# Patient Record
Sex: MALE | Race: WHITE | NOT HISPANIC OR LATINO | Employment: OTHER | URBAN - METROPOLITAN AREA
[De-identification: names, ages, dates, MRNs, and addresses within clinical notes are randomized per-mention and may not be internally consistent; named-entity substitution may affect disease eponyms.]

---

## 2017-02-20 ENCOUNTER — RECORDS - HEALTHEAST (OUTPATIENT)
Dept: LAB | Facility: CLINIC | Age: 67
End: 2017-02-20

## 2017-02-20 ENCOUNTER — RECORDS - HEALTHEAST (OUTPATIENT)
Dept: ADMINISTRATIVE | Facility: OTHER | Age: 67
End: 2017-02-20

## 2017-02-20 LAB
CHOLEST SERPL-MCNC: 154 MG/DL
FASTING STATUS PATIENT QL REPORTED: ABNORMAL
HDLC SERPL-MCNC: 43 MG/DL
LDLC SERPL CALC-MCNC: 73 MG/DL
TRIGL SERPL-MCNC: 190 MG/DL

## 2017-09-20 ENCOUNTER — SURGERY - HEALTHEAST (OUTPATIENT)
Dept: GASTROENTEROLOGY | Facility: HOSPITAL | Age: 67
End: 2017-09-20

## 2017-12-08 ENCOUNTER — RECORDS - HEALTHEAST (OUTPATIENT)
Dept: ADMINISTRATIVE | Facility: OTHER | Age: 67
End: 2017-12-08

## 2017-12-12 ENCOUNTER — RECORDS - HEALTHEAST (OUTPATIENT)
Dept: ADMINISTRATIVE | Facility: OTHER | Age: 67
End: 2017-12-12

## 2017-12-15 ENCOUNTER — RECORDS - HEALTHEAST (OUTPATIENT)
Dept: ADMINISTRATIVE | Facility: OTHER | Age: 67
End: 2017-12-15

## 2017-12-29 ENCOUNTER — AMBULATORY - HEALTHEAST (OUTPATIENT)
Dept: ENDOCRINOLOGY | Facility: CLINIC | Age: 67
End: 2017-12-29

## 2017-12-29 DIAGNOSIS — E11.9 DIABETES MELLITUS (H): ICD-10-CM

## 2018-01-17 ENCOUNTER — AMBULATORY - HEALTHEAST (OUTPATIENT)
Dept: ENDOCRINOLOGY | Facility: CLINIC | Age: 68
End: 2018-01-17

## 2018-01-17 ENCOUNTER — OFFICE VISIT - HEALTHEAST (OUTPATIENT)
Dept: ENDOCRINOLOGY | Facility: CLINIC | Age: 68
End: 2018-01-17

## 2018-01-17 DIAGNOSIS — E11.9 DIABETES MELLITUS (H): ICD-10-CM

## 2018-01-18 ENCOUNTER — COMMUNICATION - HEALTHEAST (OUTPATIENT)
Dept: ENDOCRINOLOGY | Facility: CLINIC | Age: 68
End: 2018-01-18

## 2018-01-19 ENCOUNTER — COMMUNICATION - HEALTHEAST (OUTPATIENT)
Dept: CARDIOLOGY | Facility: CLINIC | Age: 68
End: 2018-01-19

## 2018-01-19 ENCOUNTER — AMBULATORY - HEALTHEAST (OUTPATIENT)
Dept: EDUCATION SERVICES | Facility: CLINIC | Age: 68
End: 2018-01-19

## 2018-01-26 ENCOUNTER — AMBULATORY - HEALTHEAST (OUTPATIENT)
Dept: EDUCATION SERVICES | Facility: CLINIC | Age: 68
End: 2018-01-26

## 2018-01-29 ENCOUNTER — COMMUNICATION - HEALTHEAST (OUTPATIENT)
Dept: EDUCATION SERVICES | Facility: CLINIC | Age: 68
End: 2018-01-29

## 2018-02-01 ENCOUNTER — RECORDS - HEALTHEAST (OUTPATIENT)
Dept: ADMINISTRATIVE | Facility: OTHER | Age: 68
End: 2018-02-01

## 2018-02-02 ENCOUNTER — AMBULATORY - HEALTHEAST (OUTPATIENT)
Dept: EDUCATION SERVICES | Facility: CLINIC | Age: 68
End: 2018-02-02

## 2018-02-02 ENCOUNTER — COMMUNICATION - HEALTHEAST (OUTPATIENT)
Dept: EDUCATION SERVICES | Facility: CLINIC | Age: 68
End: 2018-02-02

## 2018-02-02 ENCOUNTER — RECORDS - HEALTHEAST (OUTPATIENT)
Dept: ADMINISTRATIVE | Facility: OTHER | Age: 68
End: 2018-02-02

## 2018-02-09 ENCOUNTER — COMMUNICATION - HEALTHEAST (OUTPATIENT)
Dept: EDUCATION SERVICES | Facility: CLINIC | Age: 68
End: 2018-02-09

## 2018-02-12 ENCOUNTER — AMBULATORY - HEALTHEAST (OUTPATIENT)
Dept: EDUCATION SERVICES | Facility: CLINIC | Age: 68
End: 2018-02-12

## 2018-02-19 ENCOUNTER — RECORDS - HEALTHEAST (OUTPATIENT)
Dept: ADMINISTRATIVE | Facility: OTHER | Age: 68
End: 2018-02-19

## 2018-02-26 ENCOUNTER — AMBULATORY - HEALTHEAST (OUTPATIENT)
Dept: EDUCATION SERVICES | Facility: CLINIC | Age: 68
End: 2018-02-26

## 2018-03-12 ENCOUNTER — AMBULATORY - HEALTHEAST (OUTPATIENT)
Dept: EDUCATION SERVICES | Facility: CLINIC | Age: 68
End: 2018-03-12

## 2018-04-02 ENCOUNTER — COMMUNICATION - HEALTHEAST (OUTPATIENT)
Dept: ADMINISTRATIVE | Facility: CLINIC | Age: 68
End: 2018-04-02

## 2018-04-02 DIAGNOSIS — Z79.4 TYPE 2 DIABETES MELLITUS WITHOUT COMPLICATION, WITH LONG-TERM CURRENT USE OF INSULIN (H): ICD-10-CM

## 2018-04-02 DIAGNOSIS — E11.9 TYPE 2 DIABETES MELLITUS WITHOUT COMPLICATION, WITH LONG-TERM CURRENT USE OF INSULIN (H): ICD-10-CM

## 2018-04-03 ENCOUNTER — RECORDS - HEALTHEAST (OUTPATIENT)
Dept: LAB | Facility: CLINIC | Age: 68
End: 2018-04-03

## 2018-04-03 LAB
ALBUMIN SERPL-MCNC: 3.5 G/DL (ref 3.5–5)
ALP SERPL-CCNC: 116 U/L (ref 45–120)
ALT SERPL W P-5'-P-CCNC: 95 U/L (ref 0–45)
ANION GAP SERPL CALCULATED.3IONS-SCNC: 12 MMOL/L (ref 5–18)
AST SERPL W P-5'-P-CCNC: 51 U/L (ref 0–40)
BILIRUB SERPL-MCNC: 1.2 MG/DL (ref 0–1)
BUN SERPL-MCNC: 18 MG/DL (ref 8–22)
CALCIUM SERPL-MCNC: 9.3 MG/DL (ref 8.5–10.5)
CHLORIDE BLD-SCNC: 101 MMOL/L (ref 98–107)
CHOLEST SERPL-MCNC: 149 MG/DL
CO2 SERPL-SCNC: 24 MMOL/L (ref 22–31)
CREAT SERPL-MCNC: 0.93 MG/DL (ref 0.7–1.3)
CREAT UR-MCNC: 89.3 MG/DL
FASTING STATUS PATIENT QL REPORTED: ABNORMAL
GFR SERPL CREATININE-BSD FRML MDRD: >60 ML/MIN/1.73M2
GLUCOSE BLD-MCNC: 214 MG/DL (ref 70–125)
HDLC SERPL-MCNC: 38 MG/DL
LDLC SERPL CALC-MCNC: 63 MG/DL
MICROALBUMIN UR-MCNC: 46.3 MG/DL (ref 0–1.99)
MICROALBUMIN/CREAT UR: 518.5 MG/G
POTASSIUM BLD-SCNC: 4.3 MMOL/L (ref 3.5–5)
PROT SERPL-MCNC: 6.6 G/DL (ref 6–8)
SODIUM SERPL-SCNC: 137 MMOL/L (ref 136–145)
TRIGL SERPL-MCNC: 242 MG/DL

## 2018-04-04 ENCOUNTER — AMBULATORY - HEALTHEAST (OUTPATIENT)
Dept: ENDOCRINOLOGY | Facility: CLINIC | Age: 68
End: 2018-04-04

## 2018-04-04 DIAGNOSIS — E11.9 DIABETES MELLITUS (H): ICD-10-CM

## 2018-05-04 ENCOUNTER — COMMUNICATION - HEALTHEAST (OUTPATIENT)
Dept: EDUCATION SERVICES | Facility: CLINIC | Age: 68
End: 2018-05-04

## 2018-05-16 ENCOUNTER — AMBULATORY - HEALTHEAST (OUTPATIENT)
Dept: LAB | Facility: CLINIC | Age: 68
End: 2018-05-16

## 2018-05-16 DIAGNOSIS — E11.9 DIABETES MELLITUS (H): ICD-10-CM

## 2018-05-16 LAB
CREAT UR-MCNC: 111.1 MG/DL
HBA1C MFR BLD: 7 % (ref 3.5–6)
MICROALBUMIN UR-MCNC: 46.72 MG/DL (ref 0–1.99)
MICROALBUMIN/CREAT UR: 420.5 MG/G

## 2018-05-22 ENCOUNTER — COMMUNICATION - HEALTHEAST (OUTPATIENT)
Dept: ENDOCRINOLOGY | Facility: CLINIC | Age: 68
End: 2018-05-22

## 2018-05-23 ENCOUNTER — AMBULATORY - HEALTHEAST (OUTPATIENT)
Dept: ENDOCRINOLOGY | Facility: CLINIC | Age: 68
End: 2018-05-23

## 2018-05-23 ENCOUNTER — OFFICE VISIT - HEALTHEAST (OUTPATIENT)
Dept: ENDOCRINOLOGY | Facility: CLINIC | Age: 68
End: 2018-05-23

## 2018-05-23 DIAGNOSIS — E11.9 DIABETES MELLITUS (H): ICD-10-CM

## 2018-05-24 ENCOUNTER — RECORDS - HEALTHEAST (OUTPATIENT)
Dept: ADMINISTRATIVE | Facility: OTHER | Age: 68
End: 2018-05-24

## 2018-06-07 ENCOUNTER — AMBULATORY - HEALTHEAST (OUTPATIENT)
Dept: EDUCATION SERVICES | Facility: CLINIC | Age: 68
End: 2018-06-07

## 2018-06-08 ENCOUNTER — AMBULATORY - HEALTHEAST (OUTPATIENT)
Dept: EDUCATION SERVICES | Facility: CLINIC | Age: 68
End: 2018-06-08

## 2018-07-11 ENCOUNTER — COMMUNICATION - HEALTHEAST (OUTPATIENT)
Dept: EDUCATION SERVICES | Facility: CLINIC | Age: 68
End: 2018-07-11

## 2018-09-24 ENCOUNTER — AMBULATORY - HEALTHEAST (OUTPATIENT)
Dept: LAB | Facility: CLINIC | Age: 68
End: 2018-09-24

## 2018-09-24 DIAGNOSIS — E11.9 DIABETES MELLITUS (H): ICD-10-CM

## 2018-09-24 LAB — HBA1C MFR BLD: 7 % (ref 3.5–6)

## 2018-09-27 ENCOUNTER — OFFICE VISIT - HEALTHEAST (OUTPATIENT)
Dept: ENDOCRINOLOGY | Facility: CLINIC | Age: 68
End: 2018-09-27

## 2018-09-27 ENCOUNTER — RECORDS - HEALTHEAST (OUTPATIENT)
Dept: ADMINISTRATIVE | Facility: OTHER | Age: 68
End: 2018-09-27

## 2018-09-27 DIAGNOSIS — E11.9 DIABETES MELLITUS (H): ICD-10-CM

## 2018-09-27 DIAGNOSIS — E66.9 OBESITY: ICD-10-CM

## 2018-09-27 ASSESSMENT — MIFFLIN-ST. JEOR: SCORE: 2306.69

## 2018-10-08 ENCOUNTER — COMMUNICATION - HEALTHEAST (OUTPATIENT)
Dept: EDUCATION SERVICES | Facility: CLINIC | Age: 68
End: 2018-10-08

## 2018-12-01 ENCOUNTER — COMMUNICATION - HEALTHEAST (OUTPATIENT)
Dept: EDUCATION SERVICES | Facility: CLINIC | Age: 68
End: 2018-12-01

## 2018-12-17 ENCOUNTER — COMMUNICATION - HEALTHEAST (OUTPATIENT)
Dept: ENDOCRINOLOGY | Facility: CLINIC | Age: 68
End: 2018-12-17

## 2018-12-17 DIAGNOSIS — E11.9 DIABETES MELLITUS (H): ICD-10-CM

## 2018-12-17 DIAGNOSIS — Z79.4 TYPE 2 DIABETES MELLITUS WITHOUT COMPLICATION, WITH LONG-TERM CURRENT USE OF INSULIN (H): ICD-10-CM

## 2018-12-17 DIAGNOSIS — E11.9 TYPE 2 DIABETES MELLITUS WITHOUT COMPLICATION, WITH LONG-TERM CURRENT USE OF INSULIN (H): ICD-10-CM

## 2018-12-17 RX ORDER — FLASH GLUCOSE SENSOR
1 KIT MISCELLANEOUS
Qty: 2 EACH | Refills: 3 | Status: SHIPPED | OUTPATIENT
Start: 2018-12-17 | End: 2021-09-24

## 2018-12-18 ENCOUNTER — COMMUNICATION - HEALTHEAST (OUTPATIENT)
Dept: ENDOCRINOLOGY | Facility: CLINIC | Age: 68
End: 2018-12-18

## 2018-12-22 ENCOUNTER — COMMUNICATION - HEALTHEAST (OUTPATIENT)
Dept: ENDOCRINOLOGY | Facility: CLINIC | Age: 68
End: 2018-12-22

## 2018-12-22 DIAGNOSIS — E11.9 TYPE 2 DIABETES MELLITUS WITHOUT COMPLICATION, WITH LONG-TERM CURRENT USE OF INSULIN (H): ICD-10-CM

## 2018-12-22 DIAGNOSIS — Z79.4 TYPE 2 DIABETES MELLITUS WITHOUT COMPLICATION, WITH LONG-TERM CURRENT USE OF INSULIN (H): ICD-10-CM

## 2019-01-25 ENCOUNTER — AMBULATORY - HEALTHEAST (OUTPATIENT)
Dept: ENDOCRINOLOGY | Facility: CLINIC | Age: 69
End: 2019-01-25

## 2019-01-25 DIAGNOSIS — E11.9 DIABETES MELLITUS (H): ICD-10-CM

## 2019-02-13 ENCOUNTER — RECORDS - HEALTHEAST (OUTPATIENT)
Dept: LAB | Facility: CLINIC | Age: 69
End: 2019-02-13

## 2019-02-13 LAB
ALBUMIN SERPL-MCNC: 3.6 G/DL (ref 3.5–5)
ALP SERPL-CCNC: 94 U/L (ref 45–120)
ALT SERPL W P-5'-P-CCNC: 82 U/L (ref 0–45)
ANION GAP SERPL CALCULATED.3IONS-SCNC: 7 MMOL/L (ref 5–18)
AST SERPL W P-5'-P-CCNC: 54 U/L (ref 0–40)
BILIRUB SERPL-MCNC: 1.4 MG/DL (ref 0–1)
BUN SERPL-MCNC: 16 MG/DL (ref 8–22)
CALCIUM SERPL-MCNC: 9.1 MG/DL (ref 8.5–10.5)
CHLORIDE BLD-SCNC: 106 MMOL/L (ref 98–107)
CHOLEST SERPL-MCNC: 185 MG/DL
CO2 SERPL-SCNC: 27 MMOL/L (ref 22–31)
CREAT SERPL-MCNC: 0.94 MG/DL (ref 0.7–1.3)
FASTING STATUS PATIENT QL REPORTED: ABNORMAL
GFR SERPL CREATININE-BSD FRML MDRD: >60 ML/MIN/1.73M2
GLUCOSE BLD-MCNC: 205 MG/DL (ref 70–125)
HDLC SERPL-MCNC: 39 MG/DL
LDLC SERPL CALC-MCNC: 72 MG/DL
POTASSIUM BLD-SCNC: 4.5 MMOL/L (ref 3.5–5)
PROT SERPL-MCNC: 6.5 G/DL (ref 6–8)
SODIUM SERPL-SCNC: 140 MMOL/L (ref 136–145)
TRIGL SERPL-MCNC: 370 MG/DL

## 2019-02-22 ENCOUNTER — COMMUNICATION - HEALTHEAST (OUTPATIENT)
Dept: ENDOCRINOLOGY | Facility: CLINIC | Age: 69
End: 2019-02-22

## 2019-03-28 ENCOUNTER — AMBULATORY - HEALTHEAST (OUTPATIENT)
Dept: LAB | Facility: CLINIC | Age: 69
End: 2019-03-28

## 2019-03-28 DIAGNOSIS — E11.9 DIABETES MELLITUS (H): ICD-10-CM

## 2019-03-28 LAB
ALBUMIN SERPL-MCNC: 3.5 G/DL (ref 3.5–5)
ALP SERPL-CCNC: 111 U/L (ref 45–120)
ALT SERPL W P-5'-P-CCNC: 81 U/L (ref 0–45)
ANION GAP SERPL CALCULATED.3IONS-SCNC: 10 MMOL/L (ref 5–18)
AST SERPL W P-5'-P-CCNC: 55 U/L (ref 0–40)
BILIRUB SERPL-MCNC: 0.7 MG/DL (ref 0–1)
BUN SERPL-MCNC: 15 MG/DL (ref 8–22)
CALCIUM SERPL-MCNC: 9.1 MG/DL (ref 8.5–10.5)
CHLORIDE BLD-SCNC: 105 MMOL/L (ref 98–107)
CO2 SERPL-SCNC: 25 MMOL/L (ref 22–31)
CREAT SERPL-MCNC: 1.03 MG/DL (ref 0.7–1.3)
GFR SERPL CREATININE-BSD FRML MDRD: >60 ML/MIN/1.73M2
GLUCOSE BLD-MCNC: 146 MG/DL (ref 70–125)
HBA1C MFR BLD: 6.8 % (ref 3.5–6)
POTASSIUM BLD-SCNC: 4.1 MMOL/L (ref 3.5–5)
PROT SERPL-MCNC: 6.2 G/DL (ref 6–8)
SODIUM SERPL-SCNC: 140 MMOL/L (ref 136–145)

## 2019-04-02 ENCOUNTER — RECORDS - HEALTHEAST (OUTPATIENT)
Dept: ADMINISTRATIVE | Facility: OTHER | Age: 69
End: 2019-04-02

## 2019-04-04 ENCOUNTER — OFFICE VISIT - HEALTHEAST (OUTPATIENT)
Dept: ENDOCRINOLOGY | Facility: CLINIC | Age: 69
End: 2019-04-04

## 2019-04-04 ENCOUNTER — AMBULATORY - HEALTHEAST (OUTPATIENT)
Dept: ENDOCRINOLOGY | Facility: CLINIC | Age: 69
End: 2019-04-04

## 2019-04-04 DIAGNOSIS — Z79.4 TYPE 2 DIABETES MELLITUS WITH OTHER CIRCULATORY COMPLICATION, WITH LONG-TERM CURRENT USE OF INSULIN (H): ICD-10-CM

## 2019-04-04 DIAGNOSIS — E11.59 TYPE 2 DIABETES MELLITUS WITH OTHER CIRCULATORY COMPLICATION, WITH LONG-TERM CURRENT USE OF INSULIN (H): ICD-10-CM

## 2019-04-04 DIAGNOSIS — E11.65 TYPE 2 DIABETES MELLITUS WITH HYPERGLYCEMIA, WITHOUT LONG-TERM CURRENT USE OF INSULIN (H): ICD-10-CM

## 2019-04-04 ASSESSMENT — MIFFLIN-ST. JEOR: SCORE: 2360.66

## 2019-04-15 ENCOUNTER — COMMUNICATION - HEALTHEAST (OUTPATIENT)
Dept: EDUCATION SERVICES | Facility: CLINIC | Age: 69
End: 2019-04-15

## 2019-04-16 ENCOUNTER — COMMUNICATION - HEALTHEAST (OUTPATIENT)
Dept: ENDOCRINOLOGY | Facility: CLINIC | Age: 69
End: 2019-04-16

## 2019-04-16 DIAGNOSIS — E11.9 DIABETES MELLITUS (H): ICD-10-CM

## 2019-07-05 ENCOUNTER — COMMUNICATION - HEALTHEAST (OUTPATIENT)
Dept: ENDOCRINOLOGY | Facility: CLINIC | Age: 69
End: 2019-07-05

## 2019-07-05 DIAGNOSIS — E11.9 DIABETES MELLITUS (H): ICD-10-CM

## 2019-08-12 ENCOUNTER — COMMUNICATION - HEALTHEAST (OUTPATIENT)
Dept: ENDOCRINOLOGY | Facility: CLINIC | Age: 69
End: 2019-08-12

## 2019-08-12 DIAGNOSIS — E11.9 TYPE 2 DIABETES MELLITUS WITHOUT COMPLICATION, WITH LONG-TERM CURRENT USE OF INSULIN (H): ICD-10-CM

## 2019-08-12 DIAGNOSIS — Z79.4 TYPE 2 DIABETES MELLITUS WITHOUT COMPLICATION, WITH LONG-TERM CURRENT USE OF INSULIN (H): ICD-10-CM

## 2019-08-12 RX ORDER — IPRATROPIUM BROMIDE AND ALBUTEROL 20; 100 UG/1; UG/1
SPRAY, METERED RESPIRATORY (INHALATION)
Refills: 3 | Status: SHIPPED | COMMUNITY
Start: 2019-04-17 | End: 2021-12-08

## 2019-08-23 ENCOUNTER — COMMUNICATION - HEALTHEAST (OUTPATIENT)
Dept: ENDOCRINOLOGY | Facility: CLINIC | Age: 69
End: 2019-08-23

## 2019-08-23 DIAGNOSIS — E11.9 DIABETES MELLITUS (H): ICD-10-CM

## 2019-09-24 ENCOUNTER — AMBULATORY - HEALTHEAST (OUTPATIENT)
Dept: LAB | Facility: CLINIC | Age: 69
End: 2019-09-24

## 2019-09-24 ENCOUNTER — COMMUNICATION - HEALTHEAST (OUTPATIENT)
Dept: ADMINISTRATIVE | Facility: CLINIC | Age: 69
End: 2019-09-24

## 2019-09-24 DIAGNOSIS — E11.65 TYPE 2 DIABETES MELLITUS WITH HYPERGLYCEMIA, WITHOUT LONG-TERM CURRENT USE OF INSULIN (H): ICD-10-CM

## 2019-09-24 LAB
CREAT UR-MCNC: 150.2 MG/DL
MICROALBUMIN UR-MCNC: 70.19 MG/DL (ref 0–1.99)
MICROALBUMIN/CREAT UR: 467.3 MG/G

## 2019-09-26 ENCOUNTER — AMBULATORY - HEALTHEAST (OUTPATIENT)
Dept: ENDOCRINOLOGY | Facility: CLINIC | Age: 69
End: 2019-09-26

## 2019-09-26 ENCOUNTER — OFFICE VISIT - HEALTHEAST (OUTPATIENT)
Dept: ENDOCRINOLOGY | Facility: CLINIC | Age: 69
End: 2019-09-26

## 2019-09-26 DIAGNOSIS — E11.59 TYPE 2 DIABETES MELLITUS WITH OTHER CIRCULATORY COMPLICATION, WITH LONG-TERM CURRENT USE OF INSULIN (H): ICD-10-CM

## 2019-09-26 DIAGNOSIS — Z96.41 INSULIN PUMP STATUS: ICD-10-CM

## 2019-09-26 DIAGNOSIS — Z79.4 TYPE 2 DIABETES MELLITUS WITH OTHER CIRCULATORY COMPLICATION, WITH LONG-TERM CURRENT USE OF INSULIN (H): ICD-10-CM

## 2019-09-26 ASSESSMENT — MIFFLIN-ST. JEOR: SCORE: 2364.29

## 2019-10-30 ENCOUNTER — COMMUNICATION - HEALTHEAST (OUTPATIENT)
Dept: ENDOCRINOLOGY | Facility: CLINIC | Age: 69
End: 2019-10-30

## 2019-10-30 DIAGNOSIS — Z79.4 TYPE 2 DIABETES MELLITUS WITHOUT COMPLICATION, WITH LONG-TERM CURRENT USE OF INSULIN (H): ICD-10-CM

## 2019-10-30 DIAGNOSIS — E11.9 TYPE 2 DIABETES MELLITUS WITHOUT COMPLICATION, WITH LONG-TERM CURRENT USE OF INSULIN (H): ICD-10-CM

## 2019-11-13 ENCOUNTER — COMMUNICATION - HEALTHEAST (OUTPATIENT)
Dept: ENDOCRINOLOGY | Facility: CLINIC | Age: 69
End: 2019-11-13

## 2019-11-13 DIAGNOSIS — E11.9 DIABETES MELLITUS (H): ICD-10-CM

## 2019-12-20 ENCOUNTER — RECORDS - HEALTHEAST (OUTPATIENT)
Dept: ADMINISTRATIVE | Facility: OTHER | Age: 69
End: 2019-12-20

## 2019-12-20 ENCOUNTER — RECORDS - HEALTHEAST (OUTPATIENT)
Dept: LAB | Facility: CLINIC | Age: 69
End: 2019-12-20

## 2019-12-20 LAB
ALBUMIN SERPL-MCNC: 3.7 G/DL (ref 3.5–5)
ALP SERPL-CCNC: 118 U/L (ref 45–120)
ALT SERPL W P-5'-P-CCNC: 80 U/L (ref 0–45)
ANION GAP SERPL CALCULATED.3IONS-SCNC: 11 MMOL/L (ref 5–18)
AST SERPL W P-5'-P-CCNC: 51 U/L (ref 0–40)
BILIRUB SERPL-MCNC: 0.9 MG/DL (ref 0–1)
BUN SERPL-MCNC: 17 MG/DL (ref 8–22)
CALCIUM SERPL-MCNC: 9.3 MG/DL (ref 8.5–10.5)
CHLORIDE BLD-SCNC: 103 MMOL/L (ref 98–107)
CHOLEST SERPL-MCNC: 175 MG/DL
CO2 SERPL-SCNC: 25 MMOL/L (ref 22–31)
CREAT SERPL-MCNC: 1.11 MG/DL (ref 0.7–1.3)
FASTING STATUS PATIENT QL REPORTED: ABNORMAL
GFR SERPL CREATININE-BSD FRML MDRD: >60 ML/MIN/1.73M2
GLUCOSE BLD-MCNC: 117 MG/DL (ref 70–125)
HDLC SERPL-MCNC: 40 MG/DL
LDLC SERPL CALC-MCNC: 74 MG/DL
POTASSIUM BLD-SCNC: 4.2 MMOL/L (ref 3.5–5)
PROT SERPL-MCNC: 6.6 G/DL (ref 6–8)
SODIUM SERPL-SCNC: 139 MMOL/L (ref 136–145)
TRIGL SERPL-MCNC: 305 MG/DL

## 2019-12-24 ENCOUNTER — HOSPITAL ENCOUNTER (OUTPATIENT)
Dept: NUCLEAR MEDICINE | Facility: HOSPITAL | Age: 69
Discharge: HOME | End: 2019-12-24
Attending: FAMILY MEDICINE

## 2019-12-24 ENCOUNTER — HOSPITAL ENCOUNTER (OUTPATIENT)
Dept: CARDIOLOGY | Facility: HOSPITAL | Age: 69
Discharge: HOME | End: 2019-12-24
Attending: FAMILY MEDICINE

## 2019-12-24 ENCOUNTER — RECORDS - HEALTHEAST (OUTPATIENT)
Dept: ADMINISTRATIVE | Facility: OTHER | Age: 69
End: 2019-12-24

## 2019-12-24 DIAGNOSIS — R06.09 DOE (DYSPNEA ON EXERTION): ICD-10-CM

## 2019-12-24 DIAGNOSIS — R06.09 OTHER FORMS OF DYSPNEA: ICD-10-CM

## 2019-12-24 LAB
CV STRESS CURRENT BP HE: NORMAL
CV STRESS CURRENT HR HE: 101
CV STRESS CURRENT HR HE: 102
CV STRESS CURRENT HR HE: 104
CV STRESS CURRENT HR HE: 105
CV STRESS CURRENT HR HE: 106
CV STRESS CURRENT HR HE: 106
CV STRESS CURRENT HR HE: 81
CV STRESS CURRENT HR HE: 83
CV STRESS CURRENT HR HE: 85
CV STRESS CURRENT HR HE: 91
CV STRESS CURRENT HR HE: 91
CV STRESS CURRENT HR HE: 92
CV STRESS CURRENT HR HE: 94
CV STRESS CURRENT HR HE: 94
CV STRESS CURRENT HR HE: 96
CV STRESS CURRENT HR HE: 97
CV STRESS CURRENT HR HE: 97
CV STRESS DEVIATION TIME HE: NORMAL
CV STRESS ECHO PERCENT HR HE: NORMAL
CV STRESS EXERCISE STAGE HE: NORMAL
CV STRESS FINAL RESTING BP HE: NORMAL
CV STRESS FINAL RESTING HR HE: 94
CV STRESS MAX HR HE: 108
CV STRESS MAX TREADMILL GRADE HE: 0
CV STRESS MAX TREADMILL SPEED HE: 0
CV STRESS PEAK DIA BP HE: NORMAL
CV STRESS PEAK SYS BP HE: NORMAL
CV STRESS PHASE HE: NORMAL
CV STRESS PROTOCOL HE: NORMAL
CV STRESS RESTING PT POSITION HE: NORMAL
CV STRESS ST DEVIATION AMOUNT HE: NORMAL
CV STRESS ST DEVIATION ELEVATION HE: NORMAL
CV STRESS ST EVELATION AMOUNT HE: NORMAL
CV STRESS TEST TYPE HE: NORMAL
CV STRESS TOTAL STAGE TIME MIN 1 HE: NORMAL
NUC STRESS EJECTION FRACTION: 61 %
RATE PRESSURE PRODUCT: NORMAL
STRESS ECHO BASELINE HR: 80
STRESS ECHO CALCULATED PERCENT HR: 71 %
STRESS ECHO LAST STRESS DIASTOLIC BP: 81
STRESS ECHO LAST STRESS HR: 101
STRESS ECHO LAST STRESS SYSTOLIC BP: 183
STRESS ECHO TARGET HR: 153

## 2020-01-03 ENCOUNTER — RECORDS - HEALTHEAST (OUTPATIENT)
Dept: ADMINISTRATIVE | Facility: OTHER | Age: 70
End: 2020-01-03

## 2020-01-03 ENCOUNTER — AMBULATORY - HEALTHEAST (OUTPATIENT)
Dept: CARDIOLOGY | Facility: CLINIC | Age: 70
End: 2020-01-03

## 2020-01-07 ENCOUNTER — OFFICE VISIT - HEALTHEAST (OUTPATIENT)
Dept: CARDIOLOGY | Facility: CLINIC | Age: 70
End: 2020-01-07

## 2020-01-07 DIAGNOSIS — R94.39 ABNORMAL STRESS TEST: ICD-10-CM

## 2020-01-07 DIAGNOSIS — I42.9 CARDIOMYOPATHY (H): ICD-10-CM

## 2020-01-07 DIAGNOSIS — E66.01 MORBID OBESITY (H): ICD-10-CM

## 2020-01-07 DIAGNOSIS — R06.09 DOE (DYSPNEA ON EXERTION): ICD-10-CM

## 2020-01-07 DIAGNOSIS — I10 ESSENTIAL HYPERTENSION: ICD-10-CM

## 2020-01-07 ASSESSMENT — MIFFLIN-ST. JEOR: SCORE: 2400.13

## 2020-01-08 LAB
ATRIAL RATE - MUSE: 77 BPM
DIASTOLIC BLOOD PRESSURE - MUSE: NORMAL
INTERPRETATION ECG - MUSE: NORMAL
P AXIS - MUSE: 10 DEGREES
PR INTERVAL - MUSE: 156 MS
QRS DURATION - MUSE: 92 MS
QT - MUSE: 396 MS
QTC - MUSE: 448 MS
R AXIS - MUSE: -39 DEGREES
SYSTOLIC BLOOD PRESSURE - MUSE: NORMAL
T AXIS - MUSE: 72 DEGREES
VENTRICULAR RATE- MUSE: 77 BPM

## 2020-01-14 ENCOUNTER — COMMUNICATION - HEALTHEAST (OUTPATIENT)
Dept: ENDOCRINOLOGY | Facility: CLINIC | Age: 70
End: 2020-01-14

## 2020-01-14 DIAGNOSIS — E11.9 DIABETES MELLITUS (H): ICD-10-CM

## 2020-02-06 ENCOUNTER — HOSPITAL ENCOUNTER (OUTPATIENT)
Dept: CT IMAGING | Facility: CLINIC | Age: 70
Discharge: HOME | End: 2020-02-06
Attending: INTERNAL MEDICINE

## 2020-02-06 ENCOUNTER — HOSPITAL ENCOUNTER (OUTPATIENT)
Dept: CARDIOLOGY | Facility: CLINIC | Age: 70
Discharge: HOME | End: 2020-02-06
Attending: INTERNAL MEDICINE

## 2020-02-06 DIAGNOSIS — R06.09 OTHER FORMS OF DYSPNEA: ICD-10-CM

## 2020-02-06 DIAGNOSIS — R06.09 DOE (DYSPNEA ON EXERTION): ICD-10-CM

## 2020-02-06 DIAGNOSIS — R94.39 ABNORMAL STRESS TEST: ICD-10-CM

## 2020-02-06 LAB
AORTIC ROOT: 3.7 CM
BSA FOR ECHO PROCEDURE: 2.83 M2
BSA FOR ECHO PROCEDURE: 2.83 M2
CREAT BLD-MCNC: 1.3 MG/DL (ref 0.7–1.3)
CV BLOOD PRESSURE: ABNORMAL MMHG
CV CALCIUM SCORE AGATSTON LM: 151
CV CALCIUM SCORING AGATSON LAD: 1406
CV CALCIUM SCORING AGATSTON CX: 598
CV CALCIUM SCORING AGATSTON RCA: 1629
CV CALCIUM SCORING AGATSTON TOTAL: 3784
CV ECHO HEIGHT: 70 IN
CV ECHO WEIGHT: 358 LBS
DOP CALC LVOT AREA: 4.15 CM2
DOP CALC LVOT DIAMETER: 2.3 CM
DOP CALC LVOT PEAK VEL: 115 CM/S
DOP CALC LVOT STROKE VOLUME: 95.5 CM3
DOP CALCLVOT PEAK VEL VTI: 23 CM
EJECTION FRACTION: 71 % (ref 55–75)
FRACTIONAL SHORTENING: 42.7 % (ref 28–44)
GFR SERPL CREATININE-BSD FRML MDRD: 55 ML/MIN/1.73M2
INTERVENTRICULAR SEPTUM IN END DIASTOLE: 1.38 CM (ref 0.6–1)
IVS/PW RATIO: 0.8
LA AREA 1: 25.1 CM2
LA AREA 2: 25.7 CM2
LEFT ATRIUM LENGTH: 6.16 CM
LEFT ATRIUM SIZE: 4 CM
LEFT ATRIUM TO AORTIC ROOT RATIO: 0.98 NO UNITS
LEFT ATRIUM VOLUME INDEX: 31.5 ML/M2
LEFT ATRIUM VOLUME: 89 ML
LEFT VENTRICLE CARDIAC INDEX: 2.3 L/MIN/M2
LEFT VENTRICLE CARDIAC OUTPUT: 6.5 L/MIN
LEFT VENTRICLE DIASTOLIC VOLUME INDEX: 50.9 CM3/M2 (ref 34–74)
LEFT VENTRICLE DIASTOLIC VOLUME: 144 CM3 (ref 62–150)
LEFT VENTRICLE HEART RATE: 68 BPM
LEFT VENTRICLE HEART RATE: 68 BPM
LEFT VENTRICLE MASS INDEX: 115.9 G/M2
LEFT VENTRICLE SYSTOLIC VOLUME INDEX: 14.8 CM3/M2 (ref 11–31)
LEFT VENTRICLE SYSTOLIC VOLUME: 42 CM3 (ref 21–61)
LEFT VENTRICULAR INTERNAL DIMENSION IN DIASTOLE: 5.04 CM (ref 4.2–5.8)
LEFT VENTRICULAR INTERNAL DIMENSION IN SYSTOLE: 2.89 CM (ref 2.5–4)
LEFT VENTRICULAR MASS: 328.1 G
LEFT VENTRICULAR OUTFLOW TRACT MEAN GRADIENT: 2 MMHG
LEFT VENTRICULAR OUTFLOW TRACT MEAN VELOCITY: 69.1 CM/S
LEFT VENTRICULAR OUTFLOW TRACT PEAK GRADIENT: 5 MMHG
LEFT VENTRICULAR POSTERIOR WALL IN END DIASTOLE: 1.63 CM (ref 0.6–1)
LV STROKE VOLUME INDEX: 33.7 ML/M2
MITRAL VALVE E/A RATIO: 0.7
MV AVERAGE E/E' RATIO: 9.7 CM/S
MV DECELERATION TIME: 335 MS
MV E'TISSUE VEL-LAT: 8.58 CM/S
MV E'TISSUE VEL-MED: 7.41 CM/S
MV LATERAL E/E' RATIO: 9.1
MV MEDIAL E/E' RATIO: 10.5
MV PEAK A VELOCITY: 107 CM/S
MV PEAK E VELOCITY: 77.7 CM/S
NUC REST DIASTOLIC VOLUME INDEX: 5728 LBS
NUC REST SYSTOLIC VOLUME INDEX: 70 IN
PR MAX PG: 3 MMHG
PR PEAK VELOCITY: 86.9 CM/S
TRICUSPID VALVE ANULAR PLANE SYSTOLIC EXCURSION: 2.2 CM

## 2020-02-06 ASSESSMENT — MIFFLIN-ST. JEOR
SCORE: 2400.13
SCORE: 2400.13

## 2020-02-07 ENCOUNTER — COMMUNICATION - HEALTHEAST (OUTPATIENT)
Dept: CARDIOLOGY | Facility: CLINIC | Age: 70
End: 2020-02-07

## 2020-02-07 DIAGNOSIS — R93.1 ABNORMAL CARDIAC CT ANGIOGRAPHY: ICD-10-CM

## 2020-02-07 DIAGNOSIS — R06.09 DOE (DYSPNEA ON EXERTION): ICD-10-CM

## 2020-02-11 ENCOUNTER — SURGERY - HEALTHEAST (OUTPATIENT)
Dept: CARDIOLOGY | Facility: CLINIC | Age: 70
End: 2020-02-11

## 2020-02-13 ENCOUNTER — COMMUNICATION - HEALTHEAST (OUTPATIENT)
Dept: CARDIOLOGY | Facility: CLINIC | Age: 70
End: 2020-02-13

## 2020-02-19 ENCOUNTER — SURGERY - HEALTHEAST (OUTPATIENT)
Dept: CARDIOLOGY | Facility: CLINIC | Age: 70
End: 2020-02-19

## 2020-02-19 ASSESSMENT — MIFFLIN-ST. JEOR: SCORE: 2378.01

## 2020-02-21 ENCOUNTER — OFFICE VISIT - HEALTHEAST (OUTPATIENT)
Dept: CARDIOLOGY | Facility: CLINIC | Age: 70
End: 2020-02-21

## 2020-02-21 ENCOUNTER — SURGERY - HEALTHEAST (OUTPATIENT)
Dept: CARDIOLOGY | Facility: CLINIC | Age: 70
End: 2020-02-21

## 2020-02-21 DIAGNOSIS — I25.118 CORONARY ARTERY DISEASE OF NATIVE HEART WITH STABLE ANGINA PECTORIS, UNSPECIFIED VESSEL OR LESION TYPE (H): ICD-10-CM

## 2020-02-21 DIAGNOSIS — I25.10 CAD (CORONARY ARTERY DISEASE): ICD-10-CM

## 2020-02-21 ASSESSMENT — MIFFLIN-ST. JEOR: SCORE: 2419.63

## 2020-02-25 ENCOUNTER — ANESTHESIA - HEALTHEAST (OUTPATIENT)
Dept: SURGERY | Facility: CLINIC | Age: 70
End: 2020-02-25

## 2020-02-25 ENCOUNTER — HOSPITAL ENCOUNTER (OUTPATIENT)
Dept: SURGERY | Facility: CLINIC | Age: 70
Discharge: HOME | End: 2020-02-25

## 2020-02-25 DIAGNOSIS — I42.9 CARDIOMYOPATHY, UNSPECIFIED TYPE (H): ICD-10-CM

## 2020-02-25 DIAGNOSIS — I25.10 CAD (CORONARY ARTERY DISEASE): ICD-10-CM

## 2020-02-25 LAB
ABO/RH(D): NORMAL
ALBUMIN UR-MCNC: ABNORMAL MG/DL
ANION GAP SERPL CALCULATED.3IONS-SCNC: 8 MMOL/L (ref 5–18)
ANTIBODY SCREEN: NEGATIVE
APPEARANCE UR: CLEAR
BACTERIA #/AREA URNS HPF: ABNORMAL HPF
BILIRUB UR QL STRIP: NEGATIVE
BUN SERPL-MCNC: 16 MG/DL (ref 8–22)
CALCIUM SERPL-MCNC: 8.7 MG/DL (ref 8.5–10.5)
CHLORIDE BLD-SCNC: 105 MMOL/L (ref 98–107)
CO2 SERPL-SCNC: 27 MMOL/L (ref 22–31)
COLOR UR AUTO: YELLOW
CREAT SERPL-MCNC: 1.05 MG/DL (ref 0.7–1.3)
ERYTHROCYTE [DISTWIDTH] IN BLOOD BY AUTOMATED COUNT: 14.1 % (ref 11–14.5)
GFR SERPL CREATININE-BSD FRML MDRD: >60 ML/MIN/1.73M2
GLUCOSE BLD-MCNC: 154 MG/DL (ref 70–125)
GLUCOSE UR STRIP-MCNC: NEGATIVE MG/DL
HBA1C MFR BLD: 6.7 % (ref 4.2–6.1)
HCT VFR BLD AUTO: 42.3 % (ref 40–54)
HGB BLD-MCNC: 14 G/DL (ref 14–18)
HGB UR QL STRIP: NEGATIVE
INR PPP: 1.13 (ref 0.9–1.1)
KETONES UR STRIP-MCNC: NEGATIVE MG/DL
LEUKOCYTE ESTERASE UR QL STRIP: NEGATIVE
MAGNESIUM SERPL-MCNC: 1.5 MG/DL (ref 1.8–2.6)
MCH RBC QN AUTO: 30.7 PG (ref 27–34)
MCHC RBC AUTO-ENTMCNC: 33.1 G/DL (ref 32–36)
MCV RBC AUTO: 93 FL (ref 80–100)
NITRATE UR QL: NEGATIVE
PH UR STRIP: 6 [PH] (ref 4.5–8)
PLATELET # BLD AUTO: 159 THOU/UL (ref 140–440)
PMV BLD AUTO: 11.3 FL (ref 8.5–12.5)
POTASSIUM BLD-SCNC: 3.9 MMOL/L (ref 3.5–5)
PREALB SERPL-MCNC: 21.4 MG/DL (ref 19–38)
RBC # BLD AUTO: 4.56 MILL/UL (ref 4.4–6.2)
RBC #/AREA URNS AUTO: ABNORMAL HPF
SODIUM SERPL-SCNC: 140 MMOL/L (ref 136–145)
SP GR UR STRIP: 1.02 (ref 1–1.03)
SQUAMOUS #/AREA URNS AUTO: ABNORMAL LPF
UROBILINOGEN UR STRIP-ACNC: ABNORMAL
WBC #/AREA URNS AUTO: ABNORMAL HPF
WBC: 6.7 THOU/UL (ref 4–11)

## 2020-02-25 ASSESSMENT — MIFFLIN-ST. JEOR: SCORE: 2396.04

## 2020-02-26 ENCOUNTER — SURGERY - HEALTHEAST (OUTPATIENT)
Dept: SURGERY | Facility: CLINIC | Age: 70
End: 2020-02-26

## 2020-02-26 LAB — BACTERIA SPEC CULT: NORMAL

## 2020-02-26 ASSESSMENT — MIFFLIN-ST. JEOR
SCORE: 2364.29
SCORE: 2396.25

## 2020-02-27 ASSESSMENT — MIFFLIN-ST. JEOR: SCORE: 2402.4

## 2020-02-28 ASSESSMENT — MIFFLIN-ST. JEOR: SCORE: 2382.89

## 2020-02-29 ASSESSMENT — MIFFLIN-ST. JEOR: SCORE: 2353.41

## 2020-03-01 ASSESSMENT — MIFFLIN-ST. JEOR: SCORE: 2301.7

## 2020-03-02 ASSESSMENT — MIFFLIN-ST. JEOR: SCORE: 2289

## 2020-03-03 ASSESSMENT — MIFFLIN-ST. JEOR: SCORE: 2274.03

## 2020-03-05 ENCOUNTER — COMMUNICATION - HEALTHEAST (OUTPATIENT)
Dept: CARDIOLOGY | Facility: CLINIC | Age: 70
End: 2020-03-05

## 2020-03-06 ENCOUNTER — AMBULATORY - HEALTHEAST (OUTPATIENT)
Dept: CARDIAC REHAB | Facility: CLINIC | Age: 70
End: 2020-03-06

## 2020-03-06 DIAGNOSIS — Z95.1 S/P CABG (CORONARY ARTERY BYPASS GRAFT): ICD-10-CM

## 2020-03-09 ENCOUNTER — COMMUNICATION - HEALTHEAST (OUTPATIENT)
Dept: CARDIOLOGY | Facility: CLINIC | Age: 70
End: 2020-03-09

## 2020-03-09 ENCOUNTER — AMBULATORY - HEALTHEAST (OUTPATIENT)
Dept: CARDIAC REHAB | Facility: CLINIC | Age: 70
End: 2020-03-09

## 2020-03-09 DIAGNOSIS — Z95.1 S/P CABG (CORONARY ARTERY BYPASS GRAFT): ICD-10-CM

## 2020-03-09 LAB — GLUCOSE BLDC GLUCOMTR-MCNC: 183 MG/DL (ref 70–139)

## 2020-03-11 ENCOUNTER — AMBULATORY - HEALTHEAST (OUTPATIENT)
Dept: CARDIAC REHAB | Facility: CLINIC | Age: 70
End: 2020-03-11

## 2020-03-11 DIAGNOSIS — Z95.1 S/P CABG (CORONARY ARTERY BYPASS GRAFT): ICD-10-CM

## 2020-03-13 ENCOUNTER — AMBULATORY - HEALTHEAST (OUTPATIENT)
Dept: CARDIAC REHAB | Facility: CLINIC | Age: 70
End: 2020-03-13

## 2020-03-13 DIAGNOSIS — Z95.1 S/P CABG (CORONARY ARTERY BYPASS GRAFT): ICD-10-CM

## 2020-03-16 ENCOUNTER — AMBULATORY - HEALTHEAST (OUTPATIENT)
Dept: CARDIAC REHAB | Facility: CLINIC | Age: 70
End: 2020-03-16

## 2020-03-16 DIAGNOSIS — Z95.1 S/P CABG (CORONARY ARTERY BYPASS GRAFT): ICD-10-CM

## 2020-03-18 ENCOUNTER — AMBULATORY - HEALTHEAST (OUTPATIENT)
Dept: CARDIAC REHAB | Facility: CLINIC | Age: 70
End: 2020-03-18

## 2020-03-18 DIAGNOSIS — Z95.1 S/P CABG (CORONARY ARTERY BYPASS GRAFT): ICD-10-CM

## 2020-03-20 ENCOUNTER — AMBULATORY - HEALTHEAST (OUTPATIENT)
Dept: CARDIAC REHAB | Facility: CLINIC | Age: 70
End: 2020-03-20

## 2020-03-20 DIAGNOSIS — Z95.1 S/P CABG (CORONARY ARTERY BYPASS GRAFT): ICD-10-CM

## 2020-03-23 ENCOUNTER — AMBULATORY - HEALTHEAST (OUTPATIENT)
Dept: CARDIAC REHAB | Facility: CLINIC | Age: 70
End: 2020-03-23

## 2020-03-23 ENCOUNTER — COMMUNICATION - HEALTHEAST (OUTPATIENT)
Dept: CARDIOLOGY | Facility: CLINIC | Age: 70
End: 2020-03-23

## 2020-03-23 DIAGNOSIS — Z95.1 S/P CABG (CORONARY ARTERY BYPASS GRAFT): ICD-10-CM

## 2020-03-24 ENCOUNTER — OFFICE VISIT - HEALTHEAST (OUTPATIENT)
Dept: CARDIOLOGY | Facility: CLINIC | Age: 70
End: 2020-03-24

## 2020-03-24 ENCOUNTER — AMBULATORY - HEALTHEAST (OUTPATIENT)
Dept: CARDIOLOGY | Facility: CLINIC | Age: 70
End: 2020-03-24

## 2020-03-24 DIAGNOSIS — Z95.1 S/P CABG (CORONARY ARTERY BYPASS GRAFT): ICD-10-CM

## 2020-03-25 ENCOUNTER — HOSPITAL ENCOUNTER (OUTPATIENT)
Dept: RADIOLOGY | Facility: CLINIC | Age: 70
Discharge: HOME | End: 2020-03-25

## 2020-03-25 ENCOUNTER — AMBULATORY - HEALTHEAST (OUTPATIENT)
Dept: CARDIAC REHAB | Facility: CLINIC | Age: 70
End: 2020-03-25

## 2020-03-25 DIAGNOSIS — Z95.1 S/P CABG (CORONARY ARTERY BYPASS GRAFT): ICD-10-CM

## 2020-03-27 ENCOUNTER — AMBULATORY - HEALTHEAST (OUTPATIENT)
Dept: CARDIAC REHAB | Facility: CLINIC | Age: 70
End: 2020-03-27

## 2020-03-27 ENCOUNTER — AMBULATORY - HEALTHEAST (OUTPATIENT)
Dept: LAB | Facility: CLINIC | Age: 70
End: 2020-03-27

## 2020-03-27 DIAGNOSIS — E11.59 TYPE 2 DIABETES MELLITUS WITH OTHER CIRCULATORY COMPLICATION, WITH LONG-TERM CURRENT USE OF INSULIN (H): ICD-10-CM

## 2020-03-27 DIAGNOSIS — Z79.4 TYPE 2 DIABETES MELLITUS WITH OTHER CIRCULATORY COMPLICATION, WITH LONG-TERM CURRENT USE OF INSULIN (H): ICD-10-CM

## 2020-03-27 DIAGNOSIS — Z95.1 S/P CABG (CORONARY ARTERY BYPASS GRAFT): ICD-10-CM

## 2020-03-27 LAB — HBA1C MFR BLD: 6.1 % (ref 3.5–6)

## 2020-04-01 ENCOUNTER — AMBULATORY - HEALTHEAST (OUTPATIENT)
Dept: CARDIAC REHAB | Facility: CLINIC | Age: 70
End: 2020-04-01

## 2020-04-01 DIAGNOSIS — Z95.1 S/P CABG (CORONARY ARTERY BYPASS GRAFT): ICD-10-CM

## 2020-04-03 ENCOUNTER — OFFICE VISIT - HEALTHEAST (OUTPATIENT)
Dept: ENDOCRINOLOGY | Facility: CLINIC | Age: 70
End: 2020-04-03

## 2020-04-03 ENCOUNTER — AMBULATORY - HEALTHEAST (OUTPATIENT)
Dept: CARDIAC REHAB | Facility: CLINIC | Age: 70
End: 2020-04-03

## 2020-04-03 DIAGNOSIS — Z79.4 TYPE 2 DIABETES MELLITUS WITH OTHER CIRCULATORY COMPLICATION, WITH LONG-TERM CURRENT USE OF INSULIN (H): ICD-10-CM

## 2020-04-03 DIAGNOSIS — E11.59 TYPE 2 DIABETES MELLITUS WITH OTHER CIRCULATORY COMPLICATION, WITH LONG-TERM CURRENT USE OF INSULIN (H): ICD-10-CM

## 2020-04-03 DIAGNOSIS — Z95.1 S/P CABG (CORONARY ARTERY BYPASS GRAFT): ICD-10-CM

## 2020-04-08 ENCOUNTER — AMBULATORY - HEALTHEAST (OUTPATIENT)
Dept: CARDIAC REHAB | Facility: CLINIC | Age: 70
End: 2020-04-08

## 2020-04-08 DIAGNOSIS — Z95.1 S/P CABG (CORONARY ARTERY BYPASS GRAFT): ICD-10-CM

## 2020-04-10 ENCOUNTER — AMBULATORY - HEALTHEAST (OUTPATIENT)
Dept: CARDIAC REHAB | Facility: CLINIC | Age: 70
End: 2020-04-10

## 2020-04-10 DIAGNOSIS — Z95.1 S/P CABG (CORONARY ARTERY BYPASS GRAFT): ICD-10-CM

## 2020-04-15 ENCOUNTER — AMBULATORY - HEALTHEAST (OUTPATIENT)
Dept: CARDIAC REHAB | Facility: CLINIC | Age: 70
End: 2020-04-15

## 2020-04-15 DIAGNOSIS — Z95.1 S/P CABG (CORONARY ARTERY BYPASS GRAFT): ICD-10-CM

## 2020-04-17 ENCOUNTER — RECORDS - HEALTHEAST (OUTPATIENT)
Dept: ADMINISTRATIVE | Facility: OTHER | Age: 70
End: 2020-04-17

## 2020-04-17 ENCOUNTER — AMBULATORY - HEALTHEAST (OUTPATIENT)
Dept: CARDIAC REHAB | Facility: CLINIC | Age: 70
End: 2020-04-17

## 2020-04-17 DIAGNOSIS — Z95.1 S/P CABG (CORONARY ARTERY BYPASS GRAFT): ICD-10-CM

## 2020-04-18 ENCOUNTER — COMMUNICATION - HEALTHEAST (OUTPATIENT)
Dept: CARDIOLOGY | Facility: CLINIC | Age: 70
End: 2020-04-18

## 2020-04-18 DIAGNOSIS — Z95.1 S/P CABG (CORONARY ARTERY BYPASS GRAFT): ICD-10-CM

## 2020-04-20 ENCOUNTER — OFFICE VISIT - HEALTHEAST (OUTPATIENT)
Dept: CARDIOLOGY | Facility: CLINIC | Age: 70
End: 2020-04-20

## 2020-04-20 DIAGNOSIS — Z95.1 S/P CABG (CORONARY ARTERY BYPASS GRAFT): ICD-10-CM

## 2020-04-20 DIAGNOSIS — I10 ESSENTIAL HYPERTENSION: ICD-10-CM

## 2020-04-20 DIAGNOSIS — I25.10 CORONARY ARTERY DISEASE INVOLVING NATIVE CORONARY ARTERY OF NATIVE HEART WITHOUT ANGINA PECTORIS: ICD-10-CM

## 2020-04-20 DIAGNOSIS — E66.01 MORBID OBESITY (H): ICD-10-CM

## 2020-04-22 ENCOUNTER — AMBULATORY - HEALTHEAST (OUTPATIENT)
Dept: CARDIAC REHAB | Facility: CLINIC | Age: 70
End: 2020-04-22

## 2020-04-22 DIAGNOSIS — Z95.1 S/P CABG (CORONARY ARTERY BYPASS GRAFT): ICD-10-CM

## 2020-04-24 ENCOUNTER — AMBULATORY - HEALTHEAST (OUTPATIENT)
Dept: CARDIAC REHAB | Facility: CLINIC | Age: 70
End: 2020-04-24

## 2020-04-24 DIAGNOSIS — Z95.1 S/P CABG (CORONARY ARTERY BYPASS GRAFT): ICD-10-CM

## 2020-04-27 ENCOUNTER — COMMUNICATION - HEALTHEAST (OUTPATIENT)
Dept: ENDOCRINOLOGY | Facility: CLINIC | Age: 70
End: 2020-04-27

## 2020-04-27 DIAGNOSIS — E11.59 TYPE 2 DIABETES MELLITUS WITH OTHER CIRCULATORY COMPLICATION, WITH LONG-TERM CURRENT USE OF INSULIN (H): ICD-10-CM

## 2020-04-27 DIAGNOSIS — E11.9 DIABETES MELLITUS (H): ICD-10-CM

## 2020-04-27 DIAGNOSIS — Z79.4 TYPE 2 DIABETES MELLITUS WITH OTHER CIRCULATORY COMPLICATION, WITH LONG-TERM CURRENT USE OF INSULIN (H): ICD-10-CM

## 2020-04-29 ENCOUNTER — AMBULATORY - HEALTHEAST (OUTPATIENT)
Dept: CARDIAC REHAB | Facility: CLINIC | Age: 70
End: 2020-04-29

## 2020-04-29 DIAGNOSIS — Z95.1 S/P CABG (CORONARY ARTERY BYPASS GRAFT): ICD-10-CM

## 2020-05-01 ENCOUNTER — AMBULATORY - HEALTHEAST (OUTPATIENT)
Dept: CARDIAC REHAB | Facility: CLINIC | Age: 70
End: 2020-05-01

## 2020-05-01 DIAGNOSIS — Z95.1 S/P CABG (CORONARY ARTERY BYPASS GRAFT): ICD-10-CM

## 2020-05-04 ENCOUNTER — AMBULATORY - HEALTHEAST (OUTPATIENT)
Dept: CARDIAC REHAB | Facility: CLINIC | Age: 70
End: 2020-05-04

## 2020-05-04 DIAGNOSIS — Z95.1 S/P CABG (CORONARY ARTERY BYPASS GRAFT): ICD-10-CM

## 2020-05-06 ENCOUNTER — AMBULATORY - HEALTHEAST (OUTPATIENT)
Dept: CARDIAC REHAB | Facility: CLINIC | Age: 70
End: 2020-05-06

## 2020-05-06 DIAGNOSIS — Z95.1 S/P CABG (CORONARY ARTERY BYPASS GRAFT): ICD-10-CM

## 2020-05-11 ENCOUNTER — AMBULATORY - HEALTHEAST (OUTPATIENT)
Dept: CARDIAC REHAB | Facility: CLINIC | Age: 70
End: 2020-05-11

## 2020-05-11 DIAGNOSIS — Z95.1 S/P CABG (CORONARY ARTERY BYPASS GRAFT): ICD-10-CM

## 2020-05-13 ENCOUNTER — AMBULATORY - HEALTHEAST (OUTPATIENT)
Dept: CARDIAC REHAB | Facility: CLINIC | Age: 70
End: 2020-05-13

## 2020-05-13 DIAGNOSIS — Z95.1 S/P CABG (CORONARY ARTERY BYPASS GRAFT): ICD-10-CM

## 2020-05-13 LAB — GLUCOSE BLDC GLUCOMTR-MCNC: 119 MG/DL (ref 70–139)

## 2020-05-18 ENCOUNTER — AMBULATORY - HEALTHEAST (OUTPATIENT)
Dept: CARDIAC REHAB | Facility: CLINIC | Age: 70
End: 2020-05-18

## 2020-05-18 DIAGNOSIS — Z95.1 S/P CABG (CORONARY ARTERY BYPASS GRAFT): ICD-10-CM

## 2020-05-20 ENCOUNTER — AMBULATORY - HEALTHEAST (OUTPATIENT)
Dept: CARDIAC REHAB | Facility: CLINIC | Age: 70
End: 2020-05-20

## 2020-05-20 DIAGNOSIS — Z95.1 S/P CABG (CORONARY ARTERY BYPASS GRAFT): ICD-10-CM

## 2020-05-27 ENCOUNTER — AMBULATORY - HEALTHEAST (OUTPATIENT)
Dept: CARDIAC REHAB | Facility: CLINIC | Age: 70
End: 2020-05-27

## 2020-05-27 DIAGNOSIS — Z95.1 S/P CABG (CORONARY ARTERY BYPASS GRAFT): ICD-10-CM

## 2020-06-01 ENCOUNTER — AMBULATORY - HEALTHEAST (OUTPATIENT)
Dept: CARDIAC REHAB | Facility: CLINIC | Age: 70
End: 2020-06-01

## 2020-06-01 DIAGNOSIS — Z95.1 S/P CABG (CORONARY ARTERY BYPASS GRAFT): ICD-10-CM

## 2020-06-03 ENCOUNTER — AMBULATORY - HEALTHEAST (OUTPATIENT)
Dept: CARDIAC REHAB | Facility: CLINIC | Age: 70
End: 2020-06-03

## 2020-06-03 DIAGNOSIS — Z95.1 S/P CABG (CORONARY ARTERY BYPASS GRAFT): ICD-10-CM

## 2020-06-08 ENCOUNTER — AMBULATORY - HEALTHEAST (OUTPATIENT)
Dept: CARDIAC REHAB | Facility: CLINIC | Age: 70
End: 2020-06-08

## 2020-06-08 DIAGNOSIS — Z95.1 S/P CABG (CORONARY ARTERY BYPASS GRAFT): ICD-10-CM

## 2020-06-10 ENCOUNTER — AMBULATORY - HEALTHEAST (OUTPATIENT)
Dept: CARDIAC REHAB | Facility: CLINIC | Age: 70
End: 2020-06-10

## 2020-06-10 DIAGNOSIS — Z95.1 S/P CABG (CORONARY ARTERY BYPASS GRAFT): ICD-10-CM

## 2020-06-15 ENCOUNTER — AMBULATORY - HEALTHEAST (OUTPATIENT)
Dept: CARDIAC REHAB | Facility: CLINIC | Age: 70
End: 2020-06-15

## 2020-06-15 DIAGNOSIS — Z95.1 S/P CABG (CORONARY ARTERY BYPASS GRAFT): ICD-10-CM

## 2020-06-17 ENCOUNTER — AMBULATORY - HEALTHEAST (OUTPATIENT)
Dept: CARDIAC REHAB | Facility: CLINIC | Age: 70
End: 2020-06-17

## 2020-06-17 DIAGNOSIS — Z95.1 S/P CABG (CORONARY ARTERY BYPASS GRAFT): ICD-10-CM

## 2020-06-22 ENCOUNTER — AMBULATORY - HEALTHEAST (OUTPATIENT)
Dept: CARDIAC REHAB | Facility: CLINIC | Age: 70
End: 2020-06-22

## 2020-06-22 DIAGNOSIS — Z95.1 S/P CABG (CORONARY ARTERY BYPASS GRAFT): ICD-10-CM

## 2020-07-06 ENCOUNTER — COMMUNICATION - HEALTHEAST (OUTPATIENT)
Dept: ENDOCRINOLOGY | Facility: CLINIC | Age: 70
End: 2020-07-06

## 2020-07-06 DIAGNOSIS — E11.59 TYPE 2 DIABETES MELLITUS WITH OTHER CIRCULATORY COMPLICATION, WITH LONG-TERM CURRENT USE OF INSULIN (H): ICD-10-CM

## 2020-07-06 DIAGNOSIS — Z79.4 TYPE 2 DIABETES MELLITUS WITH OTHER CIRCULATORY COMPLICATION, WITH LONG-TERM CURRENT USE OF INSULIN (H): ICD-10-CM

## 2020-08-14 ENCOUNTER — AMBULATORY - HEALTHEAST (OUTPATIENT)
Dept: ENDOCRINOLOGY | Facility: CLINIC | Age: 70
End: 2020-08-14

## 2020-08-14 DIAGNOSIS — Z79.4 TYPE 2 DIABETES MELLITUS WITH OTHER CIRCULATORY COMPLICATION, WITH LONG-TERM CURRENT USE OF INSULIN (H): ICD-10-CM

## 2020-08-14 DIAGNOSIS — E11.59 TYPE 2 DIABETES MELLITUS WITH OTHER CIRCULATORY COMPLICATION, WITH LONG-TERM CURRENT USE OF INSULIN (H): ICD-10-CM

## 2020-09-16 ENCOUNTER — COMMUNICATION - HEALTHEAST (OUTPATIENT)
Dept: CARDIOLOGY | Facility: CLINIC | Age: 70
End: 2020-09-16

## 2020-09-17 ENCOUNTER — OFFICE VISIT - HEALTHEAST (OUTPATIENT)
Dept: CARDIOLOGY | Facility: CLINIC | Age: 70
End: 2020-09-17

## 2020-09-17 DIAGNOSIS — E11.59 TYPE 2 DIABETES MELLITUS WITH OTHER CIRCULATORY COMPLICATION, WITH LONG-TERM CURRENT USE OF INSULIN (H): ICD-10-CM

## 2020-09-17 DIAGNOSIS — Z91.89 SEDENTARY LIFESTYLE: ICD-10-CM

## 2020-09-17 DIAGNOSIS — E78.2 MIXED HYPERLIPIDEMIA: ICD-10-CM

## 2020-09-17 DIAGNOSIS — Z95.1 S/P CABG (CORONARY ARTERY BYPASS GRAFT): ICD-10-CM

## 2020-09-17 DIAGNOSIS — I10 ESSENTIAL HYPERTENSION: ICD-10-CM

## 2020-09-17 DIAGNOSIS — E66.01 MORBID OBESITY (H): ICD-10-CM

## 2020-09-17 DIAGNOSIS — I25.10 CORONARY ARTERY DISEASE INVOLVING NATIVE CORONARY ARTERY OF NATIVE HEART WITHOUT ANGINA PECTORIS: ICD-10-CM

## 2020-09-17 DIAGNOSIS — Z79.4 TYPE 2 DIABETES MELLITUS WITH OTHER CIRCULATORY COMPLICATION, WITH LONG-TERM CURRENT USE OF INSULIN (H): ICD-10-CM

## 2020-09-17 RX ORDER — FUROSEMIDE 40 MG
1 TABLET ORAL DAILY
Status: SHIPPED | COMMUNITY
Start: 2020-08-31 | End: 2022-05-31

## 2020-09-17 RX ORDER — LISINOPRIL 20 MG/1
20 TABLET ORAL 2 TIMES DAILY
Qty: 180 TABLET | Refills: 3 | Status: SHIPPED | OUTPATIENT
Start: 2020-09-17 | End: 2021-09-07

## 2020-09-17 ASSESSMENT — MIFFLIN-ST. JEOR: SCORE: 2341.16

## 2020-09-28 ENCOUNTER — COMMUNICATION - HEALTHEAST (OUTPATIENT)
Dept: ENDOCRINOLOGY | Facility: CLINIC | Age: 70
End: 2020-09-28

## 2020-09-28 DIAGNOSIS — Z79.4 TYPE 2 DIABETES MELLITUS WITH OTHER CIRCULATORY COMPLICATION, WITH LONG-TERM CURRENT USE OF INSULIN (H): ICD-10-CM

## 2020-09-28 DIAGNOSIS — E11.59 TYPE 2 DIABETES MELLITUS WITH OTHER CIRCULATORY COMPLICATION, WITH LONG-TERM CURRENT USE OF INSULIN (H): ICD-10-CM

## 2020-10-02 ENCOUNTER — AMBULATORY - HEALTHEAST (OUTPATIENT)
Dept: LAB | Facility: CLINIC | Age: 70
End: 2020-10-02

## 2020-10-02 DIAGNOSIS — Z79.4 TYPE 2 DIABETES MELLITUS WITH OTHER CIRCULATORY COMPLICATION, WITH LONG-TERM CURRENT USE OF INSULIN (H): ICD-10-CM

## 2020-10-02 DIAGNOSIS — E11.59 TYPE 2 DIABETES MELLITUS WITH OTHER CIRCULATORY COMPLICATION, WITH LONG-TERM CURRENT USE OF INSULIN (H): ICD-10-CM

## 2020-10-02 LAB
ANION GAP SERPL CALCULATED.3IONS-SCNC: 12 MMOL/L (ref 5–18)
BUN SERPL-MCNC: 22 MG/DL (ref 8–22)
CALCIUM SERPL-MCNC: 9.4 MG/DL (ref 8.5–10.5)
CHLORIDE BLD-SCNC: 102 MMOL/L (ref 98–107)
CO2 SERPL-SCNC: 29 MMOL/L (ref 22–31)
CREAT SERPL-MCNC: 1.09 MG/DL (ref 0.7–1.3)
CREAT UR-MCNC: 44.5 MG/DL
GFR SERPL CREATININE-BSD FRML MDRD: >60 ML/MIN/1.73M2
GLUCOSE BLD-MCNC: 178 MG/DL (ref 70–125)
HBA1C MFR BLD: 8.9 %
MICROALBUMIN UR-MCNC: 17.87 MG/DL (ref 0–1.99)
MICROALBUMIN/CREAT UR: 401.6 MG/G
POTASSIUM BLD-SCNC: 4.9 MMOL/L (ref 3.5–5)
SODIUM SERPL-SCNC: 143 MMOL/L (ref 136–145)

## 2020-10-09 ENCOUNTER — OFFICE VISIT - HEALTHEAST (OUTPATIENT)
Dept: ENDOCRINOLOGY | Facility: CLINIC | Age: 70
End: 2020-10-09

## 2020-10-09 DIAGNOSIS — Z79.4 TYPE 2 DIABETES MELLITUS WITH OTHER CIRCULATORY COMPLICATION, WITH LONG-TERM CURRENT USE OF INSULIN (H): ICD-10-CM

## 2020-10-09 DIAGNOSIS — E11.59 TYPE 2 DIABETES MELLITUS WITH OTHER CIRCULATORY COMPLICATION, WITH LONG-TERM CURRENT USE OF INSULIN (H): ICD-10-CM

## 2020-10-22 ENCOUNTER — COMMUNICATION - HEALTHEAST (OUTPATIENT)
Dept: ENDOCRINOLOGY | Facility: CLINIC | Age: 70
End: 2020-10-22

## 2020-10-22 DIAGNOSIS — E11.9 DIABETES MELLITUS (H): ICD-10-CM

## 2020-11-12 ENCOUNTER — COMMUNICATION - HEALTHEAST (OUTPATIENT)
Dept: ENDOCRINOLOGY | Facility: CLINIC | Age: 70
End: 2020-11-12

## 2020-11-12 DIAGNOSIS — E11.59 TYPE 2 DIABETES MELLITUS WITH OTHER CIRCULATORY COMPLICATION, WITH LONG-TERM CURRENT USE OF INSULIN (H): ICD-10-CM

## 2020-11-12 DIAGNOSIS — Z79.4 TYPE 2 DIABETES MELLITUS WITH OTHER CIRCULATORY COMPLICATION, WITH LONG-TERM CURRENT USE OF INSULIN (H): ICD-10-CM

## 2020-12-30 ENCOUNTER — COMMUNICATION - HEALTHEAST (OUTPATIENT)
Dept: CARDIOLOGY | Facility: CLINIC | Age: 70
End: 2020-12-30

## 2020-12-30 DIAGNOSIS — Z95.1 S/P CABG (CORONARY ARTERY BYPASS GRAFT): ICD-10-CM

## 2020-12-31 RX ORDER — METOPROLOL TARTRATE 100 MG
TABLET ORAL
Qty: 180 TABLET | Refills: 2 | Status: SHIPPED | OUTPATIENT
Start: 2020-12-31 | End: 2021-09-29

## 2021-01-28 ENCOUNTER — COMMUNICATION - HEALTHEAST (OUTPATIENT)
Dept: CARDIOLOGY | Facility: CLINIC | Age: 71
End: 2021-01-28

## 2021-01-28 DIAGNOSIS — Z95.1 S/P CABG (CORONARY ARTERY BYPASS GRAFT): ICD-10-CM

## 2021-01-28 RX ORDER — ATORVASTATIN CALCIUM 80 MG/1
TABLET, FILM COATED ORAL
Qty: 90 TABLET | Refills: 1 | Status: SHIPPED | OUTPATIENT
Start: 2021-01-28 | End: 2021-09-29

## 2021-02-19 ENCOUNTER — RECORDS - HEALTHEAST (OUTPATIENT)
Dept: LAB | Facility: CLINIC | Age: 71
End: 2021-02-19

## 2021-02-19 LAB
ALBUMIN SERPL-MCNC: 3.6 G/DL (ref 3.5–5)
ALP SERPL-CCNC: 134 U/L (ref 45–120)
ALT SERPL W P-5'-P-CCNC: 68 U/L (ref 0–45)
ANION GAP SERPL CALCULATED.3IONS-SCNC: 10 MMOL/L (ref 5–18)
AST SERPL W P-5'-P-CCNC: 43 U/L (ref 0–40)
BILIRUB SERPL-MCNC: 1.1 MG/DL (ref 0–1)
BUN SERPL-MCNC: 24 MG/DL (ref 8–22)
CALCIUM SERPL-MCNC: 8.5 MG/DL (ref 8.5–10.5)
CHLORIDE BLD-SCNC: 104 MMOL/L (ref 98–107)
CHOLEST SERPL-MCNC: 123 MG/DL
CO2 SERPL-SCNC: 23 MMOL/L (ref 22–31)
CREAT SERPL-MCNC: 1.11 MG/DL (ref 0.7–1.3)
FASTING STATUS PATIENT QL REPORTED: ABNORMAL
GFR SERPL CREATININE-BSD FRML MDRD: >60 ML/MIN/1.73M2
GLUCOSE BLD-MCNC: 330 MG/DL (ref 70–125)
HDLC SERPL-MCNC: 33 MG/DL
LDLC SERPL CALC-MCNC: 23 MG/DL
POTASSIUM BLD-SCNC: 4.1 MMOL/L (ref 3.5–5)
PROT SERPL-MCNC: 6.3 G/DL (ref 6–8)
SODIUM SERPL-SCNC: 137 MMOL/L (ref 136–145)
TRIGL SERPL-MCNC: 334 MG/DL

## 2021-02-23 ENCOUNTER — OFFICE VISIT - HEALTHEAST (OUTPATIENT)
Dept: ENDOCRINOLOGY | Facility: CLINIC | Age: 71
End: 2021-02-23

## 2021-02-23 DIAGNOSIS — E11.59 TYPE 2 DIABETES MELLITUS WITH OTHER CIRCULATORY COMPLICATION, WITH LONG-TERM CURRENT USE OF INSULIN (H): ICD-10-CM

## 2021-02-23 DIAGNOSIS — Z79.4 TYPE 2 DIABETES MELLITUS WITH OTHER CIRCULATORY COMPLICATION, WITH LONG-TERM CURRENT USE OF INSULIN (H): ICD-10-CM

## 2021-02-26 ENCOUNTER — COMMUNICATION - HEALTHEAST (OUTPATIENT)
Dept: ENDOCRINOLOGY | Facility: CLINIC | Age: 71
End: 2021-02-26

## 2021-02-26 DIAGNOSIS — E11.9 DIABETES MELLITUS (H): ICD-10-CM

## 2021-02-26 RX ORDER — FLASH GLUCOSE SENSOR
1 KIT MISCELLANEOUS
Qty: 6 KIT | Refills: 1 | Status: SHIPPED | OUTPATIENT
Start: 2021-02-26 | End: 2021-08-16

## 2021-03-25 ENCOUNTER — COMMUNICATION - HEALTHEAST (OUTPATIENT)
Dept: ADMINISTRATIVE | Facility: CLINIC | Age: 71
End: 2021-03-25

## 2021-03-25 DIAGNOSIS — E11.59 TYPE 2 DIABETES MELLITUS WITH OTHER CIRCULATORY COMPLICATION, WITH LONG-TERM CURRENT USE OF INSULIN (H): ICD-10-CM

## 2021-03-25 DIAGNOSIS — Z79.4 TYPE 2 DIABETES MELLITUS WITH OTHER CIRCULATORY COMPLICATION, WITH LONG-TERM CURRENT USE OF INSULIN (H): ICD-10-CM

## 2021-03-25 RX ORDER — DULAGLUTIDE 1.5 MG/.5ML
1 INJECTION, SOLUTION SUBCUTANEOUS WEEKLY
Qty: 12 SYRINGE | Refills: 0 | Status: SHIPPED | OUTPATIENT
Start: 2021-03-25 | End: 2021-09-27

## 2021-04-02 ENCOUNTER — COMMUNICATION - HEALTHEAST (OUTPATIENT)
Dept: INTERNAL MEDICINE | Facility: CLINIC | Age: 71
End: 2021-04-02

## 2021-04-02 DIAGNOSIS — E11.59 TYPE 2 DIABETES MELLITUS WITH OTHER CIRCULATORY COMPLICATION, WITH LONG-TERM CURRENT USE OF INSULIN (H): ICD-10-CM

## 2021-04-02 DIAGNOSIS — Z79.4 TYPE 2 DIABETES MELLITUS WITH OTHER CIRCULATORY COMPLICATION, WITH LONG-TERM CURRENT USE OF INSULIN (H): ICD-10-CM

## 2021-05-25 ENCOUNTER — RECORDS - HEALTHEAST (OUTPATIENT)
Dept: ADMINISTRATIVE | Facility: CLINIC | Age: 71
End: 2021-05-25

## 2021-05-26 ENCOUNTER — RECORDS - HEALTHEAST (OUTPATIENT)
Dept: ADMINISTRATIVE | Facility: CLINIC | Age: 71
End: 2021-05-26

## 2021-05-27 ENCOUNTER — RECORDS - HEALTHEAST (OUTPATIENT)
Dept: ADMINISTRATIVE | Facility: CLINIC | Age: 71
End: 2021-05-27

## 2021-05-27 ENCOUNTER — COMMUNICATION - HEALTHEAST (OUTPATIENT)
Dept: ENDOCRINOLOGY | Facility: CLINIC | Age: 71
End: 2021-05-27

## 2021-05-27 DIAGNOSIS — Z79.4 TYPE 2 DIABETES MELLITUS WITH OTHER CIRCULATORY COMPLICATION, WITH LONG-TERM CURRENT USE OF INSULIN (H): ICD-10-CM

## 2021-05-27 DIAGNOSIS — E11.59 TYPE 2 DIABETES MELLITUS WITH OTHER CIRCULATORY COMPLICATION, WITH LONG-TERM CURRENT USE OF INSULIN (H): ICD-10-CM

## 2021-05-27 NOTE — PROGRESS NOTES
"[facility]  ENDOCRINOLOGY    Diabetes Note 4/6/2019    Walt Lambert, 1950, [id number]          Reason for visit      1. Type 2 diabetes mellitus with other circulatory complication, with long-term current use of insulin (H)        HPI     Walt Lambert is a very pleasant 68 y.o. old male who presents for follow up.  SUMMARY:  Alvino returns today in f/u for DM 2.  His current A1c is wonderful at 6.8.  He mentions that he had to take a course of Prednisone about a month and a half ago because of an illness that aggravated his asthma and cardiomyopathy.  He is a little surprised that the A1c was that low.  He is headed to [place] soon for another Work related visit.  He continues to use a T-Slim insulin pump for his insulin administration. He has brought some information for me and it appears that he is having elevations early in the morning.  It could possibly be some Magda phenomena.  Over the last month, he was above range about 78% of the time with an ave BG of 184.  He is using a significant amount of insulin daily with 375 units daily.  He had no hypoglycemia.       Blood glucose data:  Ave: 184    Past Medical History     Patient Active Problem List   Diagnosis     Diabetes mellitus (H)     Obesity     Cardiomyopathy (H)        Family History       family history is not on file.    Social History      reports that  has never smoked. he has never used smokeless tobacco. He reports that he drinks alcohol. He reports that he does not use drugs.      Review of Systems     Patient has no polyuria or polydipsia, no chest pain, dyspnea or TIA's, no numbness, tingling or pain in extremities  Remainder negative except as noted in HPI.    Vital Signs     /72 (Patient Site: Right Arm, Patient Position: Sitting, Cuff Size: Adult Large)   Pulse 88   Ht 5' 10\" (1.778 m)   Wt (!) 349 lb 4.8 oz (158.4 kg)   BMI 50.12 kg/m    Wt Readings from Last 3 Encounters:   04/04/19 (!) 349 lb 4.8 oz (158.4 kg)   09/27/18 " (!) 337 lb 6.4 oz (153 kg)   06/08/18 (!) 331 lb (150.1 kg)       Physical Exam     Constitutional:  Well developed, Well nourished  HENT:  Normocephalic,   Neck: Thyroid normal, No lymph nodes, Supple  Eyes:  PERRL, Conjunctiva pink  Respiratory:  Normal breath sounds, No respiratory distress  Cardiovascular:  Normal heart rate, Normal rhythm, No murmurs  GI:  Bowel sounds normal, Soft, No tenderness  Musculoskeletal:  No gross deformity or lesions, normal dorsalis pedis pulses  Skin: No acanthosis nigricans, lipoatrophy or lipodystrophy  Neurologic:  Alert & oriented x 3, nonfocal  Psychiatric:  Affect, Mood, Insight appropriate      Assessment     1. Type 2 diabetes mellitus with other circulatory complication, with long-term current use of insulin (H)        Plan     Alvino's current control has improved, and he will continue with his current settings.  I have advised him to eat a small high protein snack before bed to help with the higher FBS readings.  He will try this.  I will see him back this fall.  Time spent with pt today: 25 min with >50% spent in counseling and coordination of care.        Emily SOLIS Endocrinology  4/6/2019  2:19 PM        Lab Results     Hemoglobin A1c   Date Value Ref Range Status   03/28/2019 6.8 (H) 3.5 - 6.0 % Final   09/24/2018 7.0 (H) 3.5 - 6.0 % Final   05/16/2018 7.0 (H) 3.5 - 6.0 % Final   06/01/2012 7.8 (H) 4.2 - 6.1 % Final     Creatinine   Date Value Ref Range Status   03/28/2019 1.03 0.70 - 1.30 mg/dL Final   02/13/2019 0.94 0.70 - 1.30 mg/dL Final   04/03/2018 0.93 0.70 - 1.30 mg/dL Final     Microalbumin, Random Urine   Date Value Ref Range Status   05/16/2018 46.72 (H) 0.00 - 1.99 mg/dL Final       Cholesterol   Date Value Ref Range Status   02/13/2019 185 <=199 mg/dL Final     HDL Cholesterol   Date Value Ref Range Status   02/13/2019 39 (L) >=40 mg/dL Final     LDL Calculated   Date Value Ref Range Status   02/13/2019 72 <=129 mg/dL Final     Triglycerides    Date Value Ref Range Status   02/13/2019 370 (H) <=149 mg/dL Final       Lab Results   Component Value Date    ALT 81 (H) 03/28/2019    AST 55 (H) 03/28/2019    ALKPHOS 111 03/28/2019    BILITOT 0.7 03/28/2019         Current Medications     Outpatient Medications Prior to Visit   Medication Sig Dispense Refill     aspirin 81 MG EC tablet Take 81 mg by mouth daily.       budesonide-formoterol (SYMBICORT) 160-4.5 mcg/actuation inhaler Inhale 2 puffs 2 (two) times a day.       dulaglutide (TRULICITY) 1.5 mg/0.5 mL PnIj Inject 1.5 mg under the skin once a week. 6 mL 2     flash glucose scanning reader (FREESTYLE ANA M 14 DAY READER) Misc Use 1 each As Directed every 14 (fourteen) days. 2 each 3     flash glucose sensor (FREESTYLE ANA M 14 DAY SENSOR) Kit Use 1 each As Directed every 14 (fourteen) days. 2 kit 3     lisinopril (PRINIVIL,ZESTRIL) 10 MG tablet Take 10 mg by mouth daily.       multivitamin (ONE A DAY) per tablet Take 1 tablet by mouth daily.       NOVOLOG U-100 INSULIN ASPART 100 unit/mL injection INJECT 220-230 UNITS UNDER THE SKIN DAILY. FOR INSULIN PUMP. (Patient taking differently: INJECT UP  UNITS PER DAY IN INSULIN PUMP) 210 mL 1     omeprazole (PRILOSEC) 20 MG capsule Take 20 mg by mouth daily.       simvastatin (ZOCOR) 20 MG tablet Take 20 mg by mouth bedtime.       triamcinolone (KENALOG) 0.025 % cream Apply topically 3 (three) times a day.       COMBIVENT RESPIMAT  mcg/actuation Mist inhaler INHALE 1 PUFF 4 TIMES A DAY AS NEEDED  3     No facility-administered medications prior to visit.

## 2021-05-29 ENCOUNTER — RECORDS - HEALTHEAST (OUTPATIENT)
Dept: ADMINISTRATIVE | Facility: CLINIC | Age: 71
End: 2021-05-29

## 2021-05-30 ENCOUNTER — RECORDS - HEALTHEAST (OUTPATIENT)
Dept: ADMINISTRATIVE | Facility: CLINIC | Age: 71
End: 2021-05-30

## 2021-05-31 VITALS — WEIGHT: 315 LBS

## 2021-06-01 VITALS — WEIGHT: 315 LBS

## 2021-06-01 NOTE — PROGRESS NOTES
"[facility]  ENDOCRINOLOGY    Diabetes Note 9/29/2019    Walt Lambert, 1950, [id number]          Reason for visit      1. Type 2 diabetes mellitus with other circulatory complication, with long-term current use of insulin (H)    2. Insulin pump status        HPI     Walt Lambert is a very pleasant 68 y.o. old male who presents for follow up.  SUMMARY:  Alvino returns today in f/u for DM 2.  His current A1c is 6.6 and improved from his last at 7.  He reports that he recently had some significant pain in his R calf and was worked up for DVT.  This was negative.  He is currently using a Freestyle Ivelisse CGM and reports: 4% of the time over 240, 56% of the time between 141 and 240, 32% of the time between 100 and 140, and 8% of the time between 70 -99.  He had an ave BG of 161 over the last month. He continues to use the T-Slim insulin pump for his insulin management. We do not have software to interrogate this pump. Noted he has had to increase his insulin dosages recently.         Past Medical History     Patient Active Problem List   Diagnosis     Diabetes mellitus (H)     Obesity     Cardiomyopathy (H)     Chronic obstructive pulmonary disease (H)     Hyperlipidemia     Hypertension     Sleep apnea     Insulin pump status        Family History       family history is not on file.    Social History      reports that he has never smoked. He has never used smokeless tobacco. He reports current alcohol use. He reports that he does not use drugs.      Review of Systems     Patient has no polyuria or polydipsia, no chest pain, dyspnea or TIA's, no numbness, tingling or pain in extremities  Remainder negative except as noted in HPI.    Vital Signs     /82 (Patient Site: Right Arm, Patient Position: Sitting, Cuff Size: Adult Large)   Pulse 78   Ht 5' 10\" (1.778 m)   Wt (!) 350 lb 1.6 oz (158.8 kg)   BMI 50.23 kg/m    Wt Readings from Last 3 Encounters:   09/26/19 (!) 350 lb 1.6 oz (158.8 kg)   04/04/19 " (!) 349 lb 4.8 oz (158.4 kg)   09/27/18 (!) 337 lb 6.4 oz (153 kg)       Physical Exam     Constitutional:  Well developed, Well nourished  HENT:  Normocephalic,   Neck: Thyroid normal, No lymph nodes, Supple  Eyes:  PERRL, Conjunctiva pink  Respiratory:  Normal breath sounds, No respiratory distress  Cardiovascular:  Normal heart rate, Normal rhythm, No murmurs  GI:  Bowel sounds normal, Soft, No tenderness  Musculoskeletal:  No gross deformity or lesions, normal dorsalis pedis pulses  Skin: No acanthosis nigricans, lipoatrophy or lipodystrophy  Neurologic:  Alert & oriented x 3, nonfocal  Psychiatric:  Affect, Mood, Insight appropriate  Diabetic foot exam: no ulcers, charcot's or high risk calluses, Normal monofilament exam        Assessment     1. Type 2 diabetes mellitus with other circulatory complication, with long-term current use of insulin (H)    2. Insulin pump status        Plan     Alvino's management has improved. No changes to his settings warranted today. He will continue with his CGM. Discussion determined that he is not drinking much water (mostly coffee). Suggested that he drink two cups of water for every cup of coffee. He will work on this. F/u with me in 6 months. Time spent with pt today: 25 min with >50% spent in counseling and coordination of care.        Emily SOLIS Endocrinology  9/29/2019  8:22 AM        Lab Results     Hemoglobin A1c   Date Value Ref Range Status   03/28/2019 6.8 (H) 3.5 - 6.0 % Final   09/24/2018 7.0 (H) 3.5 - 6.0 % Final   05/16/2018 7.0 (H) 3.5 - 6.0 % Final   06/01/2012 7.8 (H) 4.2 - 6.1 % Final     Creatinine   Date Value Ref Range Status   03/28/2019 1.03 0.70 - 1.30 mg/dL Final   02/13/2019 0.94 0.70 - 1.30 mg/dL Final   04/03/2018 0.93 0.70 - 1.30 mg/dL Final     Microalbumin, Random Urine   Date Value Ref Range Status   09/24/2019 70.19 (H) 0.00 - 1.99 mg/dL Final       Cholesterol   Date Value Ref Range Status   02/13/2019 185 <=199 mg/dL Final     HDL  Cholesterol   Date Value Ref Range Status   02/13/2019 39 (L) >=40 mg/dL Final     LDL Calculated   Date Value Ref Range Status   02/13/2019 72 <=129 mg/dL Final     Triglycerides   Date Value Ref Range Status   02/13/2019 370 (H) <=149 mg/dL Final       Lab Results   Component Value Date    ALT 81 (H) 03/28/2019    AST 55 (H) 03/28/2019    ALKPHOS 111 03/28/2019    BILITOT 0.7 03/28/2019         Current Medications     Outpatient Medications Prior to Visit   Medication Sig Dispense Refill     aspirin 81 MG EC tablet Take 81 mg by mouth daily.       budesonide-formoterol (SYMBICORT) 160-4.5 mcg/actuation inhaler Inhale 2 puffs 2 (two) times a day.       COMBIVENT RESPIMAT  mcg/actuation Mist inhaler INHALE 1 PUFF BY MOUTH 4 TIMES A DAY AS NEEDED  3     flash glucose scanning reader ([employer] TechnologiesSTYLE ANA M 14 DAY READER) Misc Use 1 each As Directed every 14 (fourteen) days. 2 each 3     FREESTYLE ANA M 14 DAY SENSOR Kit USE 1 EVERY 14 DAYS AS DIRECTED 6 kit 0     insulin aspart U-100 (NOVOLOG U-100 INSULIN ASPART) 100 unit/mL injection Inject up to 375 units subcut via the pump daily. (Patient taking differently: Inject up to 300 units subcut via the pump daily.      ) 338 mL PRN     lisinopril (PRINIVIL,ZESTRIL) 10 MG tablet Take 10 mg by mouth daily.       multivitamin (ONE A DAY) per tablet Take 1 tablet by mouth daily.       omeprazole (PRILOSEC) 20 MG capsule Take 20 mg by mouth daily.       simvastatin (ZOCOR) 20 MG tablet Take 20 mg by mouth bedtime.       triamcinolone (KENALOG) 0.025 % cream Apply topically 3 (three) times a day.       TRULICITY 1.5 mg/0.5 mL PnIj INJECT 1.5 MG UNDER THE SKIN ONCE A WEEK 6 Syringe 0     No facility-administered medications prior to visit.

## 2021-06-01 NOTE — TELEPHONE ENCOUNTER
fyi- pending appointment with Christina scheduled for 09.26.2019. Patient just had A1C labs done with PCP within past 90 days. He will call to have results fax.   He is going to stop in for urine labs today.

## 2021-06-02 VITALS — BODY MASS INDEX: 45.1 KG/M2 | HEIGHT: 70 IN | WEIGHT: 315 LBS

## 2021-06-02 VITALS — WEIGHT: 315 LBS | BODY MASS INDEX: 45.1 KG/M2 | HEIGHT: 70 IN

## 2021-06-03 VITALS
SYSTOLIC BLOOD PRESSURE: 160 MMHG | BODY MASS INDEX: 45.1 KG/M2 | WEIGHT: 315 LBS | HEIGHT: 70 IN | HEART RATE: 78 BPM | DIASTOLIC BLOOD PRESSURE: 82 MMHG

## 2021-06-03 NOTE — TELEPHONE ENCOUNTER
Britton dennison/ [facility]    Calling to check on the status.     Please call Britton @ [phone]

## 2021-06-04 VITALS — BODY MASS INDEX: 47.55 KG/M2 | WEIGHT: 315 LBS

## 2021-06-04 VITALS
WEIGHT: 315 LBS | DIASTOLIC BLOOD PRESSURE: 79 MMHG | BODY MASS INDEX: 46.4 KG/M2 | SYSTOLIC BLOOD PRESSURE: 129 MMHG | HEART RATE: 89 BPM

## 2021-06-04 VITALS
DIASTOLIC BLOOD PRESSURE: 70 MMHG | WEIGHT: 315 LBS | HEIGHT: 70 IN | RESPIRATION RATE: 16 BRPM | SYSTOLIC BLOOD PRESSURE: 120 MMHG | HEART RATE: 60 BPM | BODY MASS INDEX: 45.1 KG/M2

## 2021-06-04 VITALS — WEIGHT: 315 LBS | BODY MASS INDEX: 48.6 KG/M2

## 2021-06-04 VITALS — BODY MASS INDEX: 48.37 KG/M2 | WEIGHT: 315 LBS

## 2021-06-04 VITALS — WEIGHT: 315 LBS | HEIGHT: 70 IN | BODY MASS INDEX: 45.1 KG/M2

## 2021-06-04 VITALS — HEIGHT: 70 IN | WEIGHT: 315 LBS | BODY MASS INDEX: 45.1 KG/M2

## 2021-06-04 VITALS — BODY MASS INDEX: 47.28 KG/M2 | WEIGHT: 315 LBS

## 2021-06-04 VITALS — WEIGHT: 315 LBS | BODY MASS INDEX: 47.45 KG/M2

## 2021-06-04 VITALS — WEIGHT: 315 LBS | BODY MASS INDEX: 49.04 KG/M2

## 2021-06-04 VITALS — BODY MASS INDEX: 45.1 KG/M2 | WEIGHT: 315 LBS | HEIGHT: 70 IN

## 2021-06-04 VITALS — WEIGHT: 315 LBS | BODY MASS INDEX: 47.26 KG/M2

## 2021-06-04 VITALS — WEIGHT: 315 LBS | BODY MASS INDEX: 46.75 KG/M2

## 2021-06-04 VITALS — WEIGHT: 315 LBS | BODY MASS INDEX: 48.74 KG/M2

## 2021-06-04 VITALS
HEART RATE: 81 BPM | WEIGHT: 315 LBS | BODY MASS INDEX: 47.38 KG/M2 | DIASTOLIC BLOOD PRESSURE: 90 MMHG | OXYGEN SATURATION: 97 % | SYSTOLIC BLOOD PRESSURE: 149 MMHG

## 2021-06-04 VITALS — BODY MASS INDEX: 48.57 KG/M2 | WEIGHT: 315 LBS

## 2021-06-04 VITALS — WEIGHT: 315 LBS | BODY MASS INDEX: 45.1 KG/M2 | HEIGHT: 70 IN

## 2021-06-04 VITALS — WEIGHT: 315 LBS | BODY MASS INDEX: 47.74 KG/M2

## 2021-06-04 VITALS — BODY MASS INDEX: 49.01 KG/M2 | WEIGHT: 315 LBS

## 2021-06-04 VITALS — BODY MASS INDEX: 47.12 KG/M2 | WEIGHT: 315 LBS

## 2021-06-04 VITALS — BODY MASS INDEX: 48.2 KG/M2 | WEIGHT: 315 LBS

## 2021-06-04 VITALS — WEIGHT: 315 LBS | BODY MASS INDEX: 47.58 KG/M2

## 2021-06-04 VITALS — WEIGHT: 315 LBS | BODY MASS INDEX: 48.12 KG/M2

## 2021-06-04 VITALS — WEIGHT: 315 LBS | BODY MASS INDEX: 46.92 KG/M2

## 2021-06-04 VITALS — BODY MASS INDEX: 48.76 KG/M2 | WEIGHT: 315 LBS

## 2021-06-04 VITALS — WEIGHT: 315 LBS | BODY MASS INDEX: 49.1 KG/M2

## 2021-06-04 VITALS — BODY MASS INDEX: 48.53 KG/M2 | WEIGHT: 315 LBS

## 2021-06-04 VITALS — WEIGHT: 315 LBS | BODY MASS INDEX: 48.47 KG/M2

## 2021-06-04 VITALS — WEIGHT: 315 LBS | BODY MASS INDEX: 47.54 KG/M2

## 2021-06-04 VITALS — BODY MASS INDEX: 46.65 KG/M2 | WEIGHT: 315 LBS

## 2021-06-04 VITALS — WEIGHT: 315 LBS | BODY MASS INDEX: 48.76 KG/M2

## 2021-06-04 VITALS — BODY MASS INDEX: 47.21 KG/M2 | WEIGHT: 315 LBS

## 2021-06-04 VITALS — WEIGHT: 315 LBS | BODY MASS INDEX: 47.95 KG/M2

## 2021-06-04 VITALS — WEIGHT: 315 LBS | BODY MASS INDEX: 48.78 KG/M2

## 2021-06-04 VITALS
SYSTOLIC BLOOD PRESSURE: 158 MMHG | WEIGHT: 315 LBS | HEIGHT: 70 IN | DIASTOLIC BLOOD PRESSURE: 90 MMHG | BODY MASS INDEX: 45.1 KG/M2 | RESPIRATION RATE: 16 BRPM | HEART RATE: 84 BPM

## 2021-06-04 VITALS — BODY MASS INDEX: 48.77 KG/M2 | WEIGHT: 315 LBS

## 2021-06-04 VITALS — BODY MASS INDEX: 47.64 KG/M2 | WEIGHT: 315 LBS

## 2021-06-04 VITALS — BODY MASS INDEX: 47.95 KG/M2 | WEIGHT: 315 LBS

## 2021-06-04 VITALS — WEIGHT: 315 LBS | BODY MASS INDEX: 46.93 KG/M2

## 2021-06-04 VITALS — BODY MASS INDEX: 46.69 KG/M2 | WEIGHT: 315 LBS

## 2021-06-05 VITALS
SYSTOLIC BLOOD PRESSURE: 136 MMHG | HEIGHT: 70 IN | BODY MASS INDEX: 45.1 KG/M2 | WEIGHT: 315 LBS | HEART RATE: 68 BPM | RESPIRATION RATE: 20 BRPM | DIASTOLIC BLOOD PRESSURE: 80 MMHG

## 2021-06-05 NOTE — TELEPHONE ENCOUNTER
Results and recommendations relayed to pt.  Pt verbalized understanding of the discussion and agrees to proceed with coronary angiogram with possible PCI.     Case requested.  Pt will see PCP for H&P.  -rah

## 2021-06-05 NOTE — PROGRESS NOTES
Coronary CTA with Calcium score:  Pt's initial IV in left AC infiltrated following SL NTG @ 10:47 am.  New IV inserted right AC.  11:14 HR:  65  BP  137/73.  Dr. Del Valle called and consulted re: administration of 2nd SL NTG; order obtained.  Pt dex rest of test well.  VSS:  HR:  68  BP:  151/79 at test completion.

## 2021-06-05 NOTE — PATIENT INSTRUCTIONS - HE
Below is a list of instructions we discussed today in clinic:   1. Schedule a CT coronary angiogram to look for coronary artery disease given your stress test results  2. Schedule an echocardiogram to look at structure and function of your heart.  3. Work on weight loss. This is achieved through diet and exercise. Have a goal of losing 5-6 lbs per month. By the end of a year you will feel significantly better.  4. Follow up with me in about 3 months or sooner based on test results.    Exercise:  Recommendation: Regularly exercise  -Goal for 30 to 60 minutes of moderate-intensity aerobic activity, 7 days per week (minimum 5 days per week)  -Start at 5 minutes per day  -Increase by 5 minutes every other week until goal is reached  -Intensity of exercise should be at a rate at which you cannot carry on a normal conversation    Dietary Recommendations: The Mediterranean Diet  For a heart-healthy diet, consider following a Mediterranean Diet. Information about this diet can be found at the websites below. Also pay close attention to portion sizes, as decreasing how much you eat at each meal and avoiding snacking between meals is one important way to start to lose weight.    https://www.[employer].org/healthy-lifestyle/nutrition-and-healthy-eating/in-depth/mediterranean-diet/art-30651621    You can type in the entire web address listed above or search for Mediterranean Diet at www.[employer].org    Also look here at [email or web address]  [email or web address]-diet-[email or web address]    You should receive a phone call from this office informing you of test or procedure results within 3 business days of the test being performed.  If you do not hear from our office with the test results within 1 week please do not hesitate to call asking for these results.     It was a pleasure to meet with you today in clinic.  Please do not hesitate to call the [facility] clinic with any questions or concerns at  [phone].    Sincerely,     Mitch Duncan MD  Non-invasive Cardiology  [facility]

## 2021-06-05 NOTE — TELEPHONE ENCOUNTER
----- Message from Mitch Duncan MD sent at 2/6/2020  4:23 PM CST -----  Severely elevated coronary calcium score and angiogram assessment is suggestive of significant multivessel coronary artery disease. This is more than was expected based on the stress test and his symptoms. He needs to be scheduled for a coronary angiogram with possible PCI, though I anticipate he will have surgical disease.   I attempted to call him but reached his voicemail. Did not leave a message.  Echo looks good with normal LV function and no valve disease.    JKH    Details     Reading Physician Reading Date Result Priority   Jennifer Del Valle MD 2/6/2020 Routine      Result Text     The patient's image quality is poor due to morbid obesity.  The contrast did not fill coronary artery well.     1.  The total Agatston calcium score is 3784. A calcium score in this range places the individual in the 100th percentile when compared to an age and gender matched control group and implies a very high risk of cardiac events in the next ten years.     2.  Mild disease in left main.     3.  Diffuse calcified three coronary arteries.  Due to poor image quality, cannot exclude severe stenosis in mid LAD, proximal LCX and mid RCA.     Recommend invasive coronary angiogram for further cardiac evaluation if clinically indicated.

## 2021-06-06 NOTE — TELEPHONE ENCOUNTER
----- Message -----  From: Elle Toussaint  Sent: 2/13/2020   2:29 PM CST  To: Gurinder Fry RN  Subject: Upcoming Angio Question                          General phone call:    Caller: Alvino  Primary cardiologist: CHANTAL  Detailed reason for call: Patient has angio next week and he would like information about what it will involve    Best phone number: [phone]  Best time to contact: today if possible  Ok to leave a detailed message? yes  Device?no    Additional Info:

## 2021-06-06 NOTE — ANESTHESIA PROCEDURE NOTES
Central line    Start time: 2/26/2020 7:30 AM  End time: 2/26/2020 7:44 AM  Patient location: OR Post-induction  Indications: central pressure monitoring and vascular access  Performing Anesthesiologist: Marni Frank MD  Pre-procedure Checklist  Completed: patient identified, site marked, risks, benefits, and alternatives discussed, timeout performed, consent obtained, hand hygiene performed, all elements of maximal sterile barriers used including cap, mask, gown, sterile gloves, and large sheet and skin prep agent completely dried prior to procedure    Procedure Details:  Preparation: 2% chlorhexidine  Location details: right internal jugular  Catheter type: Introducer with [place]-Mka  Introducer type: MAC  Lumens:double lumenNumber of attempts: 1    Ultrasound evaluation of access site: yes   Sterile gel and probe cover used in ultrasound-guided central venous catheter insertion  Vessel patent by US exam  Concurrent real time visualization of needle entry  Visualized anatomic structures normal  Transduced for venous waveform    Post-procedure:   line sutured and Antimicrobial disks with CHG applied  Assessment: blood return through all ports and free fluid flow  Complications: none

## 2021-06-06 NOTE — TELEPHONE ENCOUNTER
MILES to call [phone] to discuss procedure and instructions, and that info packet was mailed 2/11/20.  maximiliano

## 2021-06-06 NOTE — TELEPHONE ENCOUNTER
ACTIVITY  How is your activity tolerance? Pt is getting up during the day but not for very long periods of time, several times a day. Encouraged pt to increase his activity.    POST OP MONITORING  How is your pain on a 0-10 scale, how are you managing your pain? Pain is being managed well by tylenol and occasional oxycodone.    Are you still doing sternal precautions? Yes.  Do you hear any clicking when you are moving or taking a deep breath? No.    Are you weighing yourself daily? 340 today  - pt has gained 10 lbs since discharge. Pt states he has noted some increased edema in his legs and he has been drinking a lot water during the day due to a soar throat. Spoke to DELPHINE Arthur who instructed pt to follow this medication regimen:    Lasix 40 mg twice a day for 3 days  Potassium chloride 40 meq twice a day for 3 days  FLUID RESTRICTION - 1.5 Liters daily  Pt will call office on Monday to update on his progress.    SIGNS AND SYMPTOMS OF INFECTION  1. INCREASE IN PAIN - No  2. FEVER - No  3. DRAINAGE - No If so, color: NA  4. REDNESS - No  5. SWELLING - No    ASSISTANCE  Do you have someone at home to assist you with your daily activities? Spouse is assisting pt at home.    MEDICATIONS  Is someone helping you to set up your medications? Spouse is managing his medications.  Do you have any questions about your medications? No.    Are you on a blood thinner? No.  Who is managing your INRs? NA    FOLLOW UP  Are you scheduled for cardiac rehab? Yes, on 3/9.    You are scheduled to see our surgery advanced practive provider for post operative follow up on 3/24.  You are scheduled to see your cardiologist on 4/22.  You are scheduled to see your primary care physician on 3/5.    CONTACT INFORMATION  Please feel free to call us with any questions or symptoms that are concerning for you at [phone]. If it is after 4:30 in the afternoon, or a weekend please call [phone] and ask for the on call specialist. We want to  do everything we can to help prevent you needing to return to the ED, so please do not hesitate to call us.    Irma Everett, RNCC  Cardiovascular Surgery

## 2021-06-06 NOTE — PROGRESS NOTES
Spiritual Care Note    Spiritual Assessment:   referred to patient on his heart testing day. Patient's wife is present for support. Patient spoke openly about his heart issue being fairly new for him and that he has been having symptoms for awhile and this started a series of tests. Patient states feeling a little concerned abut the possibility of not making it through but states it isn't preventing him from moving forward with surgery. Wife states feeling worried as well like her  but is hoping everything will go well. Wife states their daughter and one of their son's will be present tomorrow on day of surgery. When asked about spiritual resources for support patient reflected briefly about events at their last [place] and states they have belonged anywhere since. Patient seems ready for surgery and thanked the  for coming and offering support. No concerns noted.     Care Provided: Patient denies spiritual/emotional needs at this time.  offered support, encouragement and information on  availability.     Plan of Care: Spiritual care will continue to follow as part of patient's care team.     DEBORAH Torres, BCC

## 2021-06-06 NOTE — ANESTHESIA PROCEDURE NOTES
Arterial Line  Reason for Procedure: hemodynamic monitoring and multiple ABGs  Patient location during procedure: OR pre-induction  Start time: 2/26/2020 7:10 AM  End time: 2/26/2020 10:17 AM  Staffing:  Performing  Anesthesiologist: Marni Frank MD  Sterile Precautions:  sterile barriers used during insertion: cap, mask, sterile gloves, large sheet, and hand hygiene used.  Arterial Line:   Immediately prior to procedure a time out was called to verify the correct patient, procedure, equipment, support staff and site/side marked as required  Laterality: right  Location: brachial  Prepped with: ChloroPrep    Needle gauge: 20 G  Number of Attempts: 1  Secured with: tape, transparent dressing and pressure dressing  Flushed with: saline  1% lidocaine local anesthesia used for skin prep.   See MAR for additional medications given.  Ultrasound evaluation of access site: yes  Vessel patent by US exam    Concurrent real time visualization of needle entry

## 2021-06-06 NOTE — PROGRESS NOTES
Cardiac Rehab  Phase II Assessment    Assessment Date: 3/6/2020    Diagnosis: CAD   Procedure: CAB x $  Date of Onset: 2/26/2020  ICD/Pacemaker: No Parameters: na  Post-op Complications: none  ECG History: SR, h/o A-fib EF%:65  Past Medical History:   Past Medical History:   Diagnosis Date     Asthma      Chronic obstructive pulmonary disease (H) 9/29/2019     Diabetes mellitus (H)     insulin pump     Diabetic neuropathy (H)      GERD (gastroesophageal reflux disease)      Hyperlipidemia      Hypertension      Morbid obesity (H)      Sleep apnea     uses CPAP     Physical Assessment  Precautions/ Physical Limitations: surgical  Oxygen: No  O2 Sats: 96 Lung Sounds: diminished in lower lobes Edema: +2 Right ankle, +2-3 Left ankle  Incisions: healing slowly  Sleeping Pattern: fair   Appetite: good   Nutrition Risk Screen: No concerns    Pain  Location: chest incision  Characteristics:soreness  Intensity: (0-10 scale) 2  Current Pain Management: Tylenol  Intervention: helps  Response: helps    Psychosocial/ Emotional Health  1. In the past 12 months, have you been in a relationship where you have been abused physically, emotionally, sexually or financially? No  notified: NA  2. Who do you turn to for emotional support?: wife  3. Do you have cultural or spiritual needs? No  4. Have there been any major life changes in the past 12 months? Yes heart surgery, recent move    Referral Information  Primary Physician: Piyush Chung MD  Cardiologist: Mitch Duncan MD  Surgeon: Daisy Russell MD    Home exercise/Equipment: Treadmill    Patient's long-term goal(s): Increase stamina with less SOB    1. Living Accommodations: [facility] Steps: Yes      Support people at home: wife   2. Marital Status:   3. Family is able to assist with cares      Jewish/Community involvement: family  4. Recreation/Hobbies: Spend time with family, going to grandchildren activities, woodworking

## 2021-06-06 NOTE — ANESTHESIA PROCEDURE NOTES
LUIS    Patient location during procedure: OR  Start time: 2/26/2020 8:00 AM  Staffing:  Performing  Anesthesiologist: Marni Frank MD  LUIS:  Type/Reason: Monitoring LUIS  Technique: blind insertion  Difficulty: easy  Anesthesia Monitoring: see additional note

## 2021-06-06 NOTE — PRE-PROCEDURE INSTRUCTIONS
PRE-OP OPEN HEART EDUCATION    DIAGNOSIS: Coronary artery disease, EF 65 (radial and SVG).  PROCEDURE: Coronary artery bypass with Dr. Russell on 2/26 at 730 AM.      Past Medical History:   Diagnosis Date     Asthma      Chronic obstructive pulmonary disease (H) 9/29/2019     Diabetes mellitus (H)     insulin pump     Diabetic neuropathy (H)      GERD (gastroesophageal reflux disease)      Hyperlipidemia      Hypertension      Morbid obesity (H)      Sleep apnea        Lab Results   Component Value Date    WBC 6.7 02/25/2020    HGB 14.0 02/25/2020    HCT 42.3 02/25/2020    MCV 93 02/25/2020     02/25/2020     Lab Results   Component Value Date    CREATININE 1.05 02/25/2020    BUN 16 02/25/2020     02/25/2020    K 3.9 02/25/2020     02/25/2020    CO2 27 02/25/2020     EKG: normal EKG, normal sinus rhythm, unchanged from previous tracings.  There were no vitals filed for this visit.      Saw patient in [facility] pre-op and discussed the following:    Procedure: purpose and course, including anesthesia induction, intubation, invasive lines and use, heart-lung bypass, sternal closing, and chest tubes/pacing wires (use and indication).    ICU course: wake up in ICU, possibly intubated, hopeful extubation within 4 hours post-op, monitoring of vital signs, titration of pressor and insulin drips. Discussed  measurement of I&Os, cid catheter, chest tubes, use of restraints. Discussed vital sign monitoring and that a nurse will be with the patient as a 1:1 immediately post-operatively. The hospital stay, uncomplicated, is an expected 5-6 days.     Pain control: use of narcotics to maintain a tolerable level of pain. Instructed patient on use of pain scale of 0-10 and instructed to not let pain become a 10 and the importance of notifying RN when pain level is increasing. Discussed that there will be an increase in sternal pain with deep breaths and activity.    Pulmonary toilet: Discussed the importance of the  use of IS, deep breathing, and strong coughing for the prevention of pneumonia and to aid in decreasing respiratory complications such as atelectasis.      Activity: dangle at bedside night of surgery, up in chair POD#1 and subsequent days TID for meals. Discussed increasing activity with cardiac rehab.  Discussed purpose of activity including increased lung compliance, decreased post-op complications such as atelectasis, PNA, DVT.    Post-operative home course and instructions including: No lifting >10lb for 6 wks, no driving 3-4wks, no overhead reaching, post-op appointments including follow-up with primary care, surgeon, and cardiologist, and cardiac rehab 2-3 days/week x 4-6 weeks. Questions and concerns answered.       _______  Irma Everett RNCC  Cardiothoracic Surgery  [phone]

## 2021-06-06 NOTE — TELEPHONE ENCOUNTER
327 lbs today after extra lasix dosing and fluid restriction. Pt instructed to return to lasix 20 mg twice a day until it is gone, and then stop.    No need for further fluid restriction. Pt instructed to weigh himself daily for two more weeks and to call if he notices fluid retention or weight gain again.    PCP saw pt post operatively and started him on cephalexin for cellulitis from old IV site. Pt said his arm is improving. His sternal incision and vein site harvest site are healing well, and do not have any complications.    Addressed all questions, and contact info was confirmed.

## 2021-06-06 NOTE — TELEPHONE ENCOUNTER
[facility]  [address] [place] [address]  [phone] (home)     Primary cardiologist:  Dr Mitch Duncan  PCP:  Piyush Chung MD  Procedure:  Coronary angiogram with possible percutaneous coronary intervention  H&P completed by:  Primary care  Case MD:  Dr Swann / Dr Hayden  Admit date and time:  2/19/20 07:30  Case start time:    Ordering MD:  Dr Duncan  Diagnosis:  Dyspnea on exertion, abnormal cardiac CT angiography  Anticoagulation: None  CPAP: Yes  Bypass Grafts: No  Renal Issues: No  Allergies: No Known Allergies  Diabetic?: Yes  Device?: No    Angiogram Teaching    Reason for Visit:  Telephone call to discuss pre-procedure education in preparation for: coronary angiogram with possible percutaneous coronary intervention    Procedure Prep:  Cardiologist note dated: 1/7/20  EKG results obtained, dated: on admission  Pertinent test results obtained - Viewable in Epic, dated: 2/6/20 CCTA & echo, 12/24/19 NM stress test  Hemogram results obtained: on admission  Basic Metabolic Panel results obtained: on admission  Lipid Profile results obtained: on admission    Pre-procedure instructions  Patient instructed to be NPO after midnight.  Patient instructed to arrange for transportation home following procedure.  Patient instructed to have a responsible adult with them for 24 hours post-procedure.  Post-procedure follow up process.  Conscious sedation discussed.  The patient was sent the pre-procedure letter (If requested) on 2/11/20    Pre-procedure medication instructions  Patient instructed on antiplatelet medication.  Continue medications as scheduled, with a small amount of water on the day of the procedure unless indicated.  Patient instructed to take 325 mg of Aspirin am of procedure: Yes  Other medication: instructed to take lisinopril and aspirin a.m. of the procedure.    *PATIENTS RECORDS AVAILABLE IN [employer] UNLESS OTHERWISE INDICATED*    *Order set was entered on this date:  2/11/20      Patient Active Problem List   Diagnosis     Diabetes mellitus (H)     Morbid obesity (H)     Cardiomyopathy (H)     Chronic obstructive pulmonary disease (H)     Hyperlipidemia     Hypertension     Sleep apnea     Insulin pump status     MAHMOOD (dyspnea on exertion)     Abnormal cardiac CT angiography       Current Outpatient Medications   Medication Sig Dispense Refill     aspirin 81 MG EC tablet Take 81 mg by mouth daily.       COMBIVENT RESPIMAT  mcg/actuation Mist inhaler INHALE 1 PUFF BY MOUTH 4 TIMES A DAY AS NEEDED  3     flash glucose scanning reader (RacktivitySTYLE ANA M 14 DAY READER) Misc Use 1 each As Directed every 14 (fourteen) days. 2 each 3     fluconazole (DIFLUCAN) 150 MG tablet Take 150 mg by mouth as needed.       FREESTYLE ANA M 14 DAY SENSOR Kit USE 1 EVERY 14 DAYS AS DIRECTED 6 kit 0     insulin aspart U-100 (NOVOLOG U-100 INSULIN ASPART) 100 unit/mL injection Inject up to 375 units subcut via the pump daily. (Patient taking differently: Inject up to 300 units subcut via the pump daily.      ) 338 mL PRN     lisinopril (PRINIVIL,ZESTRIL) 10 MG tablet Take 20 mg by mouth daily.        multivitamin (ONE A DAY) per tablet Take 1 tablet by mouth daily.       omeprazole (PRILOSEC) 20 MG capsule Take 20 mg by mouth daily.       simvastatin (ZOCOR) 20 MG tablet Take 20 mg by mouth bedtime.       triamcinolone (KENALOG) 0.025 % cream Apply topically 3 (three) times a day.       TRULICITY 1.5 mg/0.5 mL PnIj INJECT 1.5 MG UNDER THE SKIN ONCE A WEEK 6 mL 1     No current facility-administered medications for this visit.        No Known Allergies    Plan  Patient ready for procedure    Angelina Cruz RN

## 2021-06-06 NOTE — ANESTHESIA CARE TRANSFER NOTE
Last vitals:   Vitals:    02/26/20 1315   BP: 104/54   Pulse: 85   Resp: 16   Temp: 36.8  C (98.2  F)   SpO2: 92%     Patient's level of consciousness is unresponsive  Spontaneous respirations: no: ETT  Maintains airway independently: no: ETT  Dentition unchanged: yes  Oropharynx: endotracheal tube in place    QCDR Measures:  ASA# 20 - Surgical Safety Checklist: WHO surgical safety checklist completed prior to induction    PQRS# 430 - Adult PONV Prevention: 4558F-1P - Medical reason for NOT administering combination therapy  ASA# 8 - Peds PONV Prevention: NA - Not pediatric patient, not GA or 2 or more risk factors NOT present  PQRS# 424 - Loren-op Temp Management: 4559F - At least one body temp DOCUMENTED => 35.5C or 95.9F within required timeframe  PQRS# 426 - PACU Transfer Protocol: - Transfer of care checklist used  ASA# 14 - Acute Post-op Pain: ASA14B - Patient did NOT experience pain >= 7 out of 10

## 2021-06-06 NOTE — PROGRESS NOTES
ITP ASSESSMENT   Assessment Day: Initial    Session Number: 1/2  Precautions: surgical, cellulitis on right hand, deconditioned    Diagnosis: CAB    Risk Stratification: High    Referring Provider: Sandhya Snider PA-C   ITP sent to Dr. Kim  EXERCISE  Exercise Assessment: Initial       6 Minute Walk Test   Pre   Pre Exercise HR: 80                    Pre Exercise BP: 114/70      Peak  Peak HR: 91                   Peak BP: 138/60    Peak feet: 450    Peak O2 SAT: 96    Peak RPE: 8    Peak MPH: 0.85      Symptoms:  Peak Symptoms: fatigue, SOB, 2 rest breaks      5 mins. Post  5 Min Post HR: 81    5 Min Post BP: 112/74                           Exercise Plan  Goals Next 30 days  ADL'S: Goal #1: Resume dressing independently such as putting on own socks; resume showering/bathing independently without symptoms, increase to 2.5-3 METs    Leisure: Goal #2: Resume getting in and out of bed independently 3-4 METs.    Work: Goal #3: Resume 1 flight of stairs without SOB, 4 METs.    Education Goals: Patient can state cardiac s/s and appropriate emergency response.;Has system for taking medication.;Medication review    Exercise Prescription  Exercise Mode: Treadmill;Bike;Nustep;Arm Erg.;Stairs;Hallway Walking    Frequency: 3x/week    Duration: 20-30 min.    Intensity / THR: 20-30 beats above resting heart rate    RPE 11-14  Progression / Met level: 2-2.5    Resistive Training?: No      Current Exercise (mins/week): 15      Interventions  Home Exercise:  Mode: walking    Frequency: 5-10 min.    Duration: 1-2x/day      Education Material : Individual education and counseling      Education Completed  Exercise Education Completed: Cardiac Anatomy;Signs and Symptoms;Medication review;RPE;Emergency Plan;Home Exercise;Warm up/cool down;FITT Principles;BP/HR Reponse to exercise;Stretching;Strength training;Benefits of Exercise;End point of exercise              Exercise Follow-up/Discharge  Follow up/Discharge: Skilled therapy is  "needed to increase METs to 4-4.5 METs safely.  To educate on safe progression.   NUTRITION  Nutrition Assessment: Initial      Nutrition Risk Factors:  Nutrition Risk Factors: Diabetes;Dyslipidemia;Overweight  HbA1c: 6.7  Monitors blood sugar at home: Yes  Frequency: 8x/day  Cholesterol: 161  LDL: 91  HDL: 36  Triglycerides: 169      Nutrition Plan  Interventions  Diet Consult: Declined    Other Nutrition Intervention: Therapist/Pt Discussion      Education Completed  Nutrition Education Completed: Low Saturated fat diet;Risk factor overview;Low sodium diet      Goals  Nutrition Goals (Next 30 days): Patient will follow a low sodium diet;Patient will follow a low saturated fat diet      Goals Met  Nutrition Goals Met: Provided Rate your Plate Survey;Reviewed Dietitian schedule      Height, Weight, and  BMI  Weight: 339 lb 12.8 oz (154.1 kg)  Height: 5' 10\" (1.778 m)  BMI: 48.76      Nutrition Follow-up  Follow-up/Discharge: Patient declined to meet with dietician.  States that he is trying to follow a diabetic diet.         Other Risk Factors  Other Risk Factor Assessment: Initial      HTN Risk Factor: Hypertension      Pre Exercise BP: 114/70  Post Exercise BP: 112/74      Hypertension Plan  Goals  HTN Goals: Follow low sodium diet;Exercises regularly      Goals Met  HTN Goals Met: Take medication as prescribed      HTN Interventions  HTN Interventions: Therapist/patient discussion      HTN Education Completed  HTN Education Completed: Low sodium diet;Medication review;Risk factor overview      Tobacco Risk Factor: NA       PSYCHOSOCIAL  Psychosocial Assessment: Initial       [facility] Q of L Summary Score: 29      PHQ-9 Total Score: 13      Psychosocial Risk Factor: NA      Psychosocial Plan  Interventions  Patient score is  > then 9, patient states that it is mostly from lack of getting good sleep, will continue to offer emotional support to patient  Interventions: Individual education and counseling     "   Education Completed  Education Completed: Relaxation/Coping Techniques    Goals Met  Goals Met: Identified Support system      Psychosocial Follow-up  Follow-up/Discharge: Patient denies overall stress, currently does state that their is a lot going on with his surgery and they recently moved.       Patient involved in Goal setting?: Yes      Signature: _____________________________________________________________    Date: __________________    Time: __________________

## 2021-06-06 NOTE — ANESTHESIA POSTPROCEDURE EVALUATION
Patient: Walt Lambert  Procedure(s):  CORONARY ARTERY BYPASS GRAFT X 4 WITH LEFT INTERNAL MAMMARY ARTERY, WITH LEFT LEG ENDOSCOPIC VESSEL PROCUREMENT, LEFFT RADIAL ARTERY PROCUREMENT,ANESTHESIA TRANSESOPHAGEAL ECHOCARDIOGRAM,EPIAORTIC ULTRASOUND  Anesthesia type: general    Patient location: ICU  Last vitals:   Vitals Value Taken Time   /57 2/27/2020  4:35 AM   Temp 38.2  C (100.8  F) 2/27/2020  4:45 PM   Pulse 100 2/27/2020  5:58 PM   Resp 20 2/27/2020  7:00 AM   SpO2 94 % 2/27/2020  5:58 PM   Vitals shown include unvalidated device data.  Post vital signs: stable  Level of consciousness: sedated  Post-anesthesia pain: pain controlled  Post-anesthesia nausea and vomiting: no  Pulmonary: ETT, ventilator  Cardiovascular: stable, blood pressure at baseline and with pressors and inotropes  Hydration: adequate  Anesthetic events: no    QCDR Measures:  ASA# 11 - Loren-op Cardiac Arrest: ASA11B - Patient did NOT experience unanticipated cardiac arrest  ASA# 12 - Loren-op Mortality Rate: ASA12B - Patient did NOT die  ASA# 13 - PACU Re-Intubation Rate: ASA13B - Patient did NOT require a new airway mgmt  ASA# 10 - Composite Anes Safety: ASA10A - No serious adverse event    Additional Notes:

## 2021-06-06 NOTE — CONSULTS
DATE OF SERVICE: 02/21/2020    I was asked to evaluate this patient for coronary artery bypass grafting by Dr. Mitch Duncan.    HISTORY OF PRESENT ILLNESS:  Mr. Lambert is a 69-year-old gentleman who had an  abnormal stress test.  He has a 27-year history of diabetes and has a sedentary  lifestyle.  Additionally, he is morbidly obese.  He also has hypertension and  hyperlipidemia.  He underwent coronary angiography earlier this week and that  revealed severe triple vessel coronary artery disease that is quite diffuse.  His  overall left ventricular function appears to be preserved.  He does report some mild  substernal chest pain and shortness of breath as his main symptoms.  All of his  symptoms are relieved with rest.    PAST SURGICAL AND MEDICAL HISTORY:  SURGICAL PROCEDURES:  Status post gallbladder surgery.    MEDICAL PROBLEMS:  1.  Morbid obesity.  2.  Asthma.  3.  Chronic obstructive pulmonary disease.  4.  Diabetes mellitus, Insulin-dependent.  5.  Sleep apnea.  6.  Coronary artery disease.    ALLERGIES:  None known.    CURRENT MEDICATIONS:  See chart.    FAMILY HISTORY:  Positive for leukemia and heart failure in his mother and leukemia  in his father.  He has a sister with colon cancer.    SOCIAL HISTORY:  He has a job as an .  He sits at a desk all day.  He is  .  He does not smoke or ingest alcohol.    REVIEW OF SYSTEMS:  A 12-organ system review was done and is negative except for the  above.    PHYSICAL EXAMINATION:  APPEARANCE:  Obese elderly gentleman in no acute distress.  Height is 5 feet 10  inches, weight is 164.3 kilograms  VITAL SIGNS:  Today, afebrile, respiratory rate 16, heart rate 60, blood pressure  120/70.  SKIN:  No malignant appearing lesions.  He does have venous stasis ulcers involving  both legs.  LYMPH NODES:  No palpable lymphadenopathy.  HEENT:  Normocephalic.  PERRL.  EOMs intact.  ENT unremarkable.  NECK:  Supple, without carotid bruits.  CHEST:  Without  deformity.  LUNGS:  Clear to auscultation.  HEART:  Regular rate and rhythm without murmur, gallops or rubs.  Pulses 2+ and  symmetrical.  ABDOMEN:  Obese, nontender, without palpable organomegaly, normoactive bowel sounds.  GENITOURINARY AND RECTAL:  Deferred.  NEUROLOGIC:  Grossly intact.  BACK:  Without deformity.  EXTREMITIES:  Full range of motion without clubbing, cyanosis, edema.    ASSESSMENT:  This gentleman has severe diffuse triple vessel coronary artery  disease.  He does have mild to moderate left ventricular hypertrophy.  His overall  left ventricular ejection fraction is 65%.  I believe he would benefit from  multivessel coronary artery bypass grafting.  For conduit, we will use the left  internal mammary artery and reverse saphenous vein graft.  He and his wife  understand that the risks for this procedure include bleeding, infection, stroke,  sternal dehiscence, myocardial infarction, arrhythmias, the need for a pacemaker,  prolonged ventilation, pneumonia, liver/renal failure, aortic dissection and an  operative mortality of 4% to 8%.  Additionally, because of his size, we will use  sternal plates.  After we put in the sternal wires to hopefully prevent sternal  dehiscence.      Thank you very much for this referral.      FERNANDA ENNIS MD  pn  D 02/21/2020 10:37:18  T 02/21/2020 11:02:39  R 02/21/2020 11:02:39  [id number]        cc:QUINCY ENNIS MD

## 2021-06-07 NOTE — PROGRESS NOTES
Review of Systems - History obtained from the patient  General ROS: positive for  - night sweats  Psychological ROS: negative  Ophthalmic ROS: negative  ENT ROS: negative  Allergy and Immunology ROS: negative  Hematological and Lymphatic ROS: negative  Endocrine ROS: negative  Respiratory ROS: positive for - shortness of breath  Cardiovascular ROS: positive for - chest pain and shortness of breath  Gastrointestinal ROS: negative  Genito-Urinary ROS: positive for - nocturia  Musculoskeletal ROS: positive for - muscle pain and muscular weakness  Neurological ROS: positive for - daytime sleepiness  Dermatological ROS: negative  Patient is concerned about the shortness of breath please advise

## 2021-06-07 NOTE — TELEPHONE ENCOUNTER
Pt states his weight is down to 325 currently. Has finished taking his lasix. Confirmed with patient he does not need to do anything differently.

## 2021-06-07 NOTE — PROGRESS NOTES
ITP ASSESSMENT   Assessment Day: 60 Day    Session Number: 21  Precautions: surgical, cellulitis on right hand, deconditioned     Diagnosis: CAB    Risk Stratification: High    Referring Provider: Piyush Chung MD   ITP:Dr. Kim  EXERCISE  Exercise Assessment: Reassessment                             Exercise Plan  Goals Next 30 days  ADL'S: Goal #1: Increase stamina by increasing duration on TM to 20-25 mins. and home walking duration to 20 min    Leisure: Goal #2: Increase U/E strength by doing 1-2 sets of 2 lbs wts.    Work: Goal #3: Increase MET level to 4 to support goal of climbing stairs without sx.      Education Goals: All goals in this section met    Education Goals Met: Has system for taking medication.;Medication review.;Patient can state cardiac s/s and appropriate emergency response.                          Goals Met  30 day ADL'S goals met: Goal met, pt is tolerating 15 min on treadmill    30 day Leisure goals met: Goal partially met, pt has been doing arm erg 1x/week, has not started wts yet    30 day Work goals met: LTG of 4 METs not met    30 Day Progression: Pt is gradually increasing duration and intensity and feels he is tolerating home activities better.      Initial ADL's goals met: Goal met- pt has resumed putting on socks and dressing independently.    Initial Leisure goals met: Goal met- pt has resumed getting in and out of bed independently.    Intial Work goals met: Goal not met- pt is using stairs, but c/o SOB, fatigue when using them.    Initial Progression: Pt has reached a peak of 2.4 METs on Nustep so far in rehab.  He is starting to walk more regularly at home on his TM.  Pt has not had any limitations.      Exercise Prescription  Exercise Mode: Treadmill;Bike;Nustep;Arm Erg.;Stairs;Hallway Walking    Frequency: 1-2x/week    Duration: 40-45 min    Intensity / THR: 20-30 beats above resting heart rate    RPE 11-14  Progression / Met level: 2.8-3.5    Resistive Training?:  "Yes      Current Exercise (mins/week): 115      Interventions  Home Exercise:  Mode: walking, treadmill    Frequency: 15-20 min    Duration: 1-2x/day, 3-4x/week      Education Material : Individual education and counseling      Education Completed  Exercise Education Completed: Cardiac Anatomy;Signs and Symptoms;Medication review;RPE;Emergency Plan;Home Exercise;Warm up/cool down;FITT Principles;BP/HR Reponse to exercise;Stretching;Strength training;Benefits of Exercise;End point of exercise              Exercise Follow-up/Discharge  Follow up/Discharge: Skilled therapy is needed to safely increase METs to 4-4.5 METs safely.  To educate on safe progression.    NUTRITION  Nutrition Assessment: Reassessment      Nutrition Risk Factors:  Nutrition Risk Factors: Diabetes;Dyslipidemia;Overweight  Monitors blood sugar at home: Yes  Frequency: 5-6x/day      Nutrition Plan  Interventions  Diet Consult: Declined    Other Nutrition Intervention: Therapist/Pt Discussion    Initial Rate Your Plate Score: 46    Follow-Up Rate Your Plate Score: 60      Education Completed  Nutrition Education Completed: Low Saturated fat diet;Risk factor overview;Low sodium diet      Goals  Nutrition Goals (Next 30 days): Patient will follow a low saturated fat diet      Goals Met  Nutrition Goals Met: Rate Your Plate Survey Score Improved;Patient follows a low sodium diet      Height, Weight, and  BMI  Weight: 335 lb 6.4 oz (152.1 kg)  Height: 5' 10\" (1.778 m)  BMI: 48.12      Nutrition Follow-up  Follow-up/Discharge: Pt reports he follows low fat diet about 50% of the time.         Other Risk Factors  Other Risk Factor Assessment: Reassessment      HTN Risk Factor: Hypertension      Pre Exercise BP: (!) 156/94 (146/88 on re-check)  Post Exercise BP: 130/80      Hypertension Plan  Goals  HTN Goals: Patient demonstrates understanding of HTN, no goals identified for the next 30 days      Goals Met  HTN Goals Met: Follow low sodium diet;Take " medication as prescribed;Exercises regularly      HTN Interventions  HTN Interventions: Therapist/patient discussion      HTN Education Completed  HTN Education Completed: Low sodium diet;Medication review;Risk factor overview      Tobacco Risk Factor: NA        Risk Factor Follow-up   Follow-up/Discharge: Pt's BP readings have been WNLs.  He has been compliant with his meds, including a number of HTN meds.  Pt has been d/c from furosemide by his MD.     PSYCHOSOCIAL  Psychosocial Assessment: Reassessment       [facility]: 29      PHQ-9 Total Score: 13      Psychosocial Risk Factor: NA      Psychosocial Plan  Interventions  Interventions: Individual education and counseling      Education Completed  Education Completed: Relaxation/Coping Techniques      Goals  Goals (Next 30 days): Improvement in [employer] COOP score;Practicing stress management skills      Goals Met  Goals Met: Identified Support system;Oriented to stress management classes;Identify stressors      Psychosocial Follow-up  Follow-up/Discharge: Pt denies regular stress.  Pt glad he can continue working from home during coronavirus.  He does state he is a little worried about his finances during virus outbreak.  Pt reports he watches tv.             Patient involved in Goal setting?: Yes      Signature: _____________________________________________________________    Date: __________________    Time: __________________

## 2021-06-07 NOTE — PROGRESS NOTES
Review of systems are positive for dizziness, shortness of breath. All other review of systems are within normal limits.

## 2021-06-07 NOTE — PROGRESS NOTES
Walt Lambert  1950  [id number]    Reason for visit: Walt Lambert is 69 y.o. year old male  telephone routine follow-up after cardiac surgery. Hospital course was on path but notable at discharge for the following:  Of note, he had one episode of a-fib RVR responsive to amiodarone. He converted to NSR and has been in this rhythm since. He will be discharged with one month of PO amiodarone. He is not anticoagulated.    HTN was difficult to manage despite twice daily increases of his anti-HTN meds and close follow-up was advised at time discharge. He is maintained on amlodipine for radial artery graft protection for one full month.   Patient has mild bilateral lower extremity edema at discharge although he was below his admit weight (unsure of accuracy);he was sent home with 14 days of Lasix therapy with potassium supplementation. He has since had his lasix increased for a few days with a good response decreasing both weight and swelling has completed his 2 weeks of therapy.      PTA Fluconazole was discontinued at discharge due to concerns with interactions with amiodarone and atorvastatin; this can likely be resumed after 30-day course of amiodarone finished.     Insulin pump rate and restart of Trulicity per  Endocrine Dr. Alfred follow up is scheduled.      2/26/2020 with Dr. Russell  1.  Procurement of the left radial artery.  2.  Endoscopic vein procurement of the left lower extremity.  3.  Epiaortic ultrasound of the ascending aorta.  4.  Quadruple vessel coronary artery bypass grafting procedure; left internal  mammary artery to the left anterior descending coronary artery, left radial artery  to the right posterior descending coronary artery and separate reversed saphenous  vein grafts to the obtuse marginal and left anterior descending diagonal branch.    Day of discharge: 3/3/20  Discharge to home  Past Medical History:   Diagnosis Date     Asthma      Chronic obstructive pulmonary disease (H)  9/29/2019     Diabetes mellitus (H)     insulin pump     Diabetic neuropathy (H)      GERD (gastroesophageal reflux disease)      Hyperlipidemia      Hypertension      Morbid obesity (H)      Sleep apnea     uses CPAP     Past Surgical History:   Procedure Laterality Date     COLONOSCOPY N/A 9/20/2017    Procedure: COLONOSCOPY;  Surgeon: Michael Fagan MD;  Location: [facility];  Service:      CV CORONARY ANGIOGRAM N/A 2/19/2020    Procedure: Coronary Angiogram;  Surgeon: Daniel Hayden MD;  Location: [facility];  Service: Cardiology     CV LEFT HEART CATHETERIZATION WITH LEFT VENTRICULOGRAM N/A 2/19/2020    Procedure: Left Heart Catheterization with Left Ventriculogram;  Surgeon: Daniel Hayden MD;  Location: [facility];  Service: Cardiology     GALLBLADDER SURGERY       Pt is being contacted today via telephone for routine post op follow up.     Pt main concern is he is experiencing shortness of breath he is somewhat vague in his description and timing of his shortness of breath. He feels he just started noticing it and more aware. He is sleeping with his cpap, notices it if he is talking and with activity. He is participating in cardiac rehab and he states it doesn't interfere with his exercise but then states he feels more shortness of breath the more they make him do. His rehab has never been discontinued due to his shortness of breath. He is using an inhaler and that seems to help him. His weight is below baseline, his lower extremity edema is improving. He has been off his lasix for a week. He can't really say whether this shortness of breath has gotten worse since he finished his lasix. He is able to do his IS [id number].    Not sure significance of these symptoms. Will get a CXR to r/o effusion. He is going to cardiac rehab tomorrow and will go to radiology at that time for a CXR.     I will add eRx an additional week of lasix 20 mg two times a day with K+ supplementation.    I have encouraged him to continue with his rehab, and log daily weights.    Overall he feels he is doing well and healing up nicely. He is very aware of Covid19 and recommendations related to this.       Readmissions: none     Follow up with Piyush Chung MD - was seen by associate of Dr. Chung.     Follow up with Cardiology scheduled for 4/20 with Dr. Duncan    Cardiac Rehab start date 3/9/20 and doing well with exception of reported shortness of breath.    Assessment  Exam- not done due to telephone visit    /79 HR 89    Per pt report Incisions: Chest and leg incisions well healing; C/D/I without erythema, purulence, swelling. Denies any sternal movement/click with cough.    Appetite: good  Bowels: regular  Weight: below baseline    Plan    Walt Lambert is a 69 y.o. year old male status post CABG x 4     Medications: Reviewed plan for amiodarone and amlodipine- one month stop.     Surgically doing well. Incisions are healing well per report with no signs of infection. Sternum is stable, no movement noted with cough.   Radial artery graft incision is healing without reported issues.     1. Follow-up with Cardiology and Endocrinology as scheduled.    2.  Continue cardiac rehab until completed.    3. No lifting greater than 10 lbs until 4/26, then increase to 15 lbs.    Continue sternal precautions 5/26/20    4. May start driving on 3/26 if no longer taking narcotic pain medications and you feel you can be a defensive .    5. Continue to practice hand washing, not touching face and mouth,  continue with social distancing.    6. Outpt chest xray tomorrow  3/25- Call radiology to set up timing of chest xray    7. Will eRx additional week of lasix and K+ supplementation.     Return to work : 5/26/20-     No need for further follow-up with CV surgery unless concerns.        Feel free to call our office with questions. [phone]

## 2021-06-07 NOTE — PROGRESS NOTES
Walt PELLETIER Fausto has participated in 17 sessions of Phase II Cardiac Rehab.    Progress Report:   Cardiac Rehab Treatment Progress Report 4/3/2020 4/8/2020 4/10/2020 4/15/2020 4/17/2020   Weight 330 lbs 11 oz 331 lbs 6 oz 331 lbs 10 oz 334 lbs 3 oz 332 lbs   Pre Exercise  HR 78 87 83 86 77   Pre Exercise /64 108/68 112/70 122/74 128/78   Pre Blood Sugar (mg/dl) 128 87 104 131 120   Treadmill Peak HR 96 105 104 110 104   Treadmill Peak Blood Pressure - 130/60 - 146/72 148/70   Nustep Peak Heart Rate 85 90 93 92 91   Nustep Peak Blood Pressure 126/56 - 128/70 136/76 -   Heart Rate 85 93 85 87 85   Post Exercise /70 122/60 112/60 122/74 116/60   Post Blood Sugar (mg/dl) 63, 88 107 104 129 111   ECG SR SR SR SR/ST SR   Total Exercise Minutes 42 30 45 39 40         Current Status:  Therapists Comments: Pt has c/o SOB, fatigue with TM.  Denies pain.  Pt has progressed very well, up to MET level of 3.  Goal is to progress to 4-4.5 MET level.  Pt reports walking at home on his TM for 5 min at a time.    If Physician recommends change in treatment plan, please place orders.        __________________________________________________      _____________  Signature                                                                                                  Date

## 2021-06-07 NOTE — PATIENT INSTRUCTIONS - HE
1. Follow-up with cardiology as scheduled.    2.  Continue cardiac rehab until completed.    3. No lifting greater than 10 lbs until 4/26, then increase to 15 lbs.    Continue sternal precautions 5/26/20    4. May start driving on 3/26 if no longer taking narcotic pain medications and you feel you can be a defensive .    5. Continue to practice hand washing, not touching face and mouth,  continue with social distancing.    6. Out pt CXR tomorrow 3/25- Call radiology to set up timing of CXE    7. Will refill Lasix for additional week.      Return to work : 5/26/20-     No need for further follow-up with CV surgery unless concerns.        Feel free to call our office with questions. [phone]

## 2021-06-07 NOTE — PROGRESS NOTES
"Walt Lambert is a 69 y.o. male who is being evaluated via a billable telephone visit.      The patient has been notified of following:     \"This telephone visit will be conducted via a call between you and your physician/provider. We have found that certain health care needs can be provided without the need for a physical exam.  This service lets us provide the care you need with a short phone conversation.  If a prescription is necessary we can send it directly to your pharmacy.  If lab work is needed we can place an order for that and you can then stop by our lab to have the test done at a later time.    If during the course of the call the physician/provider feels a telephone visit is not appropriate, you will not be charged for this service.\"     Patient has given verbal consent to a Telephone visit? Yes    Walt Lambert complains of    Chief Complaint   Patient presents with     Diabetes Mellitus       I have reviewed and updated the patient's Past Medical History, Social History, Family History and Medication List.    ALLERGIES  Patient has no known allergies.    Additional provider notes:    Alvino is contacted today in f/u for DM 2. He has just had a quadruple bypass. He reports that he is feeling well and recovering. He is doing Cardiac Rehab and is feeling pretty well.  His current A1c is 6.1 and he continues to use the Freestyle Ivelisse to track his BG and uses the T-Slim insulin pump.  He notes that he was taken off of Trulicity while he was in Hospital. He feels that his BG were better regulated while on it. There was perhaps concern regarding his nutrition and healing. He would like to restart it. BG look good of those he has supplied today.       3-27  7:00am 128    3-28      3-29      3-30      3-31      4-1      4-2      4-3        Assessment/Plan    Alvino will continue with his current pump settings. He will add back the Trulicity barring some " complication with his healing. He will f/u with me in 6 months, sooner with problems or concerns.     Total time of call between patient and provider was 20 minutes    Kennedy MATA

## 2021-06-07 NOTE — PROGRESS NOTES
ITP ASSESSMENT   Assessment Day: 30 Day    Session Number: 12  Precautions: surgical, cellulitis on right hand, deconditioned     Diagnosis: CAB    Risk Stratification: High    Referring Provider: Piyush Chung MD  EXERCISE  Exercise Assessment: Reassessment                              Exercise Plan  Goals Next 30 days  ADL'S: Goal #1: Increase stamina by increasing duration on TM to 15 mins.    Leisure: Goal #2: Increase U/E strength by doing arm erg 1x/week with arms and doing 1-2 sets of 2 lbs wts.    Work: Goal #3: Increase MET level to 4 to support goal of climbing stairs without sx.      Education Goals: Patient can state cardiac s/s and appropriate emergency response.    Education Goals Met: Has system for taking medication.;Medication review.                          Goals Met  Initial ADL's goals met: Goal met- pt has resumed putting on socks and dressing independently.    Initial Leisure goals met: Goal met- pt has resumed getting in and out of bed independently.    Intial Work goals met: Goal not met- pt is using stairs, but c/o SOB, fatigue when using them.    Initial Progression: Pt has reached a peak of 2.4 METs on Nustep so far in rehab.  He is starting to walk more regularly at home on his TM.  Pt has not had any limitations.      Exercise Prescription  Exercise Mode: Treadmill;Bike;Nustep;Arm Erg.;Stairs;Hallway Walking    Frequency: 1-2x/week    Duration: 25-40 mins    Intensity / THR: 20-30 beats above resting heart rate    RPE 11-14  Progression / Met level: 2.5-3    Resistive Training?: Yes      Current Exercise (mins/week): 75      Interventions  Home Exercise:  Mode: Walking, TM    Frequency: 5-10 mins    Duration: 1-2x/day      Education Material : Individual education and counseling      Education Completed  Exercise Education Completed: Cardiac Anatomy;Signs and Symptoms;Medication review;RPE;Emergency Plan;Home Exercise;Warm up/cool down;FITT Principles;BP/HR Reponse to  "exercise;Stretching;Strength training;Benefits of Exercise;End point of exercise              Exercise Follow-up/Discharge  Follow up/Discharge: Skilled therapy is needed to increase METs to 4-4.5 METs safely.  To educate on safe progression.            NUTRITION  Nutrition Assessment: Reassessment      Nutrition Risk Factors:  Nutrition Risk Factors: Diabetes;Dyslipidemia;Overweight  HbA1c: 6.7  Monitors blood sugar at home: Yes  Frequency: 5-6x/day  Cholesterol: 161  LDL: 91  HDL: 36  Triglycerides: 169      Nutrition Plan  Interventions  Diet Consult: Declined    Other Nutrition Intervention: Therapist/Pt Discussion    Initial Rate Your Plate Score: 46      Education Completed  Nutrition Education Completed: Low Saturated fat diet;Risk factor overview;Low sodium diet      Goals  Nutrition Goals (Next 30 days): Patient will follow a low sodium diet;Patient will follow a low saturated fat diet      Goals Met  Nutrition Goals Met: Provided Rate your Plate Survey;Reviewed Dietitian schedule      Height, Weight, and  BMI  Weight: 328 lb 6.4 oz (149 kg)  Height: 5' 10\" (1.778 m)  BMI: 47.12      Nutrition Follow-up  Follow-up/Discharge: Pt reports he is following a heart healthy diet by limiting his salt, increasing fruits and vegetables, and decreasing red meat intake.  He has lost about 10 lbs since starting rehab (some likely due to being on diuretic).  Pt and his wife both cook.       Other Risk Factors  Other Risk Factor Assessment: Reassessment      HTN Risk Factor: Hypertension      Pre Exercise BP: 126/62  Post Exercise BP: 110/62      Hypertension Plan  Goals  HTN Goals: Follow low sodium diet;Exercises regularly      Goals Met  HTN Goals Met: Take medication as prescribed      HTN Interventions  HTN Interventions: Therapist/patient discussion      HTN Education Completed  HTN Education Completed: Low sodium diet;Medication review;Risk factor overview      Tobacco Risk Factor: NA        Risk Factor Follow-up   " Follow-up/Discharge: Pt's BP readings have been WNLs.  He has been compliant with his meds, including a number of HTN meds.  Pt has been d/c from furosemide by his MD.         PSYCHOSOCIAL  Psychosocial Assessment: Reassessment         Psychosocial Risk Factor: NA      Psychosocial Plan  Interventions  Interventions: Individual education and counseling      Education Completed  Education Completed: Relaxation/Coping Techniques      Goals  Goals (Next 30 days): Improvement in [employer] COOP score;Practicing stress management skills      Goals Met  Goals Met: Identified Support system;Oriented to stress management classes;Identify stressors      Psychosocial Follow-up  Follow-up/Discharge: Pt denies regular stress.  Pt glad he can continue working from home during coronavirus.  He does state he is a little worried about his finances during virus outbreak.           Patient involved in Goal setting?: Yes      Signature: _____________________________________________________________    Date: __________________    Time: __________________

## 2021-06-08 NOTE — PROGRESS NOTES
ITP ASSESSMENT   Assessment Day: 90 Day    Session Number: 28  Precautions: surgical, cellulitis on right hand, deconditioned     Diagnosis: CAB    Risk Stratification: High    Referring Provider: Piyush Chung MD  EXERCISE  Exercise Assessment: Reassessment                             Exercise Plan  Goals Next 30 days  ADL'S: Goal #1: Increase stamina by increasing duration on TM to 20-25 mins.    Leisure: Goal #2: Increase U/E strength by doing 1-2 sets of 2 lbs wts.     Work: Goal #3: Increase MET level to 4 to support goal of climbing stairs without sx.       Education Goals: All goals in this section met    Education Goals Met: Has system for taking medication.;Medication review.;Patient can state cardiac s/s and appropriate emergency response.                          Goals Met  60 day ADL'S goals met: Goal partially met- pt has walked 20 mins at home, but not on TM in rehab.    60 day Leisure goals met: Goal not met- pt has only reached 1 lb wts, not 2 lbs.    60 day Work goals met: Goal not met- pt has not reached 4 METs.    60 Day Progression: Pt has reached peak of 2.8 METs in rehab.  He reports walking on occ at home, either outside or on his TM.  He is somewhat limited by SOB and deconditioning.      30 day ADL'S goals met: Goal met, pt is tolerating 15 min on treadmill    30 day Leisure goals met: Goal partially met, pt has been doing arm erg 1x/week, has not started wts yet    30 day Work goals met: LTG of 4 METs not met    30 Day Progression: Pt is gradually increasing duration and intensity and feels he is tolerating home activities better.      Initial ADL's goals met: Goal met- pt has resumed putting on socks and dressing independently.    Initial Leisure goals met: Goal met- pt has resumed getting in and out of bed independently.    Intial Work goals met: Goal not met- pt is using stairs, but c/o SOB, fatigue when using them.    Initial Progression: Pt has reached a peak of 2.4 METs on  "Rain so far in rehab.  He is starting to walk more regularly at home on his TM.  Pt has not had any limitations.      Exercise Prescription  Exercise Mode: Treadmill;Bike;Nustep;Arm Erg.;Stairs;Hallway Walking    Frequency: 2x/week    Duration: 30-45 mins    Intensity / THR: 20-30 beats above resting heart rate    RPE 11-14  Progression / Met level: 3-4    Resistive Training?: Yes      Current Exercise (mins/week): 120      Interventions  Home Exercise:  Mode: Walking, TM    Frequency: 2-3x/week    Duration: 15-25 mins      Education Material : Individual education and counseling      Education Completed  Exercise Education Completed: Cardiac Anatomy;Signs and Symptoms;Medication review;RPE;Emergency Plan;Home Exercise;Warm up/cool down;FITT Principles;BP/HR Reponse to exercise;Stretching;Strength training;Benefits of Exercise;End point of exercise              Exercise Follow-up/Discharge  Follow up/Discharge: Skilled therapy is needed to safely increase METs to 4-4.5 METs safely.  He also needs encouragement to do and stay consistent with home ex.   NUTRITION  Nutrition Assessment: Reassessment      Nutrition Risk Factors:  Nutrition Risk Factors: Diabetes;Dyslipidemia;Overweight  Monitors blood sugar at home: Yes  Frequency: 5-6x/day      Nutrition Plan  Interventions  Diet Consult: Declined    Other Nutrition Intervention: Therapist/Pt Discussion    Initial Rate Your Plate Score: 46    Follow-Up Rate Your Plate Score: 60      Education Completed  Nutrition Education Completed: Low Saturated fat diet;Risk factor overview;Low sodium diet      Goals  Nutrition Goals (Next 30 days): Patient will follow a low saturated fat diet      Goals Met  Nutrition Goals Met: Rate Your Plate Survey Score Improved;Patient follows a low sodium diet      Height, Weight, and  BMI  Weight: 339 lb 14.4 oz (154.2 kg)  Height: 5' 10\" (1.778 m)  BMI: 48.77      Nutrition Follow-up  Follow-up/Discharge: Pt reports he reads labels, " especially for sodium and sugar/carbs.  He sees an endocrinologist for his DM treatment.  Pt has recently lost 25 lbs since his heart surgery, and his wt goal is to keep his maintain his current weight at 335-340 lbs.         Other Risk Factors  Other Risk Factor Assessment: Reassessment      HTN Risk Factor: Hypertension      Pre Exercise BP: 138/80  Post Exercise BP: 128/82      Hypertension Plan  Goals  HTN Goals: Patient demonstrates understanding of HTN, no goals identified for the next 30 days      Goals Met  HTN Goals Met: Follow low sodium diet;Take medication as prescribed;Exercises regularly      HTN Interventions  HTN Interventions: Therapist/patient discussion      HTN Education Completed  HTN Education Completed: Low sodium diet;Medication review;Risk factor overview      Tobacco Risk Factor: NA        Risk Factor Follow-up   Follow-up/Discharge: Pt reports being compliant with his meds.  He does use a pillbox to help organize them.  Pt's BP has been borderline high during rehab.     PSYCHOSOCIAL  Psychosocial Assessment: Reassessment         Psychosocial Risk Factor: NA      Psychosocial Plan  Interventions  Interventions: Individual education and counseling      Education Completed  Education Completed: Relaxation/Coping Techniques      Goals  Goals (Next 30 days): Improvement in [employer] COOP score;Practicing stress management skills      Goals Met  Goals Met: Identified Support system;Oriented to stress management classes;Identify stressors      Psychosocial Follow-up  Follow-up/Discharge: Pt denies regular stress.  He does identify work and finances as occ stressors.             Patient involved in Goal setting?: Yes      Signature: _____________________________________________________________    Date: __________________    Time: __________________

## 2021-06-09 NOTE — PROGRESS NOTES
[facility] Home Exercise Program/Discharge Summary  You have reached a 4.4 MET level and have completed 36 sessions of Cardiac Rehab.   Exercise Goals:   4-6x/week for aerobic exercise 30-60 minutes and 2-3x/week for strength training.   Modality Duration Intensity/  Rate of Perceived Exertion  OMNI Scale (1-10)   Warm-up 5 minutes 2-3   Walk 10-15 minutes 4-7   Treadmill 10-15 minutes 4-7   Swimming  10-15 minutes 4-7   Cool Down 5 minutes 4-7   Strength Training *every other day 10-15 minutes 3 sets of 10 reps  Increase weights as tolerated.   Stretching 5 minutes 2-3   Continuous Exercise Heart Rate Guidelines:   Cesario 123-134bpm      Special Recommendations:    Continue to follow low fat, low salt, heart healthy diet.    Continue to follow up with your doctors/providers as recommended (e.g cholesterol).    A well rounded exercise program will included aerobic/cardiovascular exercise (e.g like walking, biking, or swimming ), strength training (e.g. free weights, exercise bands, or weight machines) and stretching program.   Stop Exercise!!! If any of the following occur:    Angina/chest pain    Dizziness    Excessive perspiration/cold sweats    Abnormal shortness of breath    Changes in heart rate (slow, fast, irregular)    Sudden fatigue or numbness    Nausea  Also...    Avoid extreme temperatures - exercise indoors if necessary:   Temp+ Humidity >160, Temp-Wind Chill <20    Wait at least 1 hour after a meal before strenuous activity    Do not exercise if you have a fever or are ill    Wear comfortable, supportive athletic clothing and shoes.  You are now on your way to a heart healthy lifestyle on your own. You can do it!

## 2021-06-09 NOTE — PROGRESS NOTES
ITP ASSESSMENT   Assessment Day: 120 Day (Last Day)    Session Number: 35/36  Precautions: surgical, cellulitis on right hand, deconditioned     Diagnosis: CAB    Risk Stratification: High    Referring Provider: Piyush Chung MD   ITP: Dr. Kim   EXERCISE  Exercise Assessment: Discharge       6 Minute Walk Test   Pre   Pre Exercise HR: 82                    Pre Exercise BP: 132/78      Peak  Peak HR: 115                   Peak BP: 182/82    Peak feet: 1255    Peak O2 SAT: 91    Peak RPE: 7-8    Peak MPH: 2.38      Symptoms:  Peak Symptoms: SOB- moderate, fatigue       5 mins. Post  5 Min Post HR: 90    5 Min Post BP: 142/78                           Exercise Plan  Goals Next 30 days  ADL'S: Goal #1: Increase stamina by increasing duration on TM to 20-25 mins.    Leisure: Goal #2: Increase U/E strength by doing 1-2 sets of 2 lbs wts.     Work: Goal #3: Increase MET level to 4 to support goal of climbing stairs without sx.       Education Goals: All goals in this section met    Education Goals Met: Has system for taking medication.;Medication review.;Patient can state cardiac s/s and appropriate emergency response.                          Goals Met  90 day ADL'S goals met: Goal #1 Not MET: Pt able to tolerate 15 min and does feel that is his max at this time due to fatigue, SOB.    90 day Leisure goals met: Goal #2 MET: Pt has tolerated 2 sets of 10 reps with 2lb weights without any complication.     90 day Work goals met: Goal #3 MET: Pt did tolerate stairs at a 4.4 MET Level for 5 minutes in cardiac rehab.    90 Day Progress: Pt has reached a 4.4 MET on the stairs for 5 minutes. Pt has reached a 2.8 MET on the TM for 10-15 minutea na d a 2.5 MET on the Nustep for 20 minutea and has utilized the arm ergometer for 10 min and has used 2lbs 2 sets x 10 reps. Pt limited by SOB, fatigue, B knee pain. Pt did reach MET Level goal and is motivated to continue to increase endurance and stamina.       60 day ADL'S  goals met: Goal partially met- pt has walked 20 mins at home, but not on TM in rehab.    60 day Leisure goals met: Goal not met- pt has only reached 1 lb wts, not 2 lbs.    60 day Work goals met: Goal not met- pt has not reached 4 METs.    60 Day Progression: Pt has reached peak of 2.8 METs in rehab.  He reports walking on occ at home, either outside or on his TM.  He is somewhat limited by SOB and deconditioning.      30 day ADL'S goals met: Goal met, pt is tolerating 15 min on treadmill    30 day Leisure goals met: Goal partially met, pt has been doing arm erg 1x/week, has not started wts yet    30 day Work goals met: LTG of 4 METs not met    30 Day Progression: Pt is gradually increasing duration and intensity and feels he is tolerating home activities better.      Initial ADL's goals met: Goal met- pt has resumed putting on socks and dressing independently.    Initial Leisure goals met: Goal met- pt has resumed getting in and out of bed independently.    Intial Work goals met: Goal not met- pt is using stairs, but c/o SOB, fatigue when using them.    Initial Progression: Pt has reached a peak of 2.4 METs on Nustep so far in rehab.  He is starting to walk more regularly at home on his TM.  Pt has not had any limitations.      Exercise Prescription  Intensity / THR: 20-30 beats above resting heart rate        Current Exercise (mins/week): 70      Interventions  Home Exercise:  Mode: Walking    Frequency: 4-6x/week    Duration: 15-20 minutes 1-2x/day       Education Material : Educational videos;Provide written material;Individual education and counseling;Offer educational classes      Education Completed  Exercise Education Completed: Cardiac Anatomy;Signs and Symptoms;Medication review;RPE;Emergency Plan;Home Exercise;Warm up/cool down;FITT Principles;BP/HR Reponse to exercise;Stretching;Strength training;Benefits of Exercise;End point of exercise              Exercise Follow-up/Discharge  Follow up/Discharge: Pt  "completed recommened sessions and did reach a 4.4 MET Level. Pt had great improvement in 6'walk and survey scores. Pt is aware to be more diligent with his heart healthy lifestyle regarding his diet and exercise. Pt is very pleased with his progress and has had an increase in his endurance and stamina. PT is back to doing many activities and did reach MET Level goals.    NUTRITION  Nutrition Assessment: Discharge      Nutrition Risk Factors:  Nutrition Risk Factors: Diabetes;Dyslipidemia;Overweight  HbA1c: 6.1 (3/27/2020)  Monitors blood sugar at home: Yes  Frequency: 6-8x/day       Nutrition Plan  Interventions  Diet Consult: Declined    Other Nutrition Intervention: Therapist/Pt Discussion;Provide with Written Material    Initial Rate Your Plate Score: 46    Follow-Up Rate Your Plate Score: 60      Education Completed  Nutrition Education Completed: Low Saturated fat diet;Risk factor overview;Low sodium diet;Weight management      Goals  Nutrition Goals (Next 30 days): Patient will follow a low saturated fat diet      Goals Met  Nutrition Goals Met: Reviewed Dietitian schedule;Provided Rate your Plate Survey;Completed Nutritional Risk Screen;Patient can identify their risk factors for CAD;Rate Your Plate Survey Score Improved;Patient follows a low sodium diet      Height, Weight, and  BMI  Weight: 339 lb 11.2 oz (154.1 kg)  Height: 5' 10\" (1.778 m)  BMI: 48.74      Nutrition Follow-up  Follow-up/Discharge: Pt and wife do the cooking and is cautious of sodium and his carb intake. Pt continues to see endocrinologist for his DM treatment. Pt has maintained his weight over the past 30 days and has been consistent with weighing himself daily. Pt is compliant with statin medication. Pt has been eating out 3-4x/week and we did talk about decreasing that by 1 day. Pt is trying to choose healthier options. Pt does drink water for hydration. Pt does not have any other questions at this time. PT does admit he hasn't been as " compliant with his diet due to having his kitchen remodeled so he is going to be getting back on track with this. He is following a heart healthy about 60% of the time.          Other Risk Factors  Other Risk Factor Assessment: Discharge      HTN Risk Factor: Hypertension      Pre Exercise BP: 132/78  Post Exercise BP: 128/78      Hypertension Plan    Goals Met  HTN Goals Met: Take medication as prescribed (Pt has not been as diligent with exercise recently)      HTN Interventions  HTN Interventions: Therapist/patient discussion;Provide written material;Offer educational classes (Pt has met with dieticians in the past)      HTN Education Completed  HTN Education Completed: Low sodium diet;Medication review;Risk factor overview      Tobacco Risk Factor: NA      Risk Factor Follow-up   Follow-up/Discharge: Pt is compliant with metoprolol-lisinopril was d/c'd and pt is tracking bp at home to record for MD. BP still runs mildly hypertensive. Pt does check his bp eveyr morning and his oxygen saturations.      PSYCHOSOCIAL  Psychosocial Assessment: Discharge       [facility] Q of L Summary Score: 18      PHQ-9 Total Score: 3      Psychosocial Risk Factor: Stress      Psychosocial Plan  Interventions  Interventions: Offer educational videos and classes;Provide written material;Individual education and counseling      Education Completed  Education Completed: Relaxation/Coping Techniques;S/S of depression;Effects of stress on body          Goals Met  Goals Met: Identified Support system;Oriented to stress management classes;Identify stressors;Improvement in [employer] score;Practicing stress management skills (29-18)      Psychosocial Follow-up  Follow-up/Discharge: Pt denies regular stress and is in the process of selling his home with does cause stress at this time. Pt enjoys spending time with his family for relaxation. Pt does enjoy watching TV. Pt has a good support system in his family. Pt is very pleased in  his progress in cardiac rehab and his confidence has improved.              Patient involved in Goal setting?: Yes

## 2021-06-11 NOTE — TELEPHONE ENCOUNTER
Wellness Screening Tool  Symptom Screening:  Do you have one of the following NEW symptoms:    Fever (subjective or >100.0)?  No    A new cough?  No    Shortness of breath?  No     Chills? No     New loss of taste or smell? No     Generalized body aches? No     New persistent headache? No     New sore throat? No     Nausea, vomiting, or diarrhea?  No    Within the past 2 weeks, have you been exposed to someone with a known positive illness below:    COVID-19 (known or suspected)?  No    Chicken pox?  No    Mealses?  No    Pertussis?  No    Patient notified of visitor policy- They may have one person accompany them to their appointment, but they will need to wear a mask and will be screened upon arrival for symptoms: Yes  Pt informed to wear a mask: Yes  Pt notified if they develop any symptoms listed above, prior to their appointment, they are to call the clinic directly at [phone] for further instructions.  Yes  Patient's appointment status: Patient will be seen in clinic as scheduled on 9/17

## 2021-06-12 NOTE — TELEPHONE ENCOUNTER
Last office visit: 10-  Last labs: 10-    Future appointment: 04-09-20201  Future lab appointment: 04-

## 2021-06-12 NOTE — PROGRESS NOTES
"Walt Lambert is a 69 y.o. male who is being evaluated via a billable video visit.      The patient has been notified of following:     \"This video visit will be conducted via a call between you and your physician/provider. We have found that certain health care needs can be provided without the need for an in-person physical exam.  This service lets us provide the care you need with a video conversation.  If a prescription is necessary we can send it directly to your pharmacy.  If lab work is needed we can place an order for that and you can then stop by our lab to have the test done at a later time.    Video visits are billed at different rates depending on your insurance coverage. Please reach out to your insurance provider with any questions.    If during the course of the call the physician/provider feels a video visit is not appropriate, you will not be charged for this service.\"    Patient has given verbal consent to a Video visit? Yes  How would you like to obtain your AVS? AVS Preference: MyChart.  If dropped by the video visit, the video invitation should be sent to: Text to cell phone: [phone]  Will anyone else be joining your video visit? No        Video Start Time:  0750    Additional provider notes:      Reason for visit      1. Type 2 diabetes mellitus with other circulatory complication, with long-term current use of insulin (H)        HPI     Walt Lambert is a very pleasant 69 y.o. old male who presents for follow up.  SUMMARY:    Alvino is contacted today in f/u for DM 2. His current A1c is 8.9 and up significantly from his last at 6.1. He is s/p Quad CABG. He reports that he has been much more sedentary, not only because of the surgery, but also because of COVID restrictions. He states that he has been indulging in dietary indiscretions as well. He has always managed his BG significantly with movement and diet. He states that he has also been having some problems with his TANDEM system " and has been working with them to right the situation. He notes that he skipped two meals yesterday and went for a walk, and that it helped his BG significantly. Alvino is reporting what sounds like some PVD in the graft leg. He states that the leg hurts when he is sitting, but when he goes out walking the pain dissipates. He declined a referral to Vascular today. Alvino was able to get me a pump download today. He had an ave BG of 240 over the last two days. He did not have any hypoglycemia. He was above target 100% of the time, which is not his usual. He is using about 325 units of insulin a day and testing 5 times a day.       Blood glucose data:      Past Medical History     Patient Active Problem List   Diagnosis     Diabetes mellitus (H)     Morbid obesity (H)     Cardiomyopathy (H)     Chronic obstructive pulmonary disease (H)     Hyperlipidemia     Hypertension     Sleep apnea     Insulin pump status     MAHMOOD (dyspnea on exertion)     Abnormal cardiac CT angiography     CAD (coronary artery disease)     S/P CABG (coronary artery bypass graft)     ARF (acute respiratory failure) (H)     Anemia due to blood loss, acute     Postoperative pain     Arteriosclerosis of coronary artery bypass graft     Body mass index (BMI) 50.0-59.9, adult     Pure hypertriglyceridemia     Tobacco user        Family History       family history includes Colon cancer in his sister; Heart failure in his mother; Leukemia in his father and mother.    Social History      reports that he has quit smoking. He has never used smokeless tobacco. He reports current alcohol use. He reports that he does not use drugs.      Review of Systems     Patient has no polyuria or polydipsia, no chest pain, dyspnea or TIA's, no numbness, tingling or pain in extremities  Remainder negative except as noted in HPI.    Vital Signs     There were no vitals taken for this visit.  Wt Readings from Last 3 Encounters:   09/17/20 (!) 345 lb (156.5 kg)   06/22/20 (!)  339 lb 11.2 oz (154.1 kg)   06/17/20 (!) 341 lb 12.8 oz (155 kg)             Assessment     1. Type 2 diabetes mellitus with other circulatory complication, with long-term current use of insulin (H)        Plan     Alvino needs to find some motivation to move and not to eat as he has been doing. Strongly encouraged him to get back at it.  We will f/u together in 6 months.         Lab Results     Hemoglobin A1c   Date Value Ref Range Status   10/02/2020 8.9 (H) <=5.6 % Final     Comment:     Normal <5.7% Prediabete 5.7-6.4% Diabletes 6.5% or higher - adopted from ADA consensus guidelines   03/27/2020 6.1 (H) 3.5 - 6.0 % Final   02/25/2020 6.7 (H) 4.2 - 6.1 % Final   03/28/2019 6.8 (H) 3.5 - 6.0 % Final   09/24/2018 7.0 (H) 3.5 - 6.0 % Final     Creatinine   Date Value Ref Range Status   10/02/2020 1.09 0.70 - 1.30 mg/dL Final   03/03/2020 1.04 0.70 - 1.30 mg/dL Final   03/01/2020 1.11 0.70 - 1.30 mg/dL Final     Microalbumin, Random Urine   Date Value Ref Range Status   10/02/2020 17.87 (H) 0.00 - 1.99 mg/dL Final       Cholesterol   Date Value Ref Range Status   02/19/2020 161 <=199 mg/dL Final     HDL Cholesterol   Date Value Ref Range Status   02/19/2020 36 (L) >=40 mg/dL Final     LDL Calculated   Date Value Ref Range Status   02/19/2020 91 <=129 mg/dL Final     Triglycerides   Date Value Ref Range Status   02/19/2020 169 (H) <=149 mg/dL Final       Lab Results   Component Value Date    ALT 80 (H) 12/20/2019    AST 51 (H) 12/20/2019    ALKPHOS 118 12/20/2019    BILITOT 0.9 12/20/2019         Current Medications     Outpatient Medications Prior to Visit   Medication Sig Dispense Refill     ADVAIR DISKUS 250-50 mcg/dose DISKUS Inhale 1 puff 2 (two) times a day.       aspirin 81 MG EC tablet Take 81 mg by mouth daily.       atorvastatin (LIPITOR) 80 MG tablet Take 1 tablet (80 mg total) by mouth daily. 30 tablet 11     flash glucose scanning reader (IDx ANA M 14 DAY READER) Misc Use 1 each As Directed every 14  (fourteen) days. 2 each 3     flash glucose sensor (FREESTYLE ANA M 14 DAY SENSOR) Kit Use 1 each As Directed every 14 (fourteen) days. 6 kit 1     furosemide (LASIX) 40 MG tablet Take 1 tablet by mouth 2 (two) times a day.        insulin aspart U-100 (NOVOLOG U-100 INSULIN ASPART) 100 unit/mL injection use 375 units of insulin daily via the pump 115 mL 5     insulin pump cartridge Crtg Inject 4 Units/hr under the skin continuous. Bolus average 15-20 units before meals (depends on meal)   1:20 insulin to carb ratio  1 unit insulin to lower glucose by 5mg/dl 1 Cartridge 0     insulin pump cartridge Inject under the skin continuous. Insulin pump discharge instructions from 03/03/20.  This is intended as additional information to the patient's PTA insulin pump order.  Continue with insulin pump at present settings. 1 each 0     lisinopriL (PRINIVIL,ZESTRIL) 20 MG tablet Take 1 tablet (20 mg total) by mouth 2 (two) times a day. 180 tablet 3     metoprolol tartrate (LOPRESSOR) 100 MG tablet Take 1 tablet (100 mg total) by mouth 2 (two) times a day. 180 tablet 2     multivitamin (ONE A DAY) per tablet Take 1 tablet by mouth daily.       omeprazole (PRILOSEC) 20 MG capsule Take 20 mg by mouth daily.       TRULICITY 1.5 mg/0.5 mL PnIj INJECT 0.5 ML UNDER THE SKIN ONCE A WEEK. 2 Syringe 0     acetaminophen (TYLENOL) 325 MG tablet Take 2 tablets (650 mg total) by mouth every 4 (four) hours as needed.  0     COMBIVENT RESPIMAT  mcg/actuation Mist inhaler INHALE 1 PUFF BY MOUTH 4 TIMES A DAY AS NEEDED  3     nitroglycerin (NITROSTAT) 0.4 MG SL tablet Place 1 tablet (0.4 mg total) under the tongue every 5 (five) minutes as needed for chest pain (chest discomfort). Take 1 tablet sublingual for chest symptoms and allow to dissolve under tongue without swallowing.  If symptoms do not resolve in 5 minutes, repeat dose and contact EMS by calling 911.  Continue to repeat dose sublingual every 5 minutes for total 4 doses. 1  Bottle 0     triamcinolone (KENALOG) 0.025 % cream Apply 1 application topically 3 (three) times a day as needed.        oxyCODONE (ROXICODONE) 5 MG immediate release tablet Take 1 tablet (5 mg total) by mouth every 6 (six) hours as needed for pain. 15 tablet 0     No facility-administered medications prior to visit.              Video-Visit Details    Type of service:  Video Visit    Video End Time (time video stopped): 0812  Originating Location (pt. Location): Home    Distant Location (provider location):  [facility]     Platform used for Video Visit: Lindsey MATA

## 2021-06-15 NOTE — PROGRESS NOTES
Assessment & Plan: Walt is here for a sensor study glucose review.  His sensor is fully adhered to his skin, but stopped working after 2 days of glucose data.  His blood sugars show little variation and average 280-320 for the 2 days it obtained data.  Because it did not last 3 days, a new sensor was placed on him today.  We will follow up on his sensor data next Friday.  He did not bring his meter with but states he had one blood sugar <200, but the rest were 200-400.  I discussed titrating Alvino's insulin doses to reach BG goals with Christina Alfred NP.  We will increase his levemir from 70/60 (AM/PM doses) to 75/70.  I encouraged Walt to reduce his sweets and carbs to prevent more weight gain.  We will follow up next Friday to review his blood sugars and titrate his insulin doses if needed.    Time spent with the patient: 30 minutes for diabetes education and counseling.   Previous Education: yes  Visit Type:DSMT      Rodney Osorio  1/26/2018           I agree with the aforementioned diabetes plan.  Emily LARES [facility]  1/27/2018  8:40 AM

## 2021-06-15 NOTE — PROGRESS NOTES
Walt Lambert is a 70 y.o. male who is being evaluated via a billable video visit.      How would you like to obtain your AVS? MyChart.  If dropped from the video visit, the video invitation should be resent by: Text to cell phone: [phone]  Will anyone else be joining your video visit? No      Video Start Time: 1340      Reason for visit      1. Type 2 diabetes mellitus with other circulatory complication, with long-term current use of insulin (H)        HPI     Walt Lambert is a very pleasant 70 y.o. old male who presents for follow up.  SUMMARY:    Alvino is contacted today via Video Visit in f/u for DM 2. His current A1c is 9.5 and continues to be much higher than a year ago at 6.1. He is working from home and getting a lot less activity/movement in than he has in the past. He also reports that the refrigerator is right across from his office at home. He had a CABG a year ago and underwent Cardiac Rehab. He has Lymphedema postoperatively in the graft leg. He uses a T-Slim pump and the Ivelisse system to manage his BG. They have been running higher. He is also using Trulicity 1.5 mg weekly. He has noticed that when he puts his set on the R side of his abdomen, his numbers run higher. He has tried putting it on his leg, but kept accidentally picking it off.        Blood sugar Log: Ivleisse  2/23  244, 174, 120    2/22  171, 206, 327    2/21  221, 238, 296, 306, 285    2/20  354, 287, 179    2/19  354, 259, 315, 313, 250, 311, 315    2/18  207, 338, 352    2/17  157, 339, 323        Past Medical History     Patient Active Problem List   Diagnosis     Diabetes mellitus (H)     Morbid obesity (H)     Cardiomyopathy (H)     Chronic obstructive pulmonary disease (H)     Hyperlipidemia     Hypertension     Sleep apnea     Insulin pump status     MAHMOOD (dyspnea on exertion)     Abnormal cardiac CT angiography     CAD (coronary artery disease)     S/P CABG (coronary artery bypass graft)     ARF (acute respiratory  failure) (H)     Anemia due to blood loss, acute     Postoperative pain     Arteriosclerosis of coronary artery bypass graft     Body mass index (BMI) 50.0-59.9, adult     Pure hypertriglyceridemia     Tobacco user     Peripheral venous insufficiency        Family History       family history includes Colon cancer in his sister; Heart failure in his mother; Leukemia in his father and mother.    Social History      reports that he has quit smoking. He has never used smokeless tobacco. He reports current alcohol use. He reports that he does not use drugs.      Review of Systems     Patient has no polyuria or polydipsia, no chest pain, dyspnea or TIA's, no numbness, tingling or pain in extremities  Remainder negative except as noted in HPI.    Vital Signs     There were no vitals taken for this visit.  Wt Readings from Last 3 Encounters:   09/17/20 (!) 345 lb (156.5 kg)   06/22/20 (!) 339 lb 11.2 oz (154.1 kg)   06/17/20 (!) 341 lb 12.8 oz (155 kg)       Physical Exam     Constitutional:  Well developed, Well nourished  HENT:  Normocephalic,   Neck: Thyroid normal, No lymph nodes, Supple  Eyes:  PERRL, Conjunctiva pink  Respiratory:   No respiratory distress, normal chest wall excursions  Skin: No acanthosis nigricans,  Neurologic:  Alert & oriented x 3, nonfocal  Psychiatric:  Affect, Mood, Insight appropriate      Assessment     1. Type 2 diabetes mellitus with other circulatory complication, with long-term current use of insulin (H)        Plan     Will increase his Trulicity to 3 mg weekly. He is going to work on keep his sets in areas of less use as they are more effective. We will f/u in 6 months. Alvino is going to let me know how the Trulicity is working with the increased dose.       Emily SOLIS Endocrinology  2/25/2021  6:21 AM        Lab Results     Hemoglobin A1c   Date Value Ref Range Status   10/02/2020 8.9 (H) <=5.6 % Final     Comment:     Normal <5.7% Prediabete 5.7-6.4% Diabletes 6.5% or higher  - adopted from ADA consensus guidelines   03/27/2020 6.1 (H) 3.5 - 6.0 % Final   02/25/2020 6.7 (H) 4.2 - 6.1 % Final   03/28/2019 6.8 (H) 3.5 - 6.0 % Final   09/24/2018 7.0 (H) 3.5 - 6.0 % Final     Creatinine   Date Value Ref Range Status   02/19/2021 1.11 0.70 - 1.30 mg/dL Final   10/02/2020 1.09 0.70 - 1.30 mg/dL Final   03/03/2020 1.04 0.70 - 1.30 mg/dL Final     Microalbumin, Random Urine   Date Value Ref Range Status   10/02/2020 17.87 (H) 0.00 - 1.99 mg/dL Final       Cholesterol   Date Value Ref Range Status   02/19/2021 123 <=199 mg/dL Final     HDL Cholesterol   Date Value Ref Range Status   02/19/2021 33 (L) >=40 mg/dL Final     LDL Calculated   Date Value Ref Range Status   02/19/2021 23 <=129 mg/dL Final     Triglycerides   Date Value Ref Range Status   02/19/2021 334 (H) <=149 mg/dL Final       Lab Results   Component Value Date    ALT 68 (H) 02/19/2021    AST 43 (H) 02/19/2021    ALKPHOS 134 (H) 02/19/2021    BILITOT 1.1 (H) 02/19/2021         Current Medications     Outpatient Medications Prior to Visit   Medication Sig Dispense Refill     acetaminophen (TYLENOL) 325 MG tablet Take 2 tablets (650 mg total) by mouth every 4 (four) hours as needed.  0     ADVAIR DISKUS 250-50 mcg/dose DISKUS Inhale 1 puff 2 (two) times a day.       aspirin 81 MG EC tablet Take 81 mg by mouth daily.       atorvastatin (LIPITOR) 80 MG tablet TAKE 1 TABLET BY MOUTH DAILY 90 tablet 1     COMBIVENT RESPIMAT  mcg/actuation Mist inhaler INHALE 1 PUFF BY MOUTH 4 TIMES A DAY AS NEEDED  3     dulaglutide (TRULICITY) 1.5 mg/0.5 mL PnIj Inject 0.5 mL under the skin once a week. 12 Syringe 3     flash glucose scanning reader (PubGame ANA M 14 DAY READER) Misc Use 1 each As Directed every 14 (fourteen) days. 2 each 3     flash glucose sensor (FREESTYLE ANA M 14 DAY SENSOR) Kit Use 1 each As Directed every 14 (fourteen) days. 6 kit 1     furosemide (LASIX) 40 MG tablet Take 1 tablet by mouth 2 (two) times a day.         insulin aspart U-100 (NOVOLOG U-100 INSULIN ASPART) 100 unit/mL injection Inject 375 units of insulin daily via the pump 340 mL 1     insulin pump cartridge Crtg Inject 4 Units/hr under the skin continuous. Bolus average 15-20 units before meals (depends on meal)   1:20 insulin to carb ratio  1 unit insulin to lower glucose by 5mg/dl 1 Cartridge 0     insulin pump cartridge Inject under the skin continuous. Insulin pump discharge instructions from 03/03/20.  This is intended as additional information to the patient's PTA insulin pump order.  Continue with insulin pump at present settings. 1 each 0     lisinopriL (PRINIVIL,ZESTRIL) 20 MG tablet Take 1 tablet (20 mg total) by mouth 2 (two) times a day. 180 tablet 3     metoprolol tartrate (LOPRESSOR) 100 MG tablet TAKE 1 TABLET BY MOUTH TWICE A  tablet 2     multivitamin (ONE A DAY) per tablet Take 1 tablet by mouth daily.       nitroglycerin (NITROSTAT) 0.4 MG SL tablet Place 1 tablet (0.4 mg total) under the tongue every 5 (five) minutes as needed for chest pain (chest discomfort). Take 1 tablet sublingual for chest symptoms and allow to dissolve under tongue without swallowing.  If symptoms do not resolve in 5 minutes, repeat dose and contact EMS by calling 911.  Continue to repeat dose sublingual every 5 minutes for total 4 doses. 1 Bottle 0     omeprazole (PRILOSEC) 20 MG capsule Take 20 mg by mouth daily.       triamcinolone (KENALOG) 0.025 % cream Apply 1 application topically 3 (three) times a day as needed.        No facility-administered medications prior to visit.              Video-Visit Details    Type of service:  Video Visit    Video End Time (time video stopped): 1400  Originating Location (pt. Location): Home    Distant Location (provider location):  [facility]     Platform used for Video Visit: Trellise

## 2021-06-15 NOTE — PROGRESS NOTES
Alvino is in for a 14-day sensor placement.  He will come in for a download in 7 days to review his data.  Next Friday I will review this data with Christina and we will make insulin dose recommendations for the next 7 days of his study.

## 2021-06-15 NOTE — PROGRESS NOTES
Assessment: Alvino is here today for information on obtaining an insulin pump and/or CGM.  He recently saw Christina Alfred NP who increased his levemir from 60/40 to 70/50 (AM/PM) daily.  He reports his blood sugars have improved from the 300-400s since then.  His blood sugars have been 325, 273, 318 @ morning, 238, 243 @ lunchtime, & 345, 176 before dinner.  He is on a standard dose of 15 units of novolog per meal and increases 3 units every 40/mg/dl his blood sugar is >140.  Due to his high intake of insulin, an omnipod may not be a great choice for Alvino because it only holds 200U of insulin.  This would force him to change his infusion site every day.  He had the highest amount of interest in the T:Flex because it can hold up to 480U of insulin.  An application was submitted today and a representative should be reaching out to him within 2 days.  His second choice would be the Medtronic 630G due to holding up to 300U of insulin.  He was interested in CGMs so we briefly discussed his options today.  Depending on which insulin pump he is covered for will ultimately determine his decision on the CGM as well.  For the Tandem T:flex he would be interested in the Dexcom or the Freestyle Ivelisse & for the 630G he would be interested in the Medtronic Sensor.  We discussed doing a Professional Freestyle Ivelisse trial today and he would like to do that.  We ran out of time at our appointment so he scheduled for later today at 3PM to start on a sensor for 14 days.      Plan: A [employer]ulin [employer] representative will reach out to Alvino within 2 days time to obtain information to process the T:flex through his insurance.  Alvino will be in contact with me & Christina if he would like to move forward with the pump &/or a sensor.  He will return later today to start on a sensor study.  I discussed Alvino's blood sugars with Christina and it was decided to increase his levemir to 70 units AM and 60 units PM to help bring down his morning blood sugars.   I will follow up with Alvino in 1 week to download his glucose sensor data.    Subjective and Objective:      Walt Lambert is referred by Christina Alfred for Diabetes Education.     Lab Results   Component Value Date    HGBA1C 7.8 (H) 06/01/2012           Goals       a1c <8.0           Follow up:   Primary care visit  Endocrinology, in 3 months  CDE (certified diabetic educator)      Education:     Monitoring   Meter (per above goals): Assessed, Discussed and Literature provided  Monitoring: Assessed and Discussed  BG goals: Assessed and Discussed    Nutrition Management  Nutrition Management: Not addressed  Weight: Not addressed  Portions/Balance: Not addressed  Carb ID/Count: Not addressed  Label Reading: Not addressed  Heart Healthy Fats: Not addressed  Menu Planning: Not addressed  Dining Out: Not addressed  Physical Activity: Assessed and Discussed  Medications: Assessed and Discussed  Orals: Not addressed  Injected Medications: Assessed and Discussed   Storage/Exp:Assessed and Discussed   Site Rotation: Assessed and Discussed   Sites Assessed: no    Diabetes Disease Process: Assessed and Discussed    Acute Complications: Prevent, Detect, Treat:  Hypoglycemia: Assessed and Discussed  Hyperglycemia: Assessed and Discussed  Sick Days: Not addressed  Driving: Assessed and Discussed    Chronic Complications  Foot Care:Not addressed  Skin Care: Not addressed  Eye: Not addressed  ABC: Not addressed  Teeth:Not addressed  Goal Setting and Problem Solving: Assessed and Discussed  Barriers: Assessed and Discussed  Psychosocial Adjustments: Assessed and Discussed      Time spent with the patient: 60 minutes for diabetes education and counseling.   Previous Education: no  Visit Type:MONIQUE Osorio  1/19/2018           I agree with the aforementioned diabetes plan.  Emily Alfred  [facility] Endocrinology  1/19/2018  3:40 PM

## 2021-06-16 PROBLEM — I87.2 PERIPHERAL VENOUS INSUFFICIENCY: Status: ACTIVE | Noted: 2021-02-25

## 2021-06-16 PROBLEM — Z96.41 INSULIN PUMP STATUS: Status: ACTIVE | Noted: 2019-09-29

## 2021-06-16 PROBLEM — I42.9 CARDIOMYOPATHY (H): Status: ACTIVE | Noted: 2019-03-13

## 2021-06-16 PROBLEM — G47.30 SLEEP APNEA: Status: ACTIVE | Noted: 2019-09-29

## 2021-06-16 PROBLEM — Z72.0 TOBACCO USER: Status: ACTIVE | Noted: 2020-04-06

## 2021-06-16 PROBLEM — D62 ANEMIA DUE TO BLOOD LOSS, ACUTE: Status: ACTIVE | Noted: 2020-02-26

## 2021-06-16 PROBLEM — J96.00 ARF (ACUTE RESPIRATORY FAILURE) (H): Status: ACTIVE | Noted: 2020-02-26

## 2021-06-16 PROBLEM — E11.9 DIABETES MELLITUS (H): Status: ACTIVE | Noted: 2017-12-29

## 2021-06-16 PROBLEM — I10 HYPERTENSION: Status: ACTIVE | Noted: 2019-09-29

## 2021-06-16 PROBLEM — R06.09 DOE (DYSPNEA ON EXERTION): Status: ACTIVE | Noted: 2020-02-10

## 2021-06-16 PROBLEM — Z95.1 S/P CABG (CORONARY ARTERY BYPASS GRAFT): Status: ACTIVE | Noted: 2020-02-26

## 2021-06-16 PROBLEM — R93.1 ABNORMAL CARDIAC CT ANGIOGRAPHY: Status: ACTIVE | Noted: 2020-02-10

## 2021-06-16 PROBLEM — I25.10 CAD (CORONARY ARTERY DISEASE): Status: ACTIVE | Noted: 2020-02-21

## 2021-06-16 PROBLEM — E66.01 MORBID OBESITY (H): Status: ACTIVE | Noted: 2018-09-28

## 2021-06-16 PROBLEM — I25.810 ARTERIOSCLEROSIS OF CORONARY ARTERY BYPASS GRAFT: Status: ACTIVE | Noted: 2020-04-06

## 2021-06-16 PROBLEM — G89.18 POSTOPERATIVE PAIN: Status: ACTIVE | Noted: 2020-02-26

## 2021-06-16 PROBLEM — E78.5 HYPERLIPIDEMIA: Status: ACTIVE | Noted: 2019-09-29

## 2021-06-16 PROBLEM — E78.1 PURE HYPERTRIGLYCERIDEMIA: Status: ACTIVE | Noted: 2020-04-06

## 2021-06-16 PROBLEM — J44.9 CHRONIC OBSTRUCTIVE PULMONARY DISEASE (H): Status: ACTIVE | Noted: 2019-09-29

## 2021-06-16 NOTE — PROGRESS NOTES
Assessment: Alvino is here for an insulin pump start class with [employer] CDE  [facility] Today.  He is getting started on the T:Flex insulin pump which holds 480U of insulin.  His blood sugar this morning was 241.  Start orders were reviewed with Provider Emily Alfred NP and Alvino's insulin pump settings are as follows:  Basal rate - 4.8u/hr  ISF - 1:15mg/dl  ICR (meal size as patient does not count carbs) - Large: 20 units, Medium: 15 units, Small: 10 units, Snack: 5 units.  Target BG - 125  Active insulin time - 4 hours  Auto off - 14 hours  Low insulin alert - 60 units    Alvino was well prepared and read up on the material before our appointment.  He moved quickly and confidently through the insulin cartridge fill & site insertion.  He will change his site every 2-3 days & bolus before every meal.  We discussed choosing a site that does not have scar tissue, is not bruised, or reddened.  His right abdomen has some in flammed tissue so we suggested staying away from that site to allow it to heal.  He is on a temp basal of 50% for the next 10 hours because he took his levemir last night, but not this morning.      Alvino also received his Ivelisse Freestyle CGM and is ready to start on that today.  He demonstrated proper insertion and start of his glucose sensor.  It will warm up for the next 12 hours and then give him a glucose reading anytime he waves the reader over the sensor.  We will use this data to appropriately titrate his insulin pump doses in the future.  As he trusts the accuracy, he will be able to enter this reading into his insulin pump for correction.    Plan: Alvino will continue to check his blood sugars 3-4x/daily to compare the accuracy of his Freestyle Ivelisse sensor.  He will put those blood sugars into his insulin pump so it gives him an accurate amount of insulin to correct highs.  He will enter in his insulin based on his meal size instead of counting carbohydrates.  Alvino will replace his infusion  site every 3 days and his Ivelisse sensor every 10 days.  He will call me with any questions in the meantime.  His follow up with me and [facility] in 2 weeks to get him set up with uploading it to our system automatically.      Subjective and Objective:      Walt Lambert is referred by Emily Alfred for Diabetes Education.     Lab Results   Component Value Date    HGBA1C 7.8 (H) 06/01/2012         Current diabetes medications:  novolog for pump    Goals       a1c <8.0       Nutrition            Reduce carbs & sweets throughout the day to lower blood sugars and need for insulin.            Follow up:   Primary care visit  Endocrinology, in 2 months  CDE (certified diabetic educator)      Education:     Monitoring   Meter (per above goals): Assessed and Discussed  Monitoring: Assessed and Discussed  BG goals: Assessed and Discussed    Nutrition Management  Portions/Balance: Assessed and Discussed  Carb ID/Count: Assessed and Discussed  Injected Medications: Assessed and Discussed   Storage/Exp:Assessed and Discussed   Site Rotation: Assessed and Discussed   Sites Assessed: yes    Diabetes Disease Process: Assessed and Discussed    Acute Complications: Prevent, Detect, Treat:  Hypoglycemia: Assessed and Discussed  Hyperglycemia: Assessed and Discussed    Chronic Complications  Goal Setting and Problem Solving: Assessed and Discussed  Barriers: Assessed and Discussed  Psychosocial Adjustments: Assessed and Discussed      Time spent with the patient: 120 minutes for diabetes education and counseling.   Previous Education: yes  Visit Type:DSMT  Insulin Pump & CGM Training  Hours Remaining:       Rodney Osorio  2/12/2018      I agree with the aforementioned diabetes plan.  Emily Alfred  [facility] Endocrinology  2/12/2018  10:20 AM

## 2021-06-16 NOTE — PROGRESS NOTES
Assessment: Alvino is here for a follow up on diabetes management.  His pump and CGM data was downloaded and reviewed today.  He is averaging 221 units/daily - 123 units are from his basal rate, 74 units are for food, & 24 are for correction.  He is scanning his Ivelisse CGM 10x/day & his average has come down to 188mg/dl for the past 7 days (from 240).  His time in range (from ) has improved from 20% to 45% over the past 2 weeks.  We are aiming for 60%+ at our next visit.  His blood sugars increase slightly from 6:30am-11am and from 5pm-10pm (>200).  We discussed dosing for his meal 30 minutes prior to eliminate these spikes.  He will try to do this.  We went over safe titration of insulin while [place] is [place] if his activity or diet changes.  Today was a great example as he missed eating lunch today and had a blood sugar that was slowly dropping.  He was at 117 so we discussed using the temp basal mode if this were to occur again to prevent a low.  He will reduce his basal to 50% if he is going to skip a meal and monitor his blood sugar often through his Ivelisse sensor.  Alvino has gained 4lbs since his last weigh in and over-eating has been an issue for him.  To help with weight loss and reducing his infusion site change time from every 2 to every third day, we discussed starting on a GLP-1 like Trulicity.  Alvino is interested in starting this to help curb his appetite and reduce his need for insulin.  He has seen how the pump has reduced his need for insulin by 50 units and is hoping Trulicity might be able to do the same.  Patient denies hx of pancreatitis and family hx of medullary thyroid cancer.  This is a contraindication for any GLP-1.  I encouraged him to eat less per meal to reduce side effects of nausea.  I suggested decreasing his basal rate by 0.5u/hr and meals by 5 units once starting on the Trulicity.        Plan: Until Alvino starts on the Trulicity.  I suggest increasing his Basal insulin dose from 5.3 to  5.5u/hr.  Once he starts the Trulicity, I recommend reducing his basal to 5.0u/hr and his meals by 5 units.  He will continue to scan his Ivelisse CGM often to prevent lows and highs.  He will utilize the temp basal mode for when he skips meals or walks more while in [place].  He will follow up with Christina Pendleton I in early May.  Hopefully the Trulicity will be able to tame his appetite and help him shed a few pounds to also reduce his need for insulin.  He is utilizing 220-240u/daily so he needed a new Rx was sent in for 45 insulin pens every 90 days.    Subjective and Objective:      Walt Lambert is referred by Emily Alfred for Diabetes Education.     Lab Results   Component Value Date    HGBA1C 7.8 (H) 06/01/2012         Current diabetes medications:  novolog for insulin pump    Goals       a1c <8.0       Nutrition            Reduce carbs & sweets throughout the day to lower blood sugars and need for insulin.            Follow up:   Endocrinology, in 2 months  CDE (certified diabetic educator)      Education:     Monitoring   Meter (per above goals): Assessed and Discussed  Monitoring: Assessed and Discussed  BG goals: Assessed and Discussed    Nutrition Management  Nutrition Management: Assessed and Discussed  Weight: Assessed and Discussed  Portions/Balance: Assessed and Discussed  Carb ID/Count: Assessed and Discussed  Label Reading: Not addressed  Heart Healthy Fats: Not addressed  Menu Planning: Assessed and Discussed  Dining Out: Not addressed  Physical Activity: Assessed and Discussed  Medications: Assessed and Discussed  Orals: Assessed and Discussed  Injected Medications: Assessed and Discussed   Storage/Exp:Assessed and Discussed   Site Rotation: Assessed and Discussed   Sites Assessed: yes    Diabetes Disease Process: Assessed and Discussed    Acute Complications: Prevent, Detect, Treat:  Hypoglycemia: Assessed and Discussed  Hyperglycemia: Assessed and Discussed  Sick Days: Assessed and  Discussed  Driving: Not addressed    Chronic Complications  Foot Care:Assessed and Discussed  Skin Care: Not addressed  Eye: Assessed and Discussed  ABC: Assessed and Discussed  Teeth:Not addressed  Goal Setting and Problem Solving: Assessed and Discussed  Barriers: Assessed and Discussed  Psychosocial Adjustments: Assessed and Discussed      Time spent with the patient: 90 minutes for diabetes education and counseling.   Previous Education: yes  Visit Type:MONIQUE Osorio  3/12/2018         I agree with the aforementioned diabetes plan.  Emily LARES [facility] Endocrinology  3/13/2018  6:28 AM

## 2021-06-16 NOTE — TELEPHONE ENCOUNTER
Telephone Encounter by Alva Anaya at 2/26/2019  3:57 PM     Author: Alva Anaya Service: -- Author Type: --    Filed: 2/26/2019  3:59 PM Encounter Date: 2/22/2019 Status: Signed    : Alva Anaya APPROVED:    Approval start date:01/27/2019  Approval end date:02/25/2022    Pharmacy has been notified of approval and will contact patient when medication is ready for pickup.

## 2021-06-16 NOTE — PROGRESS NOTES
Alvino has a T:Connect account for his insulin pump.  Username: [email or web address]  Password: [email or web address]  Serial number for his pump is [id number].

## 2021-06-16 NOTE — PROGRESS NOTES
Assessment: Alvino is here for a follow up on his T:Flex insulin pump and Ivelisse Freestyle Personal CGM.  His Ivelisse gave a blood sugar range of 170-320 with an average of 240.  When Alvino was shoveling a couple days last week his blood sugar was in the 100s more often.  He has been doing 13 scans per day to check his blood sugar with the reader.  Last night his blood sugar dropped down into the  range in to this morning.  This was the lowest blood sugar he has had in years.  This was due to taking 25 units for Calvin last night which did not have much carbs in comparison to his normal meal.  Alvino did misunderstand our discussion last appointment on insulin dosages for meals.  Fortunately it was for the better and his blood sugars were still high.  He understood that a large meal was 25 units, medium - 20 units, small - 15 units, & snack - 10 units instead of 5 units less per each.  We will continue that scale since he does not carb count at this time.  He also states he has been adjusting for more insulin when he has high blood sugars instead of letting the pump do that for him.  To help bring his blood sugars down a bit more his basal was adjusted from 4.8 to 5.3u/hr and ISF was changed from 1:15mg/dl to 1:12mg/dl.  I encouraged him to trust the insulin pumps recommendations on insulin dosages and to call if he remains high to adjust the settings further.  This change was discussed with Christina Alfred prior.  Alvino's T:Connect account can be accessed by his e-mail [email or web address] & password [email or web address] .      Plan: Alvino will continue to change his infusion site every 2-3 days and scan his blood sugar often on his Freestyle Ivelisse Sensor.  He will call if his blood sugars remain high for further adjustment.  We will follow up in 2 weeks to review blood sugars and make further adjustments.  Alvino will be heading to [place] in April so we will discuss safe basal titration for exercise, diet, & lifestyle changes.  He will  follow up with Christina Alfred in 2 months.      Subjective and Objective:      Walt Lambert is referred by Emily Alfred for Diabetes Education.     Lab Results   Component Value Date    HGBA1C 7.8 (H) 06/01/2012         Current diabetes medications:  Novolog for insulin pump, metformin 2000mg    Goals       a1c <8.0       Nutrition            Reduce carbs & sweets throughout the day to lower blood sugars and need for insulin.            Follow up:   Primary care visit  CDE (certified diabetic educator)      Education:     Monitoring   Meter (per above goals): Assessed and Discussed  Monitoring: Assessed and Discussed  BG goals: Assessed and Discussed    Nutrition Management  Nutrition Management: Assessed and Discussed  Weight: Assessed and Discussed  Portions/Balance: Assessed and Discussed  Carb ID/Count: Assessed and Discussed   Label Reading: Not addressed  Heart Healthy Fats: Not addressed  Menu Planning: Not addressed  Dining Out: Not addressed  Physical Activity: Assessed and Discussed  Medications: Assessed and Discussed  Orals: Assessed and Discussed  Injected Medications: Assessed and Discussed   Storage/Exp:Assessed and Discussed   Site Rotation: Assessed and Discussed   Sites Assessed: no    Diabetes Disease Process: Assessed and Discussed    Acute Complications: Prevent, Detect, Treat:  Hypoglycemia: Assessed and Discussed  Hyperglycemia: Assessed and Discussed  Sick Days: Not addressed  Driving: Not addressed    Chronic Complications  Goal Setting and Problem Solving: Assessed and Discussed  Barriers: Assessed and Discussed  Psychosocial Adjustments: Assessed and Discussed      Time spent with the patient: 60 minutes for diabetes education and counseling.   Previous Education: yes  Visit Type:MONIQUE Osorio  2/26/2018        I agree with the aforementioned diabetes plan.  Emily Alfred  [facility]  2/27/2018  6:32 AM

## 2021-06-16 NOTE — TELEPHONE ENCOUNTER
Patient called. He will need a new prescription for Trulicity sent to his pharmacy.   Per patient increased the units as directed and used up what he had at home. He is now out of this medication and will need a refill.    [facility] on file    Alvino @ [phone]

## 2021-06-16 NOTE — TELEPHONE ENCOUNTER
Telephone Encounter by Alva Anaya at 2/26/2019  3:14 PM     Author: Alva Anaya Service: -- Author Type: --    Filed: 2/26/2019  3:15 PM Encounter Date: 2/22/2019 Status: Signed    : Alva Anaya PA team  [phone]    PA has been initiated.

## 2021-06-16 NOTE — TELEPHONE ENCOUNTER
Message received from pharmacy regarding Trulicity. Requesting 3 mg/ 0.5 ML pens. Current prescription patient has to inject 2 pens. Order pended.  Marni Maynard CMA ............... 4:19 PM, 04/02/21

## 2021-06-18 ENCOUNTER — AMBULATORY - HEALTHEAST (OUTPATIENT)
Dept: LAB | Facility: CLINIC | Age: 71
End: 2021-06-18

## 2021-06-18 DIAGNOSIS — Z79.4 TYPE 2 DIABETES MELLITUS WITH OTHER CIRCULATORY COMPLICATION, WITH LONG-TERM CURRENT USE OF INSULIN (H): ICD-10-CM

## 2021-06-18 DIAGNOSIS — E11.59 TYPE 2 DIABETES MELLITUS WITH OTHER CIRCULATORY COMPLICATION, WITH LONG-TERM CURRENT USE OF INSULIN (H): ICD-10-CM

## 2021-06-18 LAB — HBA1C MFR BLD: 9 %

## 2021-06-18 NOTE — PROGRESS NOTES
Assessment: Alvino is here for a follow up on diabetes management.  His a1c improved to his best since his diagnosis of type 2 diabetes he reports - 7.0%.  He makes it easy for me today and brings all his insulin pump and Ivelisse CGM data with him.  As he predicted, his blood sugars have increased after returning to the [place].  They average 150-180 on average with a Target in range of 65% from his previous of 95%.  He is taking 210-230 units of insulin daily via his pump and trulicity 1.5mg.  He reports that the Trulicity sometimes acts like novolog and keeps his blood sugars much lower at night.  Alvino can feel high blood sugars because his feet start to hurt.  Although his a1c was good, he comes in today inquiring about his microalbuminuria lab which was high.  We discussed how this means his kidneys are showing some damage by filtering proteins which are normally too big for the kidneys.  His liver enzymes also remain high & show liver dysfunction.  We discussed how his diet needs to change if he wants to keep his feet and organs healthy for many years to come.      He was given two books to read - [place] [employer] [employer] [place] and The Diabetes Code to help his pursuit for health.  He was encouraged to eliminate processed oils such as canola and include more cold pressed oils like extra virgin olive oil.  He was shown how to substitute out pasta, rice, & potatoes for zucchini/edamame noodles and cauliflower rice.  He will stick mostly to the berries when it comes to the fruit and start to eat more organic proteins.  He will choose more one ingredient items and reduce intake of high fructose corn syrup.  He is ready to make this change as he can feel the circulation in his feet getting worse.  Alvino knows how to change his basal doses in case his diet does radically change & demand less insulin.  He will use his CGM often when making these lifestyle changes.    Plan: Alvino will continue on trulicity and switch to vials of  insulin for his Tandem Flex Insulin Pump.  He will read books to give him suggestions on low carb diets and recipes.  He will eat more whole foods and reduce the processed foods.  He is ready to start reducing his average daily insulin dose from 230 units which prevents him from losing weight.  He will follow up with Christina Alfred NP every 3-4 months.      Subjective and Objective:      Walt Lambert is referred by Christina Alfred for Diabetes Education.     Lab Results   Component Value Date    HGBA1C 7.0 (H) 05/16/2018         Current diabetes medications:  Insulin for pump, trulicity 1.5mg    Goals       a1c <8.0       Nutrition            Reduce carbs & sweets throughout the day to lower blood sugars and need for insulin.  6/8/18:  Not met, highly encouraged the reduce of carbohydrates therefore needing less insulin.              Follow up:   Endocrinology, in 3 months      Education:     Monitoring   Meter (per above goals): Assessed and Discussed  Monitoring: Assessed and Discussed  BG goals: Assessed and Discussed    Nutrition Management  Nutrition Management: Assessed and Discussed  Weight: Assessed and Discussed  Portions/Balance: Assessed and Discussed  Carb ID/Count: Assessed and Discussed  Label Reading: Assessed and Discussed  Heart Healthy Fats: Assessed and Discussed  Menu Planning: Assessed and Discussed  Dining Out: Assessed and Discussed  Physical Activity: Assessed and Discussed  Medications: Assessed and Discussed  Orals: Assessed and Discussed  Injected Medications: Assessed and Discussed   Storage/Exp:Assessed and Discussed   Site Rotation: Assessed and Discussed   Sites Assessed: yes    Diabetes Disease Process: Assessed and Discussed    Acute Complications: Prevent, Detect, Treat:  Hypoglycemia: Assessed and Discussed  Hyperglycemia: Assessed and Discussed  Sick Days: Not addressed  Driving: Not addressed    Chronic Complications  Foot Care:Assessed and Discussed  Skin Care: Assessed and  Discussed  Eye: Assessed and Discussed  ABC: Assessed and Discussed  Teeth:Assessed and Discussed  Goal Setting and Problem Solving: Assessed and Discussed  Barriers: Assessed and Discussed  Psychosocial Adjustments: Assessed and Discussed      Time spent with the patient: 60 minutes for diabetes education and counseling.   Previous Education: yes  Visit Type:MONIQUE Osorio  6/8/2018

## 2021-06-18 NOTE — PATIENT INSTRUCTIONS - HE
Patient Instructions by Mitch Duncan MD at 4/20/2020 10:10 AM     Author: Mitch Duncan MD Service: -- Author Type: Physician    Filed: 4/20/2020 10:36 AM Encounter Date: 4/20/2020 Status: Signed    : Mitch Duncan MD (Physician)       It was a pleasure to speak with you on 4/20/2020.    Below is a summary of our conversation:   1. Continue your medications as prescribed  2. Increase your exercise to doing something at least daily. Start with a walk every day. The more physical activity you do, the better you will feel and the healthier you will be. Exercise will also help you recover faster from your surgery. Make sure you adhere to the recommendations of the surgeons, however, as it relates to your sternal precautions.  3. Follow up with me in 6 months or sooner if needed.     Please do not hesitate to call the [facility] clinic with any questions or concerns at [phone].    Sincerely,

## 2021-06-18 NOTE — PATIENT INSTRUCTIONS - HE
Patient Instructions by Mitch Duncan MD at 9/17/2020  3:10 PM     Author: Mitch Duncan MD Service: -- Author Type: Physician    Filed: 9/17/2020  3:35 PM Encounter Date: 9/17/2020 Status: Addendum    : Mitch Duncan MD (Physician)    Related Notes: Original Note by Mitch Duncan MD (Physician) filed at 9/17/2020  3:33 PM       It was a pleasure to meet with you today.      Below is a summary of your visit.   1. Take your atorvastatin in the evening before bedtime.  2. Increase your lisinopril to 20 mg twice daily  3. I advise regular exercise. The more you do, the better.  4. Follow up with me again in March, 2021    Exercise:  Recommendation: Regularly exercise  -Goal for 30 to 60 minutes of moderate-intensity aerobic activity, 7 days per week (minimum 5 days per week)  -Start at a duration and intensity of exercise that is comfortable for you.  -Increase by 5 minutes every other week until goal is reached  -Eventually, the intensity of exercise should be at a level at which it is difficult to carry on a normal conversation      Please do not hesitate to call the [facility] clinic with any questions or concerns at [phone].    Sincerely,

## 2021-06-18 NOTE — PROGRESS NOTES
[facility]  ENDOCRINOLOGY    Diabetes Note 5/26/2018    Walt Lambert, 1950, [id number]          Reason for visit      1. Diabetes mellitus        HPI     Walt Lambert is a very pleasant 67 y.o. old male who presents for follow up.  SUMMARY:  Alvino returns today in f/u for DM 2.  His A1c has dropped to 7 from 7.8.  He states that it is because he was just in [place].  He does a lot of walking while there, as well as eating differently.  He has been using a T-slim pump and a Ivelisse CGM since his last appointment. He states that mostly life is better. We are unable to download his pump for lack of software.  His Ivelisse download shows an ave BG of 127.  92% of the time over the last 2 weeks, he was within range ().  He has not had any hypoglycemia. He states that he has been watching how much insulin he already has on board, and this has been helpful.           Blood glucose data:  Ave: 127, SD: 32.4    Past Medical History     Patient Active Problem List   Diagnosis     Diabetes mellitus        Family History       family history is not on file.    Social History      reports that he has never smoked. He has never used smokeless tobacco. He reports that he drinks alcohol. He reports that he does not use illicit drugs.      Review of Systems     Patient has no polyuria or polydipsia, no chest pain, dyspnea or TIA's, no numbness, tingling or pain in extremities  Remainder negative except as noted in HPI.    Vital Signs     /80 (Patient Site: Right Arm, Patient Position: Sitting, Cuff Size: Adult Large)  Pulse 80  Wt (!) 331 lb 4.8 oz (150.3 kg)  Wt Readings from Last 3 Encounters:   05/23/18 (!) 331 lb 4.8 oz (150.3 kg)   03/12/18 (!) 332 lb (150.6 kg)   01/19/18 (!) 328 lb (148.8 kg)       Physical Exam     Constitutional:  Well developed, Well nourished  HENT:  Normocephalic,   Neck: Thyroid normal, No lymph nodes, Supple  Eyes:  PERRL, Conjunctiva pink  Respiratory:  Normal breath sounds, No  respiratory distress  Cardiovascular:  Normal heart rate, Normal rhythm, No murmurs  GI:  Bowel sounds normal, Soft, No tenderness  Musculoskeletal:  No gross deformity or lesions, normal dorsalis pedis pulses  Skin: No acanthosis nigricans, lipoatrophy or lipodystrophy  Neurologic:  Alert & oriented x 3, nonfocal  Psychiatric:  Affect, Mood, Insight appropriate  Diabetic foot exam: no ulcers, charcot's or high risk calluses, Normal monofilament exam        Assessment     1. Diabetes mellitus        Plan     Alvino states that his A1c will likely go up 2/2 his dietary changes now that [place].  He is doing a great job of it, nonetheless.  No changes necessary to his pump settings today.  I will see him back in 3 months.  Time spent with pt today: 25 min with >50% spent in counseling and coordination of care.        [facility] Endocrin[facility]  5/26/2018  2:02 PM        Lab Results     Hemoglobin A1c   Date Value Ref Range Status   05/16/2018 7.0 (H) 3.5 - 6.0 % Final   06/01/2012 7.8 (H) 4.2 - 6.1 % Final     Creatinine   Date Value Ref Range Status   04/03/2018 0.93 0.70 - 1.30 mg/dL Final   02/20/2017 1.14 0.70 - 1.30 mg/dL Final   12/30/2015 1.03 0.70 - 1.30 mg/dL Final     Microalbumin, Random Urine   Date Value Ref Range Status   05/16/2018 46.72 (H) 0.00 - 1.99 mg/dL Final       Cholesterol   Date Value Ref Range Status   04/03/2018 149 <=199 mg/dL Final     HDL Cholesterol   Date Value Ref Range Status   04/03/2018 38 (L) >=40 mg/dL Final     LDL Calculated   Date Value Ref Range Status   04/03/2018 63 <=129 mg/dL Final     Triglycerides   Date Value Ref Range Status   04/03/2018 242 (H) <=149 mg/dL Final       Lab Results   Component Value Date    ALT 95 (H) 04/03/2018    AST 51 (H) 04/03/2018    ALKPHOS 116 04/03/2018    BILITOT 1.2 (H) 04/03/2018         Current Medications     Outpatient Medications Prior to Visit   Medication Sig Dispense Refill     aspirin 81 MG EC tablet Take 81 mg  by mouth daily.       budesonide-formoterol (SYMBICORT) 160-4.5 mcg/actuation inhaler Inhale 2 puffs 2 (two) times a day.       COMBIVENT RESPIMAT  mcg/actuation Mist inhaler INHALE 1 PUFF 4 TIMES A DAY AS NEEDED  3     dulaglutide (TRULICITY) 1.5 mg/0.5 mL PnIj Inject 1.5 mg under the skin once a week. 6 mL 2     FREESTYLE ANA M READER Misc USE 1 EACH AS DIRECTED ONCE FOR 1 DOSE.  0     FREESTYLE ANA M SENSOR Kit USE 3 EACH AS DIRECTED EVERY 30 (THIRTY) DAYS. 3 kit 3     insulin aspart U-100 (NOVOLOG FLEXPEN U-100 INSULIN) 100 unit/mL injection pen Take 30 units in the morning, 30 units at noon, and 40 units in the evening (Patient taking differently: Use daily in insulin pump; up to 100 units daily) 90 mL 0     lisinopril (PRINIVIL,ZESTRIL) 10 MG tablet Take 10 mg by mouth daily.       multivitamin (ONE A DAY) per tablet Take 1 tablet by mouth daily.       omeprazole (PRILOSEC) 20 MG capsule Take 20 mg by mouth daily.       pioglitazone-metformin (ACTOPLUS MET XR) 15-1,000 mg TM24 Take 1 tablet by mouth 2 (two) times a day.       simvastatin (ZOCOR) 20 MG tablet Take 20 mg by mouth bedtime.       triamcinolone (KENALOG) 0.025 % cream Apply topically 3 (three) times a day.       metFORMIN (GLUCOPHAGE) 1000 MG tablet TAKE 1 TABLET TWICE A DAY WITH FOOD  1     No facility-administered medications prior to visit.

## 2021-06-20 NOTE — PROGRESS NOTES
"[facility]  ENDOCRINOLOGY    Diabetes Note 9/28/2018    Walt Lambert, 1950, [id number]          Reason for visit      1. Diabetes mellitus (H)    2. Obesity        HPI     Walt Lambert is a very pleasant 67 y.o. old male who presents for follow up.  SUMMARY:  Alvino returns today in f/u for DM 2.  His current A1c is 7.0, which is the same that it was the last time it was measured.  He is currently using the Freestyle Ivelisse and a T-Slim Insulin pump. We are unable to download the T-Slim, but the Freestyle download shows an ave BG fo 109 over the last two weeks.  He tells me that he needs to stay in [place], because when there, he eats differently and walks a lot, and it drastically affects his BG.  He has had some hypoglycemia, which he feels had to due to movement and food choices at the time.  His Dexcom download shows an ave BG of 109 over the last two weeks.  He spent 84% of the time in range. Based on the information captured by his Dexcom, it predicted an A1c of 5.4.     He has put on about 6 lbs since his last appointment. He has no interest at present, in seeking assistance with weight loss.    Past Medical History     Patient Active Problem List   Diagnosis     Diabetes mellitus (H)     Obesity        Family History       family history is not on file.    Social History      reports that he has never smoked. He has never used smokeless tobacco. He reports that he drinks alcohol. He reports that he does not use illicit drugs.      Review of Systems     Patient has no polyuria or polydipsia, no chest pain, dyspnea or TIA's, no numbness, tingling or pain in extremities  Remainder negative except as noted in HPI.    Vital Signs     /70  Ht 5' 10\" (1.778 m)  Wt (!) 337 lb 6.4 oz (153 kg)  BMI 48.41 kg/m2  Wt Readings from Last 3 Encounters:   09/27/18 (!) 337 lb 6.4 oz (153 kg)   06/08/18 (!) 331 lb (150.1 kg)   05/23/18 (!) 331 lb 4.8 oz (150.3 kg)       Physical Exam     Constitutional:  Well " developed, Well nourished  HENT:  Normocephalic,   Neck: Thyroid normal, No lymph nodes, Supple  Eyes:  PERRL, Conjunctiva pink  Respiratory:  Normal breath sounds, No respiratory distress  Cardiovascular:  Normal heart rate, Normal rhythm, No murmurs  GI:  Bowel sounds normal, Soft, No tenderness  Musculoskeletal:  No gross deformity or lesions, normal dorsalis pedis pulses  Skin: No acanthosis nigricans, lipoatrophy or lipodystrophy  Neurologic:  Alert & oriented x 3, nonfocal  Psychiatric:  Affect, Mood, Insight appropriate  Diabetic foot exam: no ulcers, charcot's or high risk calluses, Normal monofilament exam        Assessment     1. Diabetes mellitus (H)    2. Obesity        Plan     Alvino is grossly stable at present, and doing a really good job of staying at goal A1c of 7.  No changes to settings warranted.  He will f/u with me in 6 months.  Refills provided today.  Time spent with pt today: 25 min with >50% spent in counseling and coordination of care.        Emily [facility] Endocrinology  9/28/2018  12:09 PM        Lab Results     Hemoglobin A1c   Date Value Ref Range Status   09/24/2018 7.0 (H) 3.5 - 6.0 % Final   05/16/2018 7.0 (H) 3.5 - 6.0 % Final   06/01/2012 7.8 (H) 4.2 - 6.1 % Final     Creatinine   Date Value Ref Range Status   04/03/2018 0.93 0.70 - 1.30 mg/dL Final   02/20/2017 1.14 0.70 - 1.30 mg/dL Final   12/30/2015 1.03 0.70 - 1.30 mg/dL Final     Microalbumin, Random Urine   Date Value Ref Range Status   05/16/2018 46.72 (H) 0.00 - 1.99 mg/dL Final       Cholesterol   Date Value Ref Range Status   04/03/2018 149 <=199 mg/dL Final     HDL Cholesterol   Date Value Ref Range Status   04/03/2018 38 (L) >=40 mg/dL Final     LDL Calculated   Date Value Ref Range Status   04/03/2018 63 <=129 mg/dL Final     Triglycerides   Date Value Ref Range Status   04/03/2018 242 (H) <=149 mg/dL Final       Lab Results   Component Value Date    ALT 95 (H) 04/03/2018    AST 51 (H) 04/03/2018    ALKPHOS 116  04/03/2018    BILITOT 1.2 (H) 04/03/2018         Current Medications     Outpatient Medications Prior to Visit   Medication Sig Dispense Refill     aspirin 81 MG EC tablet Take 81 mg by mouth daily.       COMBIVENT RESPIMAT  mcg/actuation Mist inhaler INHALE 1 PUFF 4 TIMES A DAY AS NEEDED  3     dulaglutide (TRULICITY) 1.5 mg/0.5 mL PnIj Inject 1.5 mg under the skin once a week. 6 mL 2     FREESTYLE ANA M READER Misc USE 1 EACH AS DIRECTED ONCE FOR 1 DOSE.  0     FREESTYLE ANA M SENSOR Kit USE 3 EACH AS DIRECTED EVERY 30 (THIRTY) DAYS. 3 kit 3     insulin aspart U-100 (NOVOLOG U-100 INSULIN ASPART) 100 unit/mL injection Inject 220-230 Units under the skin daily. For insulin pump. 210 mL 1     lisinopril (PRINIVIL,ZESTRIL) 10 MG tablet Take 10 mg by mouth daily.       multivitamin (ONE A DAY) per tablet Take 1 tablet by mouth daily.       omeprazole (PRILOSEC) 20 MG capsule Take 20 mg by mouth daily.       simvastatin (ZOCOR) 20 MG tablet Take 20 mg by mouth bedtime.       triamcinolone (KENALOG) 0.025 % cream Apply topically 3 (three) times a day.       budesonide-formoterol (SYMBICORT) 160-4.5 mcg/actuation inhaler Inhale 2 puffs 2 (two) times a day.       pioglitazone-metformin (ACTOPLUS MET XR) 15-1,000 mg TM24 Take 1 tablet by mouth 2 (two) times a day.       No facility-administered medications prior to visit.

## 2021-06-20 NOTE — LETTER
Letter by Mitch Duncan MD at      Author: Mitch Duncan MD Service: -- Author Type: --    Filed:  Encounter Date: 4/20/2020 Status: (Other)         April 20, 2020     Patient: Walt Lambert   YOB: 1950   Date of Visit: 4/20/2020       To Whom It May Concern:    It is my medical opinion that Walt Lambert may return to work on June 16, 2020.    If you have any questions or concerns, please don't hesitate to call.    Sincerely,        Electronically signed by Mitch Duncan MD

## 2021-06-24 NOTE — TELEPHONE ENCOUNTER
Fax received from pharmacy stating PA needed for Trulicity.  Of course, no alternate meds listed. Would you like a PA or alternate, Christina?

## 2021-06-24 NOTE — TELEPHONE ENCOUNTER
dulaglutide (TRULICITY) 1.5 mg/0.5 mL PnIj 1.5 mg, Subcutaneous, Weekly           Summary: Inject 1.5 mg under the skin once a week., Starting Tue 12/18/2018, Normal   Dose, Frequency: 1.5 mg, Weekly  Start: 12/18/2018  Ord/Sold: 12/18/2018 (O)  Report  Adh:   Taking:   Long-term:   Pharmacy: [facility]/pharmacy #[id number] - [place], [place] - [address]  Med Dose History       Patient Sig: Inject 1.5 mg under the skin once a week.       Ordered on: 12/18/2018       Authorized by: COOPER CHU       Dispense: 6 mL       Refills: 2 ordered       Med Comments: 84 day supply.        Please submit a PA for the above  Medication.

## 2021-06-25 ENCOUNTER — OFFICE VISIT - HEALTHEAST (OUTPATIENT)
Dept: ENDOCRINOLOGY | Facility: CLINIC | Age: 71
End: 2021-06-25

## 2021-06-25 DIAGNOSIS — E11.59 TYPE 2 DIABETES MELLITUS WITH OTHER CIRCULATORY COMPLICATION, WITH LONG-TERM CURRENT USE OF INSULIN (H): ICD-10-CM

## 2021-06-25 DIAGNOSIS — Z79.4 TYPE 2 DIABETES MELLITUS WITH OTHER CIRCULATORY COMPLICATION, WITH LONG-TERM CURRENT USE OF INSULIN (H): ICD-10-CM

## 2021-06-26 ENCOUNTER — HEALTH MAINTENANCE LETTER (OUTPATIENT)
Age: 71
End: 2021-06-26

## 2021-06-27 RX ORDER — AMLODIPINE BESYLATE 10 MG/1
10 TABLET ORAL DAILY
Status: SHIPPED | COMMUNITY
Start: 2020-03-03

## 2021-06-28 NOTE — PROGRESS NOTES
Progress Notes by Mitch Duncan MD at 1/7/2020  9:10 AM     Author: Mitch Duncan MD Service: -- Author Type: Physician    Filed: 1/7/2020 10:15 AM Encounter Date: 1/7/2020 Status: Signed    : Mitch Duncan MD (Physician)           Thank you, Piyush Reeves MD, for asking the [facility] team to see Mr. Walt Lambert to evaluate Abnormal Stress Test.      Assessment/Recommendations   Assessment:    1. Dyspnea on exertion - I suspect this is most likely related to obesity and deconditioning, but with his mildly abnormal stress test and 27 year history of diabetes, coronary artery disease is not excluded. Before advising aggressive lifestyle changes for weight management we need to further define his coronary anatomy.   2. Abnormal stress test - may indicated mild CAD in distal RCA, be an artifact, or underrepresent multivessel disease based on this patient's risk factors and symptoms. Further definition of coronary anatomy is warranted.  3. Morbid obesity  4. Sedentary lifestyle  5. Insulin dependent DMII  6. Hypertension  7. Hyperlipidemia.    Plan:  1. CT coronary angiogram   2. echocardiogram  3. Provided no high grade CAD identified recommend aggressive lifestyle modification through diet and exercise with goal of weight loss.  4. Follow up in 3 months or sooner if needed.         History of Present Illness   Mr. Walt Lambert is a 69 y.o. male with a significant past history of insulin dependent DMII, hypertension, hyperlipidemia, obesity, YANE who presents for evaluation of an abnormal stress test.    Mr. Lambert recently had a stress test performed to evaluate for dyspnea on exertion. The stress test was read as mildly abnormal with apical inferior wall ischemia and cannot exclude artifact.  Artifact noted on this study were diaphragmatic attenuation as well as motion artifact. Mr. Lambert has had progressive dyspnea on exertion for at least the past year.   This is been associated with a 10 pound weight gain over the past year as well as 20 pounds over the past 2 years.  He is able to climb 1 flight of stairs but is breathing heavy at the top of that flight.  He does not think he can go more than one flight of stairs.  He also has a slight hill in his backyard and needs to stop and catch his breath when walking from the edge of his property back up to his house while going up that hill.  He leads a admittedly sedentary lifestyle and gets very little physical activity throughout the course of the day.  He also notes that at times when he has to push his exertion beyond the initial shortness of breath he does get some mild chest discomfort as well.  All of the symptoms are relieved with rest.  He does have obstructive sleep apnea and reports strict adherence to his CPAP therapy.    Other than noted above, Mr. Lambert denies any chest pain/pressure/tightness, shortness of breath at rest or with exertion, light headedness/dizziness, pre-syncope, syncope, lower extremity swelling, palpitations, paroxysmal nocturnal dyspnea (PND), or orthopnea.     Cardiac Problems and Cardiac Diagnostics     Most Recent Cardiac testing:  ECG dated 1/7/2020 (personaly reviewed and interpreted): normal sinus rhythm with left axis deviation.    Stress test: dated 12/24/19 revealed   ?  The [pharmacologic] nuclear stress test is abnormal.  ?  There is a small area of mild ischemia in the inferior segment(s) of the left ventricle. Cannot exclude artifact.  ?  The left ventricular ejection fraction at stress is 61%.  ?  A prior study was conducted on 3/10/2015.  This study has changes noted when compared with the prior study:  mild inferior wall ischemia is now present       Medications  Allergies   Current Outpatient Medications   Medication Sig Dispense Refill   ? aspirin 81 MG EC tablet Take 81 mg by mouth daily.     ? COMBIVENT RESPIMAT  mcg/actuation Mist inhaler INHALE 1 PUFF BY MOUTH  4 TIMES A DAY AS NEEDED  3   ? flash glucose scanning reader (FREESTYLE ANA M 14 DAY READER) Misc Use 1 each As Directed every 14 (fourteen) days. 2 each 3   ? fluconazole (DIFLUCAN) 150 MG tablet Take 150 mg by mouth as needed.     ? FREESTYLE ANA M 14 DAY SENSOR Kit USE 1 EVERY 14 DAYS AS DIRECTED 6 kit 0   ? insulin aspart U-100 (NOVOLOG U-100 INSULIN ASPART) 100 unit/mL injection Inject up to 375 units subcut via the pump daily. (Patient taking differently: Inject up to 300 units subcut via the pump daily.      ) 338 mL PRN   ? lisinopril (PRINIVIL,ZESTRIL) 10 MG tablet Take 20 mg by mouth daily.      ? multivitamin (ONE A DAY) per tablet Take 1 tablet by mouth daily.     ? omeprazole (PRILOSEC) 20 MG capsule Take 20 mg by mouth daily.     ? simvastatin (ZOCOR) 20 MG tablet Take 20 mg by mouth bedtime.     ? triamcinolone (KENALOG) 0.025 % cream Apply topically 3 (three) times a day.     ? TRULICITY 1.5 mg/0.5 mL PnIj INJECT 1.5 MG UNDER THE SKIN ONCE A WEEK 6 mL 1     No current facility-administered medications for this visit.       No Known Allergies     Physical Examination Review of Systems   Vitals:    01/07/20 0916   BP: 158/90   Pulse: 84   Resp: 16     Body mass index is 51.37 kg/m .  Wt Readings from Last 3 Encounters:   01/07/20 (!) 358 lb (162.4 kg)   09/26/19 (!) 350 lb 1.6 oz (158.8 kg)   04/04/19 (!) 349 lb 4.8 oz (158.4 kg)       General Appearance:   Pleasant male, appears stated age. no acute distress, morbidly obese body habitus   ENT/Mouth: membranes moist, no apparent gingival bleeding.      EYES:  no scleral icterus, normal conjunctivae   Neck: Supple. JVD unable to be accurately assessed due to neck anatomy.   Respiratory:   lungs are clear to auscultation, no rales or wheezing, equal chest wall expansion    Cardiovascular:   Regular rhythm, normal rate. Normal first and second heart sounds with no murmurs, rubs, or gallops; trace BLE edema bilaterally    Abdomen/GI:  Soft, non-tender    Extremities: no cyanosis or clubbing   Skin: Some venous stasis changes noted in lower legs.   Heme/lymph/ Immunology No apparent bleeding noted.   Neurologic: Alert and oriented. normal gait, no tremors     Psychiatric: Pleasant, calm, appropriate affect.    A complete 10 system review of systems was performed and is negative except as mentioned in the HPI or below:  General: WNL  Eyes: WNL  Ears/Nose/Throat: WNL  Lungs: Shortness of Breath  Heart: Shortness of Breath with activity  Stomach: WNL  Bladder: WNL  Muscle/Joints: WNL  Skin: Poor Wound Healing  Nervous System: Dizziness, Loss of Balance  Mental Health: WNL     Blood: WNL       Past History   Past Medical History:   Past Medical History:   Diagnosis Date   ? Asthma    ? Chronic obstructive pulmonary disease (H) 9/29/2019   ? Diabetes mellitus (H)    ? Sleep apnea        Past Surgical History:   Past Surgical History:   Procedure Laterality Date   ? COLONOSCOPY N/A 9/20/2017    Procedure: COLONOSCOPY;  Surgeon: Michael Fagan MD;  Location[facility] [place];  Service:    ? GALLBLADDER SURGERY         Family History:   Family History   Problem Relation Age of Onset   ? Leukemia Mother    ? Heart failure Mother    ? Leukemia Father    ? Colon cancer Sister         Social History:   Social History     Socioeconomic History   ? Marital status:      Spouse name: Not on file   ? Number of children: Not on file   ? Years of education: Not on file   ? Highest education level: Not on file   Occupational History   ? Not on file   Social Needs   ? Financial resource strain: Not on file   ? Food insecurity:     Worry: Not on file     Inability: Not on file   ? Transportation needs:     Medical: Not on file     Non-medical: Not on file   Tobacco Use   ? Smoking status: Never Smoker   ? Smokeless tobacco: Never Used   Substance and Sexual Activity   ? Alcohol use: Yes     Comment: one beer every two weeks    ? Drug use: No   ? Sexual activity: Not on file    Lifestyle   ? Physical activity:     Days per week: Not on file     Minutes per session: Not on file   ? Stress: Not on file   Relationships   ? Social connections:     Talks on phone: Not on file     Gets together: Not on file     Attends [employer] service: Not on file     Active member of club or organization: Not on file     Attends meetings of clubs or organizations: Not on file     Relationship status: Not on file   ? Intimate partner violence:     Fear of current or ex partner: Not on file     Emotionally abused: Not on file     Physically abused: Not on file     Forced sexual activity: Not on file   Other Topics Concern   ? Not on file   Social History Narrative   ? Not on file              Lab Results    Chemistry/lipid CBC Cardiac Enzymes/BNP/TSH/INR   Lab Results   Component Value Date    CHOL 175 12/20/2019    HDL 40 12/20/2019    LDLCALC 74 12/20/2019    TRIG 305 (H) 12/20/2019    CREATININE 1.11 12/20/2019    BUN 17 12/20/2019    K 4.2 12/20/2019     12/20/2019     12/20/2019    CO2 25 12/20/2019    Lab Results   Component Value Date    WBC 9.4 02/20/2017    HGB 13.0 (L) 02/20/2017    HCT 41.4 02/20/2017    MCV 89 02/20/2017     02/20/2017    Lab Results   Component Value Date    TROPONINI <0.01 06/02/2012    TSH 3.37 01/12/2015

## 2021-06-29 NOTE — PROGRESS NOTES
"Progress Notes by Mitch Duncan MD at 4/20/2020 10:10 AM     Author: Mitch Duncan MD Service: -- Author Type: Physician    Filed: 4/20/2020 10:42 AM Encounter Date: 4/20/2020 Status: Signed    : Mitch Duncan MD (Physician)           The patient has been notified of following:     \"This telephone visit will be conducted via a call between you and your physician/provider. We have found that certain health care needs can be provided without the need for a physical exam.  This service lets us provide the care you need with a phone conversation.  If a prescription is necessary we can send it directly to your pharmacy.  If lab work is needed we can place an order for that and you can then stop by our lab to have the test done at a later time. If during the course of the call the physician/provider feels a telephone visit is not appropriate, you will not be charged for this service.\" Verbal consent has been obtained for this service by care team member:         HEART CARE PHONE ENCOUNTER        The patient has chosen to have the visit conducted as a telephone visit, to reduce risk of exposure given the current status of Coronavirus in our community. This telephone visit is being conducted via a call between the patient and physician/provider. Health care needs are being provided without a physical exam.     Assessment/Recommendations   Assessment:    1. Coronary artery disease s/p recent CABG - stable without angina. Slowly recovering.  2. Morbid obesity- weight remains uncontrolled.  3. Hypertension- BP controlled at cardiac rehab.    Plan:   1. Continue medications as prescribed  2. I strongly encouraged at least a daily walk for exercise. He should slowly increase the amount of exercise he gets daily. Also discussed importance of exercise in role of weight reduction.  3. Follow up in 6 months or sooner if needed.    I have reviewed the note as documented.  This accurately captures the substance " of my conversation with the patient.    Total time of call between patient and provider was 14 minutes   Start Time: 1020   Stop Time: 1034       History of Present Illness/Subjective    Walt Lambert is a 69 y.o. male who is being evaluated via a billable telephone visit.      Mr. Lambert recently had a coronary artery bypass graft surgery in early February of this year.  He states that he is slowly recovering from his surgery.  He continues to feel tired and short of breath with exertion.  This is slowly improving day by day.  He also notes some dizziness upon getting out of bed in the morning.  This is described more as vertigo then lightheadedness or presyncope.  His blood pressure in the morning before medications tends to be in the 140s over 80s.  He is participating in cardiac rehab 1 day a week and tolerating this with progression.  He admits that he does not get really much activity or exercise on the days that he is not in cardiac rehab.    I have reviewed and updated the patient's Past Medical History, Social History, Family History and Medication List.     Physical Examination not performed given phone encounter Review of Systems                                                Medical History  Surgical History Family History Social History   Past Medical History:   Diagnosis Date   ? Asthma    ? Chronic obstructive pulmonary disease (H) 9/29/2019   ? Diabetes mellitus (H)     insulin pump   ? Diabetic neuropathy (H)    ? GERD (gastroesophageal reflux disease)    ? Hyperlipidemia    ? Hypertension    ? Morbid obesity (H)    ? Sleep apnea     uses CPAP    Past Surgical History:   Procedure Laterality Date   ? COLONOSCOPY N/A 9/20/2017    Procedure: COLONOSCOPY;  Surgeon: Michael Fagan MD;  Location: [facility] GI;  Service:    ? CV CORONARY ANGIOGRAM N/A 2/19/2020    Procedure: Coronary Angiogram;  Surgeon: Daniel Hayden MD;  Location: [facility] Cath Lab;  Service: Cardiology   ? CV LEFT HEART  CATHETERIZATION WITH LEFT VENTRICULOGRAM N/A 2/19/2020    Procedure: Left Heart Catheterization with Left Ventriculogram;  Surgeon: Daniel Hayden MD;  Location: [facility] Cath Lab;  Service: Cardiology   ? GALLBLADDER SURGERY      Family History   Problem Relation Age of Onset   ? Leukemia Mother    ? Heart failure Mother    ? Leukemia Father    ? Colon cancer Sister     Social History     Socioeconomic History   ? Marital status:      Spouse name: Not on file   ? Number of children: Not on file   ? Years of education: Not on file   ? Highest education level: Not on file   Occupational History   ? Not on file   Social Needs   ? Financial resource strain: Not on file   ? Food insecurity     Worry: Not on file     Inability: Not on file   ? Transportation needs     Medical: Not on file     Non-medical: Not on file   Tobacco Use   ? Smoking status: Former Smoker   ? Smokeless tobacco: Never Used   ? Tobacco comment: quit 40 years ago   Substance and Sexual Activity   ? Alcohol use: Yes     Comment: one beer every two weeks    ? Drug use: No   ? Sexual activity: Not on file   Lifestyle   ? Physical activity     Days per week: Not on file     Minutes per session: Not on file   ? Stress: Not on file   Relationships   ? Social connections     Talks on phone: Not on file     Gets together: Not on file     Attends Congregational service: Not on file     Active member of club or organization: Not on file     Attends meetings of clubs or organizations: Not on file     Relationship status: Not on file   ? Intimate partner violence     Fear of current or ex partner: Not on file     Emotionally abused: Not on file     Physically abused: Not on file     Forced sexual activity: Not on file   Other Topics Concern   ? Not on file   Social History Narrative   ? Not on file          Medications  Allergies   Current Outpatient Medications   Medication Sig Dispense Refill   ? acetaminophen (TYLENOL) 325 MG tablet Take 2 tablets  (650 mg total) by mouth every 4 (four) hours as needed.  0   ? aspirin 81 MG EC tablet Take 81 mg by mouth daily.     ? atorvastatin (LIPITOR) 80 MG tablet Take 1 tablet (80 mg total) by mouth daily. 30 tablet 11   ? COMBIVENT RESPIMAT  mcg/actuation Mist inhaler INHALE 1 PUFF BY MOUTH 4 TIMES A DAY AS NEEDED  3   ? flash glucose scanning reader (FREESTYLE ANA M 14 DAY READER) Misc Use 1 each As Directed every 14 (fourteen) days. 2 each 3   ? FREESTYLE ANA M 14 DAY SENSOR Kit USE 1 EVERY 14 DAYS AS DIRECTED 6 kit 0   ? insulin pump cartridge Crtg Inject 4 Units/hr under the skin continuous. Bolus average 15-20 units before meals (depends on meal)   1:20 insulin to carb ratio  1 unit insulin to lower glucose by 5mg/dl 1 Cartridge 0   ? insulin pump cartridge Inject under the skin continuous. Insulin pump discharge instructions from 03/03/20.  This is intended as additional information to the patient's PTA insulin pump order.  Continue with insulin pump at present settings. 1 each 0   ? lisinopril (PRINIVIL,ZESTRIL) 10 MG tablet Take 20 mg by mouth daily.      ? metoprolol tartrate (LOPRESSOR) 100 MG tablet Take 1 tablet (100 mg total) by mouth 2 (two) times a day. 60 tablet 2   ? multivitamin (ONE A DAY) per tablet Take 1 tablet by mouth daily.     ? nitroglycerin (NITROSTAT) 0.4 MG SL tablet Place 1 tablet (0.4 mg total) under the tongue every 5 (five) minutes as needed for chest pain (chest discomfort). Take 1 tablet sublingual for chest symptoms and allow to dissolve under tongue without swallowing.  If symptoms do not resolve in 5 minutes, repeat dose and contact EMS by calling 911.  Continue to repeat dose sublingual every 5 minutes for total 4 doses. 1 Bottle 0   ? omeprazole (PRILOSEC) 20 MG capsule Take 20 mg by mouth daily.     ? oxyCODONE (ROXICODONE) 5 MG immediate release tablet Take 1 tablet (5 mg total) by mouth every 6 (six) hours as needed for pain. 15 tablet 0   ? triamcinolone (KENALOG) 0.025  % cream Apply 1 application topically 3 (three) times a day as needed.        No current facility-administered medications for this visit.     No Known Allergies      Lab Results    Chemistry/lipid CBC Cardiac Enzymes/BNP/TSH/INR   Lab Results   Component Value Date    CHOL 161 02/19/2020    HDL 36 (L) 02/19/2020    LDLCALC 91 02/19/2020    TRIG 169 (H) 02/19/2020    CREATININE 1.04 03/03/2020    BUN 29 (H) 03/03/2020    K 3.3 (L) 03/03/2020     03/03/2020     03/03/2020    CO2 29 03/03/2020    Lab Results   Component Value Date    WBC 10.7 03/03/2020    HGB 13.0 (L) 03/03/2020    HCT 41.6 03/03/2020    MCV 95 03/03/2020     03/03/2020    Lab Results   Component Value Date    TROPONINI <0.01 06/02/2012    TSH 3.37 01/12/2015    INR 1.42 (H) 02/27/2020

## 2021-06-29 NOTE — PROGRESS NOTES
Progress Notes by Mitch Duncan MD at 9/17/2020  3:10 PM     Author: Mitch Duncan MD Service: -- Author Type: Physician    Filed: 9/17/2020  3:41 PM Encounter Date: 9/17/2020 Status: Signed    : Mitch Duncan MD (Physician)           Thank you, Piyush Reeves MD, for asking the [facility] team to see Mr. Walt Lambert to  Follow-up coronary artery disease.      Assessment/Recommendations   Assessment:    1. Coronary artery disease s/p CABG (LIMA to LAD, L radial artery to PDA, SVG to OM and SVG to Diagonal branch) by Dr. Russell on 2/26/2020 - stable without angina  2. Hypertension - BP not optimally controlled.  3. Hyperlipidemia - on appropriate statin  4. Morbid obesity - uncontrolled  5. Insulin dependent DMII - reasonably controlled.  6. Sedentary lifestyle    Plan:  1. Increase lisinopril to 20 mg two times a day  2. If BP not adequately controlled, consider either changing metoprolol to carvedilol 37.5 mg two times a day or adding spironolactone  3. Advised regular exercise  4. Follow up in March, 2021.         History of Present Illness   Mr. Walt Lambert is a 69 y.o. male who presents for follow-up of his coronary artery disease.    Mr. Lambert had a coronary artery bypass graft surgery in early February of this year. This was performed after a stress test ordered for dyspnea on exertion was suggestive of ischemia vs artifact and a CTA coronary angiogram revealed severely elevated CAC and multivessel CAD.     Mr. Lambert reports that his chest wall pain has improved. He is not very active and hasn't been getting regular exercise. With the activity that he has been doing he does not report angina. Weight has increased over the past 5 months.    Other than noted above, Mr. Lambert denies any chest pain/pressure/tightness, shortness of breath at rest or with exertion, light headedness/dizziness, pre-syncope, syncope, lower extremity swelling,  palpitations, paroxysmal nocturnal dyspnea (PND), or orthopnea.     Cardiac Problems and Cardiac Diagnostics     Most Recent Cardiac testing:    ECHO (report reviewed):   Echo results:   Results for orders placed during the hospital encounter of 02/06/20   Echo Complete [ECH10] 02/06/2020    Narrative 1. Normal left ventricular size and systolic performance with a visually   estimated ejection fraction of 65%.   2. There is mild to moderate concentric increase in left ventricular wall   thickness.   3. No significant valvular heart disease is identified on this study.   4. Normal right ventricular size and systolic performance.        Stress test: dated 12/24/2019 revealed    The nuclear stress test is abnormal.  ?  There is a small area of mild ischemia in the inferior segment(s) of the left ventricle. Cannot exclude artifact.  ?  The left ventricular ejection fraction at stress is 61%.  ?  A prior study was conducted on 3/10/2015.  This study has changes noted when compared with the prior study:  mild inferior wall ischemia is now present    CT coronary angiogram with CAC 2/6/2020  The patient's image quality is poor due to morbid obesity.  The contrast did not fill coronary artery well.     1.  The total Agatston calcium score is 3784. A calcium score in this range places the individual in the 100th percentile when compared to an age and gender matched control group and implies a very high risk of cardiac events in the next ten years.     2.  Mild disease in left main.     3.  Diffuse calcified three coronary arteries.  Due to poor image quality, cannot exclude severe stenosis in mid LAD, proximal LCX and mid RCA.     Recommend invasive coronary angiogram for further cardiac evaluation if clinically indicated.    Cardiac cath: from 2/19/2020 demonstrated     The left ventricular size is normal. The left ventricular systolic function is normal. LV systolic pressure is normal. LV end diastolic pressure is normal.  There are no wall motion abnormalities in the left ventricle.    2nd Mrg lesion is 70% stenosed.    Ost LM lesion is 30% stenosed.    Ost LAD to Mid LAD lesion is 70% stenosed.    Mid LAD lesion is 50% stenosed.    Mid RCA lesion is 75% stenosed.    Prox RCA lesion is 90% stenosed.    Mild aortic valve gradient, possible aortic stenosis       Medications  Allergies   Current Outpatient Medications   Medication Sig Dispense Refill   ? ADVAIR DISKUS 250-50 mcg/dose DISKUS Inhale 1 puff 2 (two) times a day.     ? aspirin 81 MG EC tablet Take 81 mg by mouth daily.     ? atorvastatin (LIPITOR) 80 MG tablet Take 1 tablet (80 mg total) by mouth daily. 30 tablet 11   ? flash glucose scanning reader ([employer] ANA M 14 DAY READER) Misc Use 1 each As Directed every 14 (fourteen) days. 2 each 3   ? furosemide (LASIX) 40 MG tablet Take 1 tablet by mouth daily.     ? insulin aspart U-100 (NOVOLOG U-100 INSULIN ASPART) 100 unit/mL injection use 375 units of insulin daily via the pump 115 mL 5   ? insulin pump cartridge Crtg Inject 4 Units/hr under the skin continuous. Bolus average 15-20 units before meals (depends on meal)   1:20 insulin to carb ratio  1 unit insulin to lower glucose by 5mg/dl 1 Cartridge 0   ? insulin pump cartridge Inject under the skin continuous. Insulin pump discharge instructions from 03/03/20.  This is intended as additional information to the patient's PTA insulin pump order.  Continue with insulin pump at present settings. 1 each 0   ? lisinopriL (PRINIVIL,ZESTRIL) 20 MG tablet Take 1 tablet (20 mg total) by mouth 2 (two) times a day. 180 tablet 3   ? metoprolol tartrate (LOPRESSOR) 100 MG tablet Take 1 tablet (100 mg total) by mouth 2 (two) times a day. 180 tablet 2   ? multivitamin (ONE A DAY) per tablet Take 1 tablet by mouth daily.     ? nitroglycerin (NITROSTAT) 0.4 MG SL tablet Place 1 tablet (0.4 mg total) under the tongue every 5 (five) minutes as needed for chest pain (chest discomfort). Take 1  tablet sublingual for chest symptoms and allow to dissolve under tongue without swallowing.  If symptoms do not resolve in 5 minutes, repeat dose and contact EMS by calling 911.  Continue to repeat dose sublingual every 5 minutes for total 4 doses. 1 Bottle 0   ? omeprazole (PRILOSEC) 20 MG capsule Take 20 mg by mouth daily.     ? triamcinolone (KENALOG) 0.025 % cream Apply 1 application topically 3 (three) times a day as needed.      ? TRULICITY 1.5 mg/0.5 mL PnIj INJECT 0.5 ML UNDER THE SKIN ONCE A WEEK. 6 mL 0   ? acetaminophen (TYLENOL) 325 MG tablet Take 2 tablets (650 mg total) by mouth every 4 (four) hours as needed.  0   ? COMBIVENT RESPIMAT  mcg/actuation Mist inhaler INHALE 1 PUFF BY MOUTH 4 TIMES A DAY AS NEEDED  3   ? flash glucose sensor (FREESTYLE ANA M 14 DAY SENSOR) Kit Use 1 each As Directed every 14 (fourteen) days. 6 kit 1   ? oxyCODONE (ROXICODONE) 5 MG immediate release tablet Take 1 tablet (5 mg total) by mouth every 6 (six) hours as needed for pain. 15 tablet 0     No current facility-administered medications for this visit.       No Known Allergies     Physical Examination Review of Systems   Vitals:    09/17/20 1515   BP: 136/80   Pulse: 68   Resp: 20     Body mass index is 49.5 kg/m .  Wt Readings from Last 3 Encounters:   09/17/20 (!) 345 lb (156.5 kg)   06/22/20 (!) 339 lb 11.2 oz (154.1 kg)   06/17/20 (!) 341 lb 12.8 oz (155 kg)       General Appearance:   Pleasant male, appears stated age. no acute distress, morbidly obese body habitus   ENT/Mouth: membranes moist, no apparent gingival bleeding.      EYES:  no scleral icterus, normal conjunctivae   Neck: supple   Respiratory:   lungs are clear to auscultation, no rales or wheezing, stable sternal scar, equal chest wall expansion    Cardiovascular:   Regular rhythm, normal rate. Normal first and second heart sounds with no murmurs, rubs, or gallops; unable to visualize JVD due to neck anatomy, trace edema bilaterally    Abdomen/GI:   Soft, non-tender   Extremities: no cyanosis or clubbing   Skin: Some venous stasis changes in BLE   Heme/lymph/ Immunology No apparent bleeding noted.   Neurologic: Alert and oriented. normal gait, no tremors     Psychiatric: Pleasant, calm, appropriate affect.    A complete 10 system review of systems was performed and is negative except as mentioned in the HPI or below:  General: WNL  Eyes: WNL  Ears/Nose/Throat: WNL  Lungs: WNL  Heart: WNL  Stomach: WNL  Bladder: WNL  Muscle/Joints: WNL  Skin: WNL  Nervous System: WNL  Mental Health: WNL     Blood: WNL       Past History   Past Medical History:   Past Medical History:   Diagnosis Date   ? Asthma    ? Chronic obstructive pulmonary disease (H) 9/29/2019   ? Diabetes mellitus (H)     insulin pump   ? Diabetic neuropathy (H)    ? GERD (gastroesophageal reflux disease)    ? Hyperlipidemia    ? Hypertension    ? Morbid obesity (H)    ? Sleep apnea     uses CPAP       Past Surgical History:   Past Surgical History:   Procedure Laterality Date   ? COLONOSCOPY N/A 9/20/2017    Procedure: COLONOSCOPY;  Surgeon: Michael Fagan MD;  Location: [facility];  Service:    ? CV CORONARY ANGIOGRAM N/A 2/19/2020    Procedure: Coronary Angiogram;  Surgeon: Daniel Hayden MD;  Location: [facility];  Service: Cardiology   ? CV LEFT HEART CATHETERIZATION WITH LEFT VENTRICULOGRAM N/A 2/19/2020    Procedure: Left Heart Catheterization with Left Ventriculogram;  Surgeon: Daniel Hayden MD;  Location: [facility];  Service: Cardiology   ? GALLBLADDER SURGERY         Family History:   Family History   Problem Relation Age of Onset   ? Leukemia Mother    ? Heart failure Mother    ? Leukemia Father    ? Colon cancer Sister         Social History:   Social History     Socioeconomic History   ? Marital status:      Spouse name: Not on file   ? Number of children: Not on file   ? Years of education: Not on file   ? Highest education level: Not on file    Occupational History   ? Not on file   Social Needs   ? Financial resource strain: Not on file   ? Food insecurity     Worry: Not on file     Inability: Not on file   ? Transportation needs     Medical: Not on file     Non-medical: Not on file   Tobacco Use   ? Smoking status: Former Smoker   ? Smokeless tobacco: Never Used   ? Tobacco comment: quit 40 years ago   Substance and Sexual Activity   ? Alcohol use: Yes     Comment: one beer every two weeks    ? Drug use: No   ? Sexual activity: Not on file   Lifestyle   ? Physical activity     Days per week: Not on file     Minutes per session: Not on file   ? Stress: Not on file   Relationships   ? Social connections     Talks on phone: Not on file     Gets together: Not on file     Attends [employer] service: Not on file     Active member of club or organization: Not on file     Attends meetings of clubs or organizations: Not on file     Relationship status: Not on file   ? Intimate partner violence     Fear of current or ex partner: Not on file     Emotionally abused: Not on file     Physically abused: Not on file     Forced sexual activity: Not on file   Other Topics Concern   ? Not on file   Social History Narrative   ? Not on file              Lab Results    Chemistry/lipid CBC Cardiac Enzymes/BNP/TSH/INR   Lab Results   Component Value Date    CHOL 161 02/19/2020    HDL 36 (L) 02/19/2020    LDLCALC 91 02/19/2020    TRIG 169 (H) 02/19/2020    CREATININE 1.04 03/03/2020    BUN 29 (H) 03/03/2020    K 3.3 (L) 03/03/2020     03/03/2020     03/03/2020    CO2 29 03/03/2020    Lab Results   Component Value Date    WBC 10.7 03/03/2020    HGB 13.0 (L) 03/03/2020    HCT 41.6 03/03/2020    MCV 95 03/03/2020     03/03/2020    Lab Results   Component Value Date    TROPONINI <0.01 06/02/2012    TSH 3.37 01/12/2015    INR 1.42 (H) 02/27/2020

## 2021-07-03 NOTE — ADDENDUM NOTE
Addendum Note by Christie Kirkpatrick RN at 5/12/2020  3:26 PM     Author: Christie Kirkpatrick RN Service: -- Author Type: Registered Nurse    Filed: 5/12/2020  3:26 PM Encounter Date: 4/27/2020 Status: Signed    : Christie Kirkpatrick RN (Registered Nurse)    Addended by: CHRISTIE KIRKPATRICK on: 5/12/2020 03:26 PM        Modules accepted: Orders

## 2021-07-03 NOTE — ADDENDUM NOTE
Addendum Note by Christie Kirkpatrick RN at 5/11/2020 10:31 AM     Author: Christie Kirkpatrick RN Service: -- Author Type: Registered Nurse    Filed: 5/11/2020 10:31 AM Encounter Date: 4/27/2020 Status: Signed    : Christie Kirkpatrick RN (Registered Nurse)    Addended by: CHRISTIE KIRKPATRICK on: 5/11/2020 10:31 AM        Modules accepted: Orders

## 2021-07-03 NOTE — ANESTHESIA PREPROCEDURE EVALUATION
Anesthesia Preprocedure Evaluation by Mars Sloan MD at 2/25/2020  9:08 AM     Author: Mars Sloan MD Service: -- Author Type: Physician    Filed: 2/25/2020  9:43 AM Date of Service: 2/25/2020  9:08 AM Status: Addendum    : Mars Sloan MD (Physician)    Related Notes: Original Note by Mars Sloan MD (Physician) filed at 2/25/2020  9:12 AM       Anesthesia Evaluation      Patient summary reviewed   No history of anesthetic complications     Airway   Mallampati: III  Neck ROM: full   Pulmonary - normal exam   (+) COPD, asthma  shortness of breath (with exertion), sleep apnea on CPAP, ,                          Cardiovascular - normal exam  Exercise tolerance: < 4 METS  (+) hypertension, CAD, , hypercholesterolemia,     ECG reviewed        Neuro/Psych      Comments: Diabetic neuropathy     Endo/Other    (+) diabetes mellitus (Insulin pump 10 units/hour) type 2 using insulin, obesity (BMI 51),      GI/Hepatic/Renal    (+) GERD,        Other findings: 2/19/20 Coronary angio  Conclusion       The left ventricular size is normal. The left ventricular systolic function is normal. LV systolic pressure is normal. LV end diastolic pressure is normal. There are no wall motion abnormalities in the left ventricle.    2nd Mrg lesion is 70% stenosed.    Ost LM lesion is 30% stenosed.    Ost LAD to Mid LAD lesion is 70% stenosed.    Mid LAD lesion is 50% stenosed.    Mid RCA lesion is 75% stenosed.    Prox RCA lesion is 90% stenosed.    Mild aortic valve gradient, possible aortic stenosis     2/6/20 Echo  Summary     1. Normal left ventricular size and systolic performance with a visually estimated ejection fraction of 65%.   2. There is mild to moderate concentric increase in left ventricular wall thickness.   3. No significant valvular heart disease is identified on this study.   4. Normal right ventricular size and systolic performance.     12/24/19 NM Stress  Conclusion        ? The nuclear stress test is  abnormal.  ?  There is a small area of mild ischemia in the inferior segment(s) of the left ventricle. Cannot exclude artifact.  ?  The left ventricular ejection fraction at stress is 61%.  ?  A prior study was conducted on 3/10/2015.  This study has changes noted when compared with the prior study:  mild inferior wall ischemia is now present       02/19/20 1721 US Carotid Bilateral SCAN  Impression:   1. Mild atheromatous plaque in the carotid arteries.  2. By peak systolic velocity, moderate left ICA narrowing (50-69%). No significant right ICA narrowing.               Dental    (+) caps    Comment: Molar crowns/caps.                          Anesthesia Plan  Planned anesthetic: general endotracheal  Glidescope  Phenylephrine inline for induction    IV insulin infusion.  Disconnect patient's insulin pump in preop.  Close BS monitoring    For pain:  Ketamine  Magnesium  Methadone 20 mg  Precedex    LUIS for monitoring      ASA 4   Induction: intravenous   Anesthetic plan and risks discussed with: patient and spouse  Anesthesia plan special considerations: video-assisted, antiemetics, CVP line, arterial catheterization, pulmonary artery catheterization, IV therapy two IVs, dexmedetomidine  Post-op plan: routine recovery and extended intubation/vent support

## 2021-07-04 ENCOUNTER — COMMUNICATION - HEALTHEAST (OUTPATIENT)
Dept: INTERNAL MEDICINE | Facility: CLINIC | Age: 71
End: 2021-07-04

## 2021-07-04 DIAGNOSIS — E11.59 TYPE 2 DIABETES MELLITUS WITH OTHER CIRCULATORY COMPLICATION, WITH LONG-TERM CURRENT USE OF INSULIN (H): ICD-10-CM

## 2021-07-04 DIAGNOSIS — Z79.4 TYPE 2 DIABETES MELLITUS WITH OTHER CIRCULATORY COMPLICATION, WITH LONG-TERM CURRENT USE OF INSULIN (H): ICD-10-CM

## 2021-07-04 NOTE — TELEPHONE ENCOUNTER
Telephone Encounter by Darcy Herrera, RN at 7/4/2021 11:51 AM     Author: Darcy Herrera, RN Service: -- Author Type: Registered Nurse    Filed: 7/4/2021 11:53 AM Encounter Date: 7/3/2021 Status: Signed    : Darcy Herrera RN (Registered Nurse)       Refill Approved    Rx renewed per Medication Renewal Policy. Medication was last renewed on 4/3/21, last OV .    Darcy Herrera, [facility] Triage/Med Refill 7/4/2021     Requested Prescriptions   Pending Prescriptions Disp Refills   ? TRULICITY 3 mg/0.5 mL PnIj [Pharmacy Med Name: TRULICITY 3 MG/0.5 ML PEN]  0     Sig: INJECT 3 MG UNDER THE SKIN ONCE A WEEK.       Insulin/GLP-1 Refill Protocol Passed - 7/3/2021 10:54 AM        Passed - Visit with PCP or prescribing provider visit in last 6 months     Last office visit with prescriber/PCP: Visit date not found OR same dept: Visit date not found OR same specialty: Visit date not found Last physical: Visit date not found Last MTM visit: Visit date not found     Next appt within 3 mo: Visit date not found  Next physical within 3 mo: Visit date not found  Prescriber OR PCP: Emily Alfred NP  Last diagnosis associated with med order: 1. Type 2 diabetes mellitus with other circulatory complication, with long-term current use of insulin (H)  - TRULICITY 3 mg/0.5 mL PnIj [Pharmacy Med Name: TRULICITY 3 MG/0.5 ML PEN]; INJECT 3 MG UNDER THE SKIN ONCE A WEEK.; Refill: 0    If protocol passes may refill for 6 months if within 3 months of last provider visit (or a total of 9 months).              Passed - A1C in last 6 months     Hemoglobin A1c   Date Value Ref Range Status   06/18/2021 9.0 (H) <=5.6 % Final               Passed - Microalbumin in last year     Microalbumin, Random Urine   Date Value Ref Range Status   10/02/2020 17.87 (H) 0.00 - 1.99 mg/dL Final                  Passed - Blood pressure in last year     BP Readings from Last 1 Encounters:   09/17/20 136/80             Passed - Creatinine  done in last year     Creatinine   Date Value Ref Range Status   02/19/2021 1.11 0.70 - 1.30 mg/dL Final

## 2021-07-08 NOTE — TELEPHONE ENCOUNTER
Telephone Encounter by Christie Schmid, RN at 7/8/2021  4:24 PM     Author: Christie Schmid RN Service: -- Author Type: Registered Nurse    Filed: 7/8/2021  4:24 PM Encounter Date: 7/3/2021 Status: Signed    : Christie Schmid, RN (Registered Nurse)       RX sent, pt informed.

## 2021-07-08 NOTE — TELEPHONE ENCOUNTER
Telephone Encounter by Pao Marinelli at 7/8/2021  1:26 PM     Author: Pao Marinelli Service: -- Author Type: --    Filed: 7/8/2021  1:27 PM Encounter Date: 7/3/2021 Status: Signed    : Pao Marinelli       Pt calling in regards of: dulaglutide (TRULICITY) 1.5 mg/0.5 mL PnIj    Questioning why refill was completed.    He can be reached @ [phone]

## 2021-07-22 NOTE — PROGRESS NOTES
"Progress Notes by Emily Alfred NP at 6/25/2021  9:00 AM     Author: Emily Alfred NP Service: -- Author Type: Nurse Practitioner    Filed: 6/27/2021  7:13 AM Encounter Date: 6/25/2021 Status: Signed    : Emily Alfred NP (Nurse Practitioner)       Walt Lambert is a 70 y.o. male who is being evaluated via a billable video visit.      How would you like to obtain your AVS? MyChart.  If dropped from the video visit, the video invitation should be resent by: Text to cell phone: [phone]  Will anyone else be joining your video visit? No      Video Start Time: 0900       Reason for visit      1. Type 2 diabetes mellitus with other circulatory complication, with long-term current use of insulin (H)        HPI     Walt Lambert is a very pleasant 70 y.o. old male who presents for follow up.  SUMMARY:    Alvino is contacted today in f/u for DM 2. His current A1c is 9.0 and too high. He is using the T-slim insulin pump and the Ivelisse CGM. Seriously lacking in his management right now is regular, consistent movement. It is known that when he is walking frequently, his A1c will drop into the 6s. He notes that he does do some walking in the pool, but not on a regular basis. He likes this because then he doesn't have to wear his compression stockings. He does have some \"diabetic lesions\", but reports that they are in fairly good shape. I have never seen them. He had an ave BG of 232 over the last two weeks. He was above target range 100% of the time. He is taking almost 400 units of insulin a day. This likely could be lowered with more movement. He is also on Trulicity, 1.5 mg weekly.       Blood glucose data:  See below    Past Medical History     Patient Active Problem List   Diagnosis   ? Diabetes mellitus (H)   ? Morbid obesity (H)   ? Cardiomyopathy (H)   ? Chronic obstructive pulmonary disease (H)   ? Hyperlipidemia   ? Hypertension   ? Sleep apnea   ? Insulin pump status   ? MAHMOOD (dyspnea on " exertion)   ? Abnormal cardiac CT angiography   ? CAD (coronary artery disease)   ? S/P CABG (coronary artery bypass graft)   ? ARF (acute respiratory failure) (H)   ? Anemia due to blood loss, acute   ? Postoperative pain   ? Arteriosclerosis of coronary artery bypass graft   ? Body mass index (BMI) 50.0-59.9, adult   ? Pure hypertriglyceridemia   ? Tobacco user   ? Peripheral venous insufficiency        Family History       family history includes Colon cancer in his sister; Heart failure in his mother; Leukemia in his father and mother.    Social History      reports that he has quit smoking. He has never used smokeless tobacco. He reports current alcohol use. He reports that he does not use drugs.      Review of Systems     Patient has no polyuria or polydipsia, no chest pain, dyspnea or TIA's, no numbness, tingling or pain in extremities  Remainder negative except as noted in HPI.    Vital Signs     There were no vitals taken for this visit.  Wt Readings from Last 3 Encounters:   09/17/20 (!) 345 lb (156.5 kg)   06/22/20 (!) 339 lb 11.2 oz (154.1 kg)   06/17/20 (!) 341 lb 12.8 oz (155 kg)       Physical Exam     Constitutional:  Well developed, Well nourished  HENT:  Normocephalic,   Neck: Normal in appearance  Eyes:  PERRL, Conjunctiva pink  Respiratory:   No respiratory distress  Skin: No acanthosis nigricans, lipoatrophy or lipodystrophy  Neurologic:  Alert & oriented x 3, nonfocal  Psychiatric:  Affect, Mood, Insight appropriate        Assessment     1. Type 2 diabetes mellitus with other circulatory complication, with long-term current use of insulin (H)        Plan     Strongly encouraged to get out an move about the planet. We will consider increasing his Trulicity to 3 mg weekly if no improvement in his A1c with movement. F/u with me in 3 months.         Emily [facility] Endocrin[facility]  6/27/2021  6:59 AM          Lab Results     Hemoglobin A1c   Date Value Ref Range Status   06/18/2021 9.0 (H) <=5.6  % Final   10/02/2020 8.9 (H) <=5.6 % Final     Comment:     Normal <5.7% Prediabete 5.7-6.4% Diabletes 6.5% or higher - adopted from ADA consensus guidelines   03/27/2020 6.1 (H) 3.5 - 6.0 % Final   02/25/2020 6.7 (H) 4.2 - 6.1 % Final   03/28/2019 6.8 (H) 3.5 - 6.0 % Final     Creatinine   Date Value Ref Range Status   02/19/2021 1.11 0.70 - 1.30 mg/dL Final   10/02/2020 1.09 0.70 - 1.30 mg/dL Final   03/03/2020 1.04 0.70 - 1.30 mg/dL Final     Microalbumin, Random Urine   Date Value Ref Range Status   10/02/2020 17.87 (H) 0.00 - 1.99 mg/dL Final       Cholesterol   Date Value Ref Range Status   02/19/2021 123 <=199 mg/dL Final     HDL Cholesterol   Date Value Ref Range Status   02/19/2021 33 (L) >=40 mg/dL Final     LDL Calculated   Date Value Ref Range Status   02/19/2021 23 <=129 mg/dL Final     Triglycerides   Date Value Ref Range Status   02/19/2021 334 (H) <=149 mg/dL Final       Lab Results   Component Value Date    ALT 68 (H) 02/19/2021    AST 43 (H) 02/19/2021    ALKPHOS 134 (H) 02/19/2021    BILITOT 1.1 (H) 02/19/2021         Current Medications     Outpatient Medications Prior to Visit   Medication Sig Dispense Refill   ? amLODIPine (NORVASC) 10 MG tablet 1 tab(s)     ? fluticasone propion-salmeteroL (ADVAIR DISKUS) 250-50 mcg/dose DISKUS 1 INH     ? INSULIN PUMP CARTRIDGE SUBQ Inject under the skin.     ? acetaminophen (TYLENOL) 325 MG tablet Take 2 tablets (650 mg total) by mouth every 4 (four) hours as needed.  0   ? ADVAIR DISKUS 250-50 mcg/dose DISKUS Inhale 1 puff 2 (two) times a day.     ? aspirin 81 MG EC tablet Take 81 mg by mouth daily.     ? atorvastatin (LIPITOR) 80 MG tablet TAKE 1 TABLET BY MOUTH DAILY 90 tablet 1   ? COMBIVENT RESPIMAT  mcg/actuation Mist inhaler INHALE 1 PUFF BY MOUTH 4 TIMES A DAY AS NEEDED  3   ? dulaglutide (TRULICITY) 1.5 mg/0.5 mL PnIj Inject 1 mL under the skin once a week. 12 Syringe 0   ? dulaglutide 3 mg/0.5 mL PnIj Inject 3 mg under the skin once a week. 6  mL 0   ? flash glucose scanning reader (FREESTYLE ANA M 14 DAY READER) Misc Use 1 each As Directed every 14 (fourteen) days. 2 each 3   ? FREESTYLE ANA M 14 DAY SENSOR Kit USE 1 EACH AS DIRECTED EVERY 14 (FOURTEEN) DAYS. 6 kit 1   ? furosemide (LASIX) 40 MG tablet Take 1 tablet by mouth 2 (two) times a day.      ? insulin aspart U-100 (NOVOLOG U-100 INSULIN ASPART) 100 unit/mL injection inject 375 units of insulin daily via the pump 340 mL 0   ? insulin pump cartridge Crtg Inject 4 Units/hr under the skin continuous. Bolus average 15-20 units before meals (depends on meal)   1:20 insulin to carb ratio  1 unit insulin to lower glucose by 5mg/dl 1 Cartridge 0   ? insulin pump cartridge Inject under the skin continuous. Insulin pump discharge instructions from 03/03/20.  This is intended as additional information to the patient's PTA insulin pump order.  Continue with insulin pump at present settings. 1 each 0   ? lisinopriL (PRINIVIL,ZESTRIL) 20 MG tablet Take 1 tablet (20 mg total) by mouth 2 (two) times a day. 180 tablet 3   ? metoprolol tartrate (LOPRESSOR) 100 MG tablet TAKE 1 TABLET BY MOUTH TWICE A  tablet 2   ? multivitamin (ONE A DAY) per tablet Take 1 tablet by mouth daily.     ? nitroglycerin (NITROSTAT) 0.4 MG SL tablet Place 1 tablet (0.4 mg total) under the tongue every 5 (five) minutes as needed for chest pain (chest discomfort). Take 1 tablet sublingual for chest symptoms and allow to dissolve under tongue without swallowing.  If symptoms do not resolve in 5 minutes, repeat dose and contact EMS by calling 911.  Continue to repeat dose sublingual every 5 minutes for total 4 doses. 1 Bottle 0   ? omeprazole (PRILOSEC) 20 MG capsule Take 20 mg by mouth daily.     ? triamcinolone (KENALOG) 0.025 % cream Apply 1 application topically 3 (three) times a day as needed.        No facility-administered medications prior to visit.            Video-Visit Details    Type of service:  Video Visit    Video End  Time (time video stopped): 0920  Originating Location (pt. Location): Home    Distant Location (provider location):  [facility]     Platform used for Video Visit: Panchito    Date of last OV: 2/23/21  Reason for Visit: DM      Blood Glucose Log: [email or web address] Invite sent.  Patient will email [email or web address] report.  Tandem Pump:

## 2021-08-14 DIAGNOSIS — E11.9 DIABETES MELLITUS (H): ICD-10-CM

## 2021-08-15 DIAGNOSIS — E11.59 TYPE 2 DIABETES MELLITUS WITH OTHER CIRCULATORY COMPLICATION, WITH LONG-TERM CURRENT USE OF INSULIN (H): ICD-10-CM

## 2021-08-15 DIAGNOSIS — Z79.4 TYPE 2 DIABETES MELLITUS WITH OTHER CIRCULATORY COMPLICATION, WITH LONG-TERM CURRENT USE OF INSULIN (H): ICD-10-CM

## 2021-08-16 RX ORDER — FLASH GLUCOSE SENSOR
KIT MISCELLANEOUS
Qty: 2 EACH | Refills: 1 | Status: SHIPPED | OUTPATIENT
Start: 2021-08-16 | End: 2022-02-28

## 2021-09-07 DIAGNOSIS — I10 ESSENTIAL HYPERTENSION: ICD-10-CM

## 2021-09-07 RX ORDER — LISINOPRIL 20 MG/1
20 TABLET ORAL 2 TIMES DAILY
Qty: 180 TABLET | Refills: 3 | Status: SHIPPED | OUTPATIENT
Start: 2021-09-07

## 2021-09-20 ENCOUNTER — MEDICAL CORRESPONDENCE (OUTPATIENT)
Dept: HEALTH INFORMATION MANAGEMENT | Facility: CLINIC | Age: 71
End: 2021-09-20

## 2021-09-20 ENCOUNTER — LAB (OUTPATIENT)
Dept: LAB | Facility: CLINIC | Age: 71
End: 2021-09-20
Payer: COMMERCIAL

## 2021-09-20 DIAGNOSIS — E11.59 TYPE 2 DIABETES MELLITUS WITH OTHER CIRCULATORY COMPLICATION, WITH LONG-TERM CURRENT USE OF INSULIN (H): ICD-10-CM

## 2021-09-20 DIAGNOSIS — Z79.4 TYPE 2 DIABETES MELLITUS WITH OTHER CIRCULATORY COMPLICATION, WITH LONG-TERM CURRENT USE OF INSULIN (H): ICD-10-CM

## 2021-09-20 LAB — HBA1C MFR BLD: 8.8 % (ref 0–5.6)

## 2021-09-20 PROCEDURE — 36415 COLL VENOUS BLD VENIPUNCTURE: CPT

## 2021-09-20 PROCEDURE — 83036 HEMOGLOBIN GLYCOSYLATED A1C: CPT

## 2021-09-24 ENCOUNTER — VIRTUAL VISIT (OUTPATIENT)
Dept: ENDOCRINOLOGY | Facility: CLINIC | Age: 71
End: 2021-09-24
Payer: COMMERCIAL

## 2021-09-24 DIAGNOSIS — E11.59 TYPE 2 DIABETES MELLITUS WITH OTHER CIRCULATORY COMPLICATION, WITH LONG-TERM CURRENT USE OF INSULIN (H): ICD-10-CM

## 2021-09-24 DIAGNOSIS — Z79.4 TYPE 2 DIABETES MELLITUS WITH OTHER CIRCULATORY COMPLICATION, WITH LONG-TERM CURRENT USE OF INSULIN (H): ICD-10-CM

## 2021-09-24 PROCEDURE — 95251 CONT GLUC MNTR ANALYSIS I&R: CPT | Performed by: NURSE PRACTITIONER

## 2021-09-24 PROCEDURE — 99214 OFFICE O/P EST MOD 30 MIN: CPT | Mod: GT | Performed by: NURSE PRACTITIONER

## 2021-09-24 RX ORDER — FLASH GLUCOSE SENSOR
1 KIT MISCELLANEOUS
Qty: 6 EACH | Refills: 1 | Status: SHIPPED | OUTPATIENT
Start: 2021-09-24 | End: 2021-12-08

## 2021-09-24 NOTE — PROGRESS NOTES
Alvino is a 70 year old who is being evaluated via a billable video visit.      How would you like to obtain your AVS? MyChart  If the video visit is dropped, the invitation should be resent by: Text to cell phone: [phone]  Will anyone else be joining your video visit? No      Video Start Time: 0930    [facility] ENDOCRINOLOGY    Diabetes Note 9/27/2021    Walt Lambert, 1950, [id number]          Reason for visit      1. Diabetes mellitus (H)        HPI     Walt Lambert is a very pleasant 70 year old old male who presents for follow up.  SUMMARY:    Alvino is contacted today via Video Visit in f/u for DM 2. His current A1c is 8.8 and down from his last at 9.0. His control has traditionally been much better with A1c well below 7. Change from a lot of movement to mostly none, his control has been collateral damage from COVID shut down. He continues to use the Ivelisse and T-Slim insulin pump and download and his last 2 weeks shows projected A1c of 5.9. He was within range 88% of the time. He had hypoglycemia 7% of the time, unfortunately. He did not require outside assistance. He does report that his family was rocked with the murder of his granddaughter in the last several weeks and that they aren't dealing well. Certainly a source of extreme stress.       Blood glucose data:      Past Medical History     Patient Active Problem List   Diagnosis     Diabetes mellitus (H)     Morbid obesity (H)     Cardiomyopathy (H)     Chronic obstructive pulmonary disease (H)     Hyperlipidemia     Hypertension     Sleep apnea     Insulin pump status     MAHMOOD (dyspnea on exertion)     Abnormal cardiac CT angiography     CAD (coronary artery disease)     S/P CABG (coronary artery bypass graft)     ARF (acute respiratory failure) (H)     Anemia due to blood loss, acute     Postoperative pain     Arteriosclerosis of coronary artery bypass graft     Body mass index (BMI) 50.0-59.9, adult     Pure hypertriglyceridemia      Tobacco user     Peripheral venous insufficiency     Lymphedema        Family History       family history includes Colon Cancer in his sister; Heart Failure in his mother; Leukemia in his father and mother.    Social History      reports that he has quit smoking. He has never used smokeless tobacco. He reports current alcohol use. He reports that he does not use drugs.      Review of Systems     Patient has no polyuria or polydipsia, no chest pain, dyspnea or TIA's, no numbness, tingling or pain in extremities  Remainder negative except as noted in HPI.      Vital Signs     There were no vitals taken for this visit.  Wt Readings from Last 3 Encounters:   09/17/20 (!) 156.5 kg (345 lb)   06/22/20 (!) 154.1 kg (339 lb 11.2 oz)   06/17/20 (!) 155 kg (341 lb 12.8 oz)       Physical Exam     Constitutional:  Well developed, Well nourished  HENT:  Normocephalic,   Neck: normal in appearance  Eyes:  PERRL, Conjunctiva pink  Respiratory:  No respiratory distress  Skin: No acanthosis nigricans, lipoatrophy or lipodystrophy  Neurologic:  Alert & oriented x 3, nonfocal  Psychiatric:  Affect, Mood, Insight appropriate          Assessment     1. Diabetes mellitus (H)        Plan     Alvino's control is improving. Encouraged to continue to pay attention and to use his technology to keep ahead of his BG. He will f/u with me in 6 months.      Emily Alfred NP  [facility]  9/27/2021  9:01 AM      Lab Results     Microalbumin Urine mg/dL   Date Value Ref Range Status   10/02/2020 17.87 (H) 0.00 - 1.99 mg/dL Final       Cholesterol   Date Value Ref Range Status   02/19/2021 123 <=199 mg/dL Final     Direct Measure HDL   Date Value Ref Range Status   02/19/2021 33 (L) >=40 mg/dL Final     Triglycerides   Date Value Ref Range Status   02/19/2021 334 (H) <=149 mg/dL Final       [unfilled]      Current Medications     Outpatient Medications Prior to Visit   Medication Sig Dispense Refill     Dulaglutide 3 MG/0.5ML SOPN Inject 3 mg  Subcutaneous       acetaminophen (TYLENOL) 325 MG tablet [ACETAMINOPHEN (TYLENOL) 325 MG TABLET] Take 2 tablets (650 mg total) by mouth every 4 (four) hours as needed.  0     ADVAIR DISKUS 250-50 mcg/dose DISKUS [ADVAIR DISKUS 250-50 MCG/DOSE DISKUS] Inhale 1 puff 2 (two) times a day.       amLODIPine (NORVASC) 10 MG tablet [AMLODIPINE (NORVASC) 10 MG TABLET] 1 tab(s)       aspirin 81 MG EC tablet [ASPIRIN 81 MG EC TABLET] Take 81 mg by mouth daily.       atorvastatin (LIPITOR) 80 MG tablet [ATORVASTATIN (LIPITOR) 80 MG TABLET] TAKE 1 TABLET BY MOUTH DAILY 90 tablet 1     budesonide-formoterol (SYMBICORT) 160-4.5 MCG/ACT Inhaler Inhale 2 puffs into the lungs       COMBIVENT RESPIMAT  mcg/actuation Mist inhaler [COMBIVENT RESPIMAT  MCG/ACTUATION MIST INHALER] INHALE 1 PUFF BY MOUTH 4 TIMES A DAY AS NEEDED  3     Continuous Blood Gluc Sensor (FREESTYLE ANA M 14 DAY SENSOR) [facility] [place] 1 EACH AS DIRECTED EVERY 14 (FOURTEEN) DAYS. 2 each 1     Digital Therapy (HELLO HEART BLOOD PRESSURE) KIT        dulaglutide 3 mg/0.5 mL PnIj [DULAGLUTIDE 3 MG/0.5 ML PNIJ] Inject 3 mg under the skin once a week. 6 mL 1     fluticasone propion-salmeteroL (ADVAIR DISKUS) 250-50 mcg/dose DISKUS [FLUTICASONE PROPION-SALMETEROL (ADVAIR DISKUS) 250-50 MCG/DOSE DISKUS] 1 INH       furosemide (LASIX) 40 MG tablet [FUROSEMIDE (LASIX) 40 MG TABLET] Take 1 tablet by mouth 2 (two) times a day.        INSULIN PUMP CARTRIDGE SUBQ [INSULIN PUMP CARTRIDGE SUBQ] Inject under the skin.       insulin pump cartridge [INSULIN PUMP CARTRIDGE] Inject under the skin continuous. Insulin pump discharge instructions from 03/03/20.  This is intended as additional information to the patient's PTA insulin pump order.  Continue with insulin pump at present settings. 1 each 0     lisinopril (ZESTRIL) 20 MG tablet Take 1 tablet (20 mg) by mouth 2 times daily 180 tablet 3     metoprolol tartrate (LOPRESSOR) 100 MG tablet [METOPROLOL TARTRATE (LOPRESSOR) 100 MG  TABLET] TAKE 1 TABLET BY MOUTH TWICE A  tablet 2     multivitamin (ONE A DAY) per tablet [MULTIVITAMIN (ONE A DAY) PER TABLET] Take 1 tablet by mouth daily.       nitroglycerin (NITROSTAT) 0.4 MG SL tablet [NITROGLYCERIN (NITROSTAT) 0.4 MG SL TABLET] Place 1 tablet (0.4 mg total) under the tongue every 5 (five) minutes as needed for chest pain (chest discomfort). Take 1 tablet sublingual for chest symptoms and allow to dissolve under tongue without swallowing.  If symptoms do not resolve in 5 minutes, repeat dose and contact EMS by calling 911.  Continue to repeat dose sublingual every 5 minutes for total 4 doses. 1 Bottle 0     NOVOLOG VIAL 100 UNIT/ML soln INJECT 375 UNITS OF INSULIN DAILY VIA THE PUMP 340 mL 0     omeprazole (PRILOSEC) 20 MG capsule [OMEPRAZOLE (PRILOSEC) 20 MG CAPSULE] Take 20 mg by mouth daily.       simvastatin (ZOCOR) 20 MG tablet Take 20 mg by mouth       triamcinolone (KENALOG) 0.025 % cream [TRIAMCINOLONE (KENALOG) 0.025 % CREAM] Apply 1 application topically 3 (three) times a day as needed.        dulaglutide (TRULICITY) 1.5 mg/0.5 mL PnIj [DULAGLUTIDE (TRULICITY) 1.5 MG/0.5 ML PNIJ] Inject 1 mL under the skin once a week. 12 Syringe 0     flash glucose scanning reader (FREESTYLE ANA M 14 DAY READER) Misc [FLASH GLUCOSE SCANNING READER (FREESTYLE ANA M 14 DAY READER) MISC] Use 1 each As Directed every 14 (fourteen) days. 2 each 3     No facility-administered medications prior to visit.             Video-Visit Details    Type of service:  Video Visit    Video End Time: 0950  Originating Location (pt. Location): Home    Distant Location (provider location):  [facility]     Platform used for Video Visit: Lindsey

## 2021-09-24 NOTE — LETTER
9/24/2021         RE: Walt Lambert  [address] [place] [id number]        Dear Colleague,    Thank you for referring your patient, Walt Lambert, to the [facility]. Please see a copy of my visit note below.    Alvino is a 70 year old who is being evaluated via a billable video visit.      How would you like to obtain your AVS? MyChart  If the video visit is dropped, the invitation should be resent by: Text to cell phone: [phone]  Will anyone else be joining your video visit? No      Video Start Time: 0930    [facility] ENDOCRINOLOGY    Diabetes Note 9/27/2021    Walt Lambert, 1950, [id number]          Reason for visit      1. Diabetes mellitus (H)        HPI     Walt Lambert is a very pleasant 70 year old old male who presents for follow up.  SUMMARY:    Alvino is contacted today via Video Visit in f/u for DM 2. His current A1c is 8.8 and down from his last at 9.0. His control has traditionally been much better with A1c well below 7. Change from a lot of movement to mostly none, his control has been collateral damage from COVID shut down. He continues to use the Iveilsse and T-Slim insulin pump and download and his last 2 weeks shows projected A1c of 5.9. He was within range 88% of the time. He had hypoglycemia 7% of the time, unfortunately. He did not require outside assistance. He does report that his family was rocked with the murder of his granddaughter in the last several weeks and that they aren't dealing well. Certainly a source of extreme stress.       Blood glucose data:      Past Medical History     Patient Active Problem List   Diagnosis     Diabetes mellitus (H)     Morbid obesity (H)     Cardiomyopathy (H)     Chronic obstructive pulmonary disease (H)     Hyperlipidemia     Hypertension     Sleep apnea     Insulin pump status     MAHMOOD (dyspnea on exertion)     Abnormal cardiac CT angiography     CAD (coronary artery disease)      S/P CABG (coronary artery bypass graft)     ARF (acute respiratory failure) (H)     Anemia due to blood loss, acute     Postoperative pain     Arteriosclerosis of coronary artery bypass graft     Body mass index (BMI) 50.0-59.9, adult     Pure hypertriglyceridemia     Tobacco user     Peripheral venous insufficiency     Lymphedema        Family History       family history includes Colon Cancer in his sister; Heart Failure in his mother; Leukemia in his father and mother.    Social History      reports that he has quit smoking. He has never used smokeless tobacco. He reports current alcohol use. He reports that he does not use drugs.      Review of Systems     Patient has no polyuria or polydipsia, no chest pain, dyspnea or TIA's, no numbness, tingling or pain in extremities  Remainder negative except as noted in HPI.      Vital Signs     There were no vitals taken for this visit.  Wt Readings from Last 3 Encounters:   09/17/20 (!) 156.5 kg (345 lb)   06/22/20 (!) 154.1 kg (339 lb 11.2 oz)   06/17/20 (!) 155 kg (341 lb 12.8 oz)       Physical Exam     Constitutional:  Well developed, Well nourished  HENT:  Normocephalic,   Neck: normal in appearance  Eyes:  PERRL, Conjunctiva pink  Respiratory:  No respiratory distress  Skin: No acanthosis nigricans, lipoatrophy or lipodystrophy  Neurologic:  Alert & oriented x 3, nonfocal  Psychiatric:  Affect, Mood, Insight appropriate          Assessment     1. Diabetes mellitus (H)        Plan     Alvino's control is improving. Encouraged to continue to pay attention and to use his technology to keep ahead of his BG. He will f/u with me in 6 months.      Emily Alfred NP  [facility]  9/27/2021  9:01 AM      Lab Results     Microalbumin Urine mg/dL   Date Value Ref Range Status   10/02/2020 17.87 (H) 0.00 - 1.99 mg/dL Final       Cholesterol   Date Value Ref Range Status   02/19/2021 123 <=199 mg/dL Final     Direct Measure HDL   Date Value Ref Range Status   02/19/2021  33 (L) >=40 mg/dL Final     Triglycerides   Date Value Ref Range Status   02/19/2021 334 (H) <=149 mg/dL Final       [unfilled]      Current Medications     Outpatient Medications Prior to Visit   Medication Sig Dispense Refill     Dulaglutide 3 MG/0.5ML SOPN Inject 3 mg Subcutaneous       acetaminophen (TYLENOL) 325 MG tablet [ACETAMINOPHEN (TYLENOL) 325 MG TABLET] Take 2 tablets (650 mg total) by mouth every 4 (four) hours as needed.  0     ADVAIR DISKUS 250-50 mcg/dose DISKUS [ADVAIR DISKUS 250-50 MCG/DOSE DISKUS] Inhale 1 puff 2 (two) times a day.       amLODIPine (NORVASC) 10 MG tablet [AMLODIPINE (NORVASC) 10 MG TABLET] 1 tab(s)       aspirin 81 MG EC tablet [ASPIRIN 81 MG EC TABLET] Take 81 mg by mouth daily.       atorvastatin (LIPITOR) 80 MG tablet [ATORVASTATIN (LIPITOR) 80 MG TABLET] TAKE 1 TABLET BY MOUTH DAILY 90 tablet 1     budesonide-formoterol (SYMBICORT) 160-4.5 MCG/ACT Inhaler Inhale 2 puffs into the lungs       COMBIVENT RESPIMAT  mcg/actuation Mist inhaler [COMBIVENT RESPIMAT  MCG/ACTUATION MIST INHALER] INHALE 1 PUFF BY MOUTH 4 TIMES A DAY AS NEEDED  3     Continuous Blood Gluc Sensor (FREESTYLE ANA M 14 DAY SENSOR) [facility] [place] USE 1 EACH AS DIRECTED EVERY 14 (FOURTEEN) DAYS. 2 each 1     Digital Therapy (HELLO HEART BLOOD PRESSURE) KIT        dulaglutide 3 mg/0.5 mL PnIj [DULAGLUTIDE 3 MG/0.5 ML PNIJ] Inject 3 mg under the skin once a week. 6 mL 1     fluticasone propion-salmeteroL (ADVAIR DISKUS) 250-50 mcg/dose DISKUS [FLUTICASONE PROPION-SALMETEROL (ADVAIR DISKUS) 250-50 MCG/DOSE DISKUS] 1 INH       furosemide (LASIX) 40 MG tablet [FUROSEMIDE (LASIX) 40 MG TABLET] Take 1 tablet by mouth 2 (two) times a day.        INSULIN PUMP CARTRIDGE SUBQ [INSULIN PUMP CARTRIDGE SUBQ] Inject under the skin.       insulin pump cartridge [INSULIN PUMP CARTRIDGE] Inject under the skin continuous. Insulin pump discharge instructions from 03/03/20.  This is intended as additional information to the  patient's PTA insulin pump order.  Continue with insulin pump at present settings. 1 each 0     lisinopril (ZESTRIL) 20 MG tablet Take 1 tablet (20 mg) by mouth 2 times daily 180 tablet 3     metoprolol tartrate (LOPRESSOR) 100 MG tablet [METOPROLOL TARTRATE (LOPRESSOR) 100 MG TABLET] TAKE 1 TABLET BY MOUTH TWICE A  tablet 2     multivitamin (ONE A DAY) per tablet [MULTIVITAMIN (ONE A DAY) PER TABLET] Take 1 tablet by mouth daily.       nitroglycerin (NITROSTAT) 0.4 MG SL tablet [NITROGLYCERIN (NITROSTAT) 0.4 MG SL TABLET] Place 1 tablet (0.4 mg total) under the tongue every 5 (five) minutes as needed for chest pain (chest discomfort). Take 1 tablet sublingual for chest symptoms and allow to dissolve under tongue without swallowing.  If symptoms do not resolve in 5 minutes, repeat dose and contact EMS by calling 911.  Continue to repeat dose sublingual every 5 minutes for total 4 doses. 1 Bottle 0     NOVOLOG VIAL 100 UNIT/ML soln INJECT 375 UNITS OF INSULIN DAILY VIA THE PUMP 340 mL 0     omeprazole (PRILOSEC) 20 MG capsule [OMEPRAZOLE (PRILOSEC) 20 MG CAPSULE] Take 20 mg by mouth daily.       simvastatin (ZOCOR) 20 MG tablet Take 20 mg by mouth       triamcinolone (KENALOG) 0.025 % cream [TRIAMCINOLONE (KENALOG) 0.025 % CREAM] Apply 1 application topically 3 (three) times a day as needed.        dulaglutide (TRULICITY) 1.5 mg/0.5 mL PnIj [DULAGLUTIDE (TRULICITY) 1.5 MG/0.5 ML PNIJ] Inject 1 mL under the skin once a week. 12 Syringe 0     flash glucose scanning reader (FREESTYLE ANA M 14 DAY READER) Misc [FLASH GLUCOSE SCANNING READER (FREESTYLE ANA M 14 DAY READER) MISC] Use 1 each As Directed every 14 (fourteen) days. 2 each 3     No facility-administered medications prior to visit.             Video-Visit Details    Type of service:  Video Visit    Video End Time: 0074  Originating Location (pt. Location): Home    Distant Location (provider location):  [facility] [place]      Platform used for Video Visit: Lindsey               Again, thank you for allowing me to participate in the care of your patient.        Sincerely,        Emily Alfred NP

## 2021-09-27 PROBLEM — I89.0 LYMPHEDEMA: Status: ACTIVE | Noted: 2021-09-27

## 2021-09-27 RX ORDER — SIMVASTATIN 20 MG
20 TABLET ORAL
COMMUNITY
End: 2024-03-01

## 2021-09-27 RX ORDER — BUDESONIDE AND FORMOTEROL FUMARATE DIHYDRATE 160; 4.5 UG/1; UG/1
2 AEROSOL RESPIRATORY (INHALATION) DAILY PRN
COMMUNITY
End: 2021-12-08

## 2021-09-29 DIAGNOSIS — Z95.1 S/P CABG (CORONARY ARTERY BYPASS GRAFT): ICD-10-CM

## 2021-09-29 RX ORDER — ATORVASTATIN CALCIUM 80 MG/1
80 TABLET, FILM COATED ORAL DAILY
Qty: 90 TABLET | Refills: 0 | Status: SHIPPED | OUTPATIENT
Start: 2021-09-29 | End: 2021-10-26

## 2021-09-29 RX ORDER — METOPROLOL TARTRATE 100 MG
100 TABLET ORAL 2 TIMES DAILY
Qty: 180 TABLET | Refills: 0 | Status: SHIPPED | OUTPATIENT
Start: 2021-09-29 | End: 2021-10-26

## 2021-10-26 ENCOUNTER — OFFICE VISIT (OUTPATIENT)
Dept: CARDIOLOGY | Facility: CLINIC | Age: 71
End: 2021-10-26
Payer: COMMERCIAL

## 2021-10-26 VITALS
HEART RATE: 96 BPM | SYSTOLIC BLOOD PRESSURE: 128 MMHG | DIASTOLIC BLOOD PRESSURE: 74 MMHG | WEIGHT: 315 LBS | BODY MASS INDEX: 45.1 KG/M2 | HEIGHT: 70 IN | RESPIRATION RATE: 20 BRPM

## 2021-10-26 DIAGNOSIS — E11.59 TYPE 2 DIABETES MELLITUS WITH OTHER CIRCULATORY COMPLICATION, WITH LONG-TERM CURRENT USE OF INSULIN (H): ICD-10-CM

## 2021-10-26 DIAGNOSIS — Z91.89 SEDENTARY LIFESTYLE: ICD-10-CM

## 2021-10-26 DIAGNOSIS — Z95.1 S/P CABG (CORONARY ARTERY BYPASS GRAFT): ICD-10-CM

## 2021-10-26 DIAGNOSIS — E66.01 MORBID OBESITY (H): ICD-10-CM

## 2021-10-26 DIAGNOSIS — I25.10 CORONARY ARTERY DISEASE INVOLVING NATIVE CORONARY ARTERY OF NATIVE HEART WITHOUT ANGINA PECTORIS: Primary | ICD-10-CM

## 2021-10-26 DIAGNOSIS — I10 ESSENTIAL HYPERTENSION: ICD-10-CM

## 2021-10-26 DIAGNOSIS — E78.2 MIXED HYPERLIPIDEMIA: ICD-10-CM

## 2021-10-26 DIAGNOSIS — Z79.4 TYPE 2 DIABETES MELLITUS WITH OTHER CIRCULATORY COMPLICATION, WITH LONG-TERM CURRENT USE OF INSULIN (H): ICD-10-CM

## 2021-10-26 PROCEDURE — 99214 OFFICE O/P EST MOD 30 MIN: CPT | Performed by: INTERNAL MEDICINE

## 2021-10-26 RX ORDER — ATORVASTATIN CALCIUM 80 MG/1
80 TABLET, FILM COATED ORAL DAILY
Qty: 90 TABLET | Refills: 3 | Status: SHIPPED | OUTPATIENT
Start: 2021-10-26 | End: 2022-11-08

## 2021-10-26 RX ORDER — METOPROLOL TARTRATE 100 MG
100 TABLET ORAL 2 TIMES DAILY
Qty: 180 TABLET | Refills: 3 | Status: SHIPPED | OUTPATIENT
Start: 2021-10-26 | End: 2022-11-08

## 2021-10-26 ASSESSMENT — MIFFLIN-ST. JEOR: SCORE: 2437.32

## 2021-10-26 NOTE — LETTER
10/26/2021    Piyush Chung MD  [facility] [address]  [place] [address]    RE: Walt Lambert       Dear Colleague,    I had the pleasure of seeing Walt Lambert in the [facility].        Thank you, Piyush Reeves, for asking the [facility] team to see Mr. Walt Lambert to evaluate Follow Up (coronary artery disease)        Assessment/Recommendations   Assessment:    1. Coronary artery disease s/p CABG (LIMA to LAD, L radial artery to PDA, SVG to OM and SVG to Diagonal branch) by Dr. Russell on 2/26/2020 - stable without angina.  2. Hypertension - BP is well-controlled.  3. Hyperlipidemia - on appropriate statin  4. Morbid obesity - uncontrolled  5. Insulin dependent DMII - not optimally controlled with A1C of 8.8  6. Sedentary lifestyle    Plan:  1. Continue medications as prescribed  2. Discussed and recommended lifestyle changes to help improve diabetes control and weight loss.  3. Follow up in 1 year or sooner if needed.         History of Present Illness   Mr. Walt Lambert is a 70 year old male who presents for follow-up of his coronary artery disease.     Mr. Lambert had a coronary artery bypass graft surgery in early February, 2020. This was performed after a stress test ordered for dyspnea on exertion was suggestive of ischemia vs artifact and a CTA coronary angiogram revealed severely elevated CAC and multivessel CAD.    Today, Mr. Lambert reports no new physical complaints. His weight has increased by 20 lbs over the past year. He denies anginal symptoms and reports that his exertional tolerance is improved from prior to his CABG.    Other than noted above, Mr. Lambert denies any chest pain/pressure/tightness, shortness of breath at rest or with exertion, light headedness/dizziness, pre-syncope, syncope, lower extremity swelling, palpitations, paroxysmal nocturnal dyspnea (PND), or  orthopnea.     Cardiac Problems and Cardiac Diagnostics     Most Recent Cardiac testing:    TTE 2/6/2020  1. Normal left ventricular size and systolic performance with a visually estimated ejection fraction of 65%.   2. There is mild to moderate concentric increase in left ventricular wall thickness.   3. No significant valvular heart disease is identified on this study.   4. Normal right ventricular size and systolic performance.     Stress test: dated 12/24/2019 revealed    The nuclear stress test is abnormal.     There is a small area of mild ischemia in the inferior segment(s) of the left ventricle. Cannot exclude artifact.     The left ventricular ejection fraction at stress is 61%.     A prior study was conducted on 3/10/2015.  This study has changes noted when compared with the prior study:  mild inferior wall ischemia is now present     CT coronary angiogram with CAC 2/6/2020  The patient's image quality is poor due to morbid obesity.  The contrast did not fill coronary artery well.     1.  The total Agatston calcium score is 3784. A calcium score in this range places the individual in the 100th percentile when compared to an age and gender matched control group and implies a very high risk of cardiac events in the next ten years.     2.  Mild disease in left main.     3.  Diffuse calcified three coronary arteries.  Due to poor image quality, cannot exclude severe stenosis in mid LAD, proximal LCX and mid RCA.     Recommend invasive coronary angiogram for further cardiac evaluation if clinically indicated.     Cardiac cath: from 2/19/2020 demonstrated     The left ventricular size is normal. The left ventricular systolic function is normal. LV systolic pressure is normal. LV end diastolic pressure is normal. There are no wall motion abnormalities in the left ventricle.    2nd Mrg lesion is 70% stenosed.    Ost LM lesion is 30% stenosed.    Ost LAD to Mid LAD lesion is 70% stenosed.    Mid LAD lesion is 50%  stenosed.    Mid RCA lesion is 75% stenosed.    Prox RCA lesion is 90% stenosed.    Mild aortic valve gradient, possible aortic stenosis         Medications  Allergies   Current Outpatient Medications   Medication Sig Dispense Refill     acetaminophen (TYLENOL) 325 MG tablet [ACETAMINOPHEN (TYLENOL) 325 MG TABLET] Take 2 tablets (650 mg total) by mouth every 4 (four) hours as needed.  0     ADVAIR DISKUS 250-50 mcg/dose DISKUS [ADVAIR DISKUS 250-50 MCG/DOSE DISKUS] Inhale 1 puff 2 (two) times a day.       amLODIPine (NORVASC) 10 MG tablet [AMLODIPINE (NORVASC) 10 MG TABLET] 1 tab(s)       aspirin 81 MG EC tablet [ASPIRIN 81 MG EC TABLET] Take 81 mg by mouth daily.       atorvastatin (LIPITOR) 80 MG tablet Take 1 tablet (80 mg) by mouth daily 90 tablet 3     budesonide-formoterol (SYMBICORT) 160-4.5 MCG/ACT Inhaler Inhale 2 puffs into the lungs daily as needed        Continuous Blood Gluc  (FREESTYLE ANA M READER) OLIVIA 1 each every 14 days 6 each 1     Continuous Blood Gluc Sensor (FREESTYLE ANA M 14 DAY SENSOR) [facility] USE 1 EACH AS DIRECTED EVERY 14 (FOURTEEN) DAYS. 2 each 1     Digital Therapy (HELLO HEART BLOOD PRESSURE) KIT        dulaglutide 3 mg/0.5 mL PnIj [DULAGLUTIDE 3 MG/0.5 ML PNIJ] Inject 3 mg under the skin once a week. 6 mL 1     Dulaglutide 3 MG/0.5ML SOPN Inject 3 mg Subcutaneous       fluticasone propion-salmeteroL (ADVAIR DISKUS) 250-50 mcg/dose DISKUS [FLUTICASONE PROPION-SALMETEROL (ADVAIR DISKUS) 250-50 MCG/DOSE DISKUS] 1 INH       furosemide (LASIX) 40 MG tablet [FUROSEMIDE (LASIX) 40 MG TABLET] Take 1 tablet by mouth 2 (two) times a day.        INSULIN PUMP CARTRIDGE SUBQ [INSULIN PUMP CARTRIDGE SUBQ] Inject under the skin.       insulin pump cartridge [INSULIN PUMP CARTRIDGE] Inject under the skin continuous. Insulin pump discharge instructions from 03/03/20.  This is intended as additional information to the patient's PTA insulin pump order.  Continue with insulin pump at present  "settings. 1 each 0     lisinopril (ZESTRIL) 20 MG tablet Take 1 tablet (20 mg) by mouth 2 times daily 180 tablet 3     metoprolol tartrate (LOPRESSOR) 100 MG tablet Take 1 tablet (100 mg) by mouth 2 times daily 180 tablet 3     multivitamin (ONE A DAY) per tablet [MULTIVITAMIN (ONE A DAY) PER TABLET] Take 1 tablet by mouth daily.       nitroglycerin (NITROSTAT) 0.4 MG SL tablet [NITROGLYCERIN (NITROSTAT) 0.4 MG SL TABLET] Place 1 tablet (0.4 mg total) under the tongue every 5 (five) minutes as needed for chest pain (chest discomfort). Take 1 tablet sublingual for chest symptoms and allow to dissolve under tongue without swallowing.  If symptoms do not resolve in 5 minutes, repeat dose and contact EMS by calling 911.  Continue to repeat dose sublingual every 5 minutes for total 4 doses. 1 Bottle 0     NOVOLOG VIAL 100 UNIT/ML soln INJECT 375 UNITS OF INSULIN DAILY VIA THE PUMP 340 mL 0     omeprazole (PRILOSEC) 20 MG capsule [OMEPRAZOLE (PRILOSEC) 20 MG CAPSULE] Take 20 mg by mouth daily.       simvastatin (ZOCOR) 20 MG tablet Take 20 mg by mouth       triamcinolone (KENALOG) 0.025 % cream [TRIAMCINOLONE (KENALOG) 0.025 % CREAM] Apply 1 application topically 3 (three) times a day as needed.        COMBIVENT RESPIMAT  mcg/actuation Mist inhaler [COMBIVENT RESPIMAT  MCG/ACTUATION MIST INHALER] INHALE 1 PUFF BY MOUTH 4 TIMES A DAY AS NEEDED (Patient not taking: Reported on 10/26/2021)  3      No Known Allergies     Physical Examination Review of Systems   Vitals: /74 (BP Location: Left arm, Patient Position: Sitting, Cuff Size: Adult Large)   Pulse 96   Resp 20   Ht 1.778 m (5' 10\")   Wt (!) 166.1 kg (366 lb 3.2 oz)   BMI 52.54 kg/m    BMI= Body mass index is 52.54 kg/m .  Wt Readings from Last 3 Encounters:   10/26/21 (!) 166.1 kg (366 lb 3.2 oz)   09/17/20 (!) 156.5 kg (345 lb)   06/22/20 (!) 154.1 kg (339 lb 11.2 oz)       General Appearance:   Pleasant male, appears stated age. no acute " distress, morbidly obese body habitus   ENT/Mouth: membranes moist, no apparent gingival bleeding.      EYES:  no scleral icterus, normal conjunctivae   Neck: no carotid bruits. supple   Respiratory:   lungs are clear to auscultation, no rales or wheezing, equal chest wall expansion    Cardiovascular:   Regular rhythm, normal rate. Normal first and second heart sounds with no murmurs, rubs, or gallops; Jugular venous pressure normal, no edema bilaterally    Abdomen/GI:  Soft, non-tender   Extremities: no cyanosis or clubbing   Skin: no xanthelasma, warm.    Heme/lymph/ Immunology No apparent bleeding noted.   Neurologic: Alert and oriented. normal gait, no tremors   Psychiatric: Pleasant, calm, appropriate affect.         Please refer above for cardiac ROS details.       Past History   Past Medical History:   Past Medical History:   Diagnosis Date     Asthma      Chronic obstructive pulmonary disease (H) 9/29/2019     Diabetes mellitus (H)     insulin pump     Diabetic neuropathy (H)      GERD (gastroesophageal reflux disease)      Hyperlipidemia      Hypertension      Morbid obesity (H)      Sleep apnea     uses CPAP        Past Surgical History:   Past Surgical History:   Procedure Laterality Date     COLONOSCOPY N/A 9/20/2017    Procedure: COLONOSCOPY;  Surgeon: Michael Fagan MD;  Location: [facility];  Service:      CV CORONARY ANGIOGRAM N/A 2/19/2020    Procedure: Coronary Angiogram;  Surgeon: Daniel Hayden MD;  Location: [facility];  Service: Cardiology     CV LEFT HEART CATHETERIZATION WITH LEFT VENTRICULOGRAM N/A 2/19/2020    Procedure: Left Heart Catheterization with Left Ventriculogram;  Surgeon: Daniel Hayden MD;  Location: [facility];  Service: Cardiology     GALLBLADDER SURGERY          Family History:   Family History   Problem Relation Age of Onset     Leukemia Mother      Heart Failure Mother      Leukemia Father      Colon Cancer Sister         Social History:    Social History     Socioeconomic History     Marital status:      Spouse name: Not on file     Number of children: Not on file     Years of education: Not on file     Highest education level: Not on file   Occupational History     Not on file   Tobacco Use     Smoking status: Former Smoker     Smokeless tobacco: Never Used     Tobacco comment: quit 40 years ago   Substance and Sexual Activity     Alcohol use: Yes     Comment: Alcoholic Drinks/day: one beer every two weeks      Drug use: No     Sexual activity: Not on file   Other Topics Concern     Not on file   Social History Narrative     Not on file     Social Determinants of Health     Financial Resource Strain:      Difficulty of Paying Living Expenses:    Food Insecurity:      Worried About Running Out of Food in the Last Year:      Ran Out of Food in the Last Year:    Transportation Needs:      Lack of Transportation (Medical):      Lack of Transportation (Non-Medical):    Physical Activity:      Days of Exercise per Week:      Minutes of Exercise per Session:    Stress:      Feeling of Stress :    Social Connections:      Frequency of Communication with Friends and Family:      Frequency of Social Gatherings with Friends and Family:      Attends [employer] Services:      Active Member of Clubs or Organizations:      Attends Club or Organization Meetings:      Marital Status:    Intimate Partner Violence:      Fear of Current or Ex-Partner:      Emotionally Abused:      Physically Abused:      Sexually Abused:             Lab Results    Chemistry/lipid CBC Cardiac Enzymes/BNP/TSH/INR   Lab Results   Component Value Date    CHOL 123 02/19/2021    HDL 33 (L) 02/19/2021    TRIG 334 (H) 02/19/2021    BUN 24 (H) 02/19/2021     02/19/2021    CO2 23 02/19/2021    Lab Results   Component Value Date    WBC 10.7 03/03/2020    HGB 13.0 (L) 03/03/2020    HCT 41.6 03/03/2020    MCV 95 03/03/2020     03/03/2020    Lab Results   Component Value Date     INR 1.42 (H) 02/27/2020                Thank you for allowing me to participate in the care of your patient.      Sincerely,     Mitch Duncan MD     [facility]  cc:   Mitch Duncan MD  [address] [place] [address]

## 2021-10-26 NOTE — PROGRESS NOTES
Thank you, Piyush Reeves, for asking the [facility] team to see Mr. Walt Lambert to evaluate Follow Up (coronary artery disease)        Assessment/Recommendations   Assessment:    1. Coronary artery disease s/p CABG (LIMA to LAD, L radial artery to PDA, SVG to OM and SVG to Diagonal branch) by Dr. Russell on 2/26/2020 - stable without angina.  2. Hypertension - BP is well-controlled.  3. Hyperlipidemia - on appropriate statin  4. Morbid obesity - uncontrolled  5. Insulin dependent DMII - not optimally controlled with A1C of 8.8  6. Sedentary lifestyle    Plan:  1. Continue medications as prescribed  2. Discussed and recommended lifestyle changes to help improve diabetes control and weight loss.  3. Follow up in 1 year or sooner if needed.         History of Present Illness   Mr. Walt Lambert is a 70 year old male who presents for follow-up of his coronary artery disease.     Mr. Lambert had a coronary artery bypass graft surgery in early February, 2020. This was performed after a stress test ordered for dyspnea on exertion was suggestive of ischemia vs artifact and a CTA coronary angiogram revealed severely elevated CAC and multivessel CAD.    Today, Mr. Lambert reports no new physical complaints. His weight has increased by 20 lbs over the past year. He denies anginal symptoms and reports that his exertional tolerance is improved from prior to his CABG.    Other than noted above, Mr. Lambert denies any chest pain/pressure/tightness, shortness of breath at rest or with exertion, light headedness/dizziness, pre-syncope, syncope, lower extremity swelling, palpitations, paroxysmal nocturnal dyspnea (PND), or orthopnea.     Cardiac Problems and Cardiac Diagnostics     Most Recent Cardiac testing:    TTE 2/6/2020  1. Normal left ventricular size and systolic performance with a visually estimated ejection fraction of 65%.   2. There is mild to moderate concentric increase in left  ventricular wall thickness.   3. No significant valvular heart disease is identified on this study.   4. Normal right ventricular size and systolic performance.     Stress test: dated 12/24/2019 revealed    The nuclear stress test is abnormal.     There is a small area of mild ischemia in the inferior segment(s) of the left ventricle. Cannot exclude artifact.     The left ventricular ejection fraction at stress is 61%.     A prior study was conducted on 3/10/2015.  This study has changes noted when compared with the prior study:  mild inferior wall ischemia is now present     CT coronary angiogram with CAC 2/6/2020  The patient's image quality is poor due to morbid obesity.  The contrast did not fill coronary artery well.     1.  The total Agatston calcium score is 3784. A calcium score in this range places the individual in the 100th percentile when compared to an age and gender matched control group and implies a very high risk of cardiac events in the next ten years.     2.  Mild disease in left main.     3.  Diffuse calcified three coronary arteries.  Due to poor image quality, cannot exclude severe stenosis in mid LAD, proximal LCX and mid RCA.     Recommend invasive coronary angiogram for further cardiac evaluation if clinically indicated.     Cardiac cath: from 2/19/2020 demonstrated     The left ventricular size is normal. The left ventricular systolic function is normal. LV systolic pressure is normal. LV end diastolic pressure is normal. There are no wall motion abnormalities in the left ventricle.    2nd Mrg lesion is 70% stenosed.    Ost LM lesion is 30% stenosed.    Ost LAD to Mid LAD lesion is 70% stenosed.    Mid LAD lesion is 50% stenosed.    Mid RCA lesion is 75% stenosed.    Prox RCA lesion is 90% stenosed.    Mild aortic valve gradient, possible aortic stenosis         Medications  Allergies   Current Outpatient Medications   Medication Sig Dispense Refill     acetaminophen (TYLENOL) 325 MG tablet  [ACETAMINOPHEN (TYLENOL) 325 MG TABLET] Take 2 tablets (650 mg total) by mouth every 4 (four) hours as needed.  0     ADVAIR DISKUS 250-50 mcg/dose DISKUS [ADVAIR DISKUS 250-50 MCG/DOSE DISKUS] Inhale 1 puff 2 (two) times a day.       amLODIPine (NORVASC) 10 MG tablet [AMLODIPINE (NORVASC) 10 MG TABLET] 1 tab(s)       aspirin 81 MG EC tablet [ASPIRIN 81 MG EC TABLET] Take 81 mg by mouth daily.       atorvastatin (LIPITOR) 80 MG tablet Take 1 tablet (80 mg) by mouth daily 90 tablet 3     budesonide-formoterol (SYMBICORT) 160-4.5 MCG/ACT Inhaler Inhale 2 puffs into the lungs daily as needed        Continuous Blood Gluc  (FREESTYLE ANA M READER) OLIVIA 1 each every 14 days 6 each 1     Continuous Blood Gluc Sensor (FREESTYLE ANA M 14 DAY SENSOR) MISC USE 1 EACH AS DIRECTED EVERY 14 (FOURTEEN) DAYS. 2 each 1     Digital Therapy (HELLO HEART BLOOD PRESSURE) KIT        dulaglutide 3 mg/0.5 mL PnIj [DULAGLUTIDE 3 MG/0.5 ML PNIJ] Inject 3 mg under the skin once a week. 6 mL 1     Dulaglutide 3 MG/0.5ML SOPN Inject 3 mg Subcutaneous       fluticasone propion-salmeteroL (ADVAIR DISKUS) 250-50 mcg/dose DISKUS [FLUTICASONE PROPION-SALMETEROL (ADVAIR DISKUS) 250-50 MCG/DOSE DISKUS] 1 INH       furosemide (LASIX) 40 MG tablet [FUROSEMIDE (LASIX) 40 MG TABLET] Take 1 tablet by mouth 2 (two) times a day.        INSULIN PUMP CARTRIDGE SUBQ [INSULIN PUMP CARTRIDGE SUBQ] Inject under the skin.       insulin pump cartridge [INSULIN PUMP CARTRIDGE] Inject under the skin continuous. Insulin pump discharge instructions from 03/03/20.  This is intended as additional information to the patient's PTA insulin pump order.  Continue with insulin pump at present settings. 1 each 0     lisinopril (ZESTRIL) 20 MG tablet Take 1 tablet (20 mg) by mouth 2 times daily 180 tablet 3     metoprolol tartrate (LOPRESSOR) 100 MG tablet Take 1 tablet (100 mg) by mouth 2 times daily 180 tablet 3     multivitamin (ONE A DAY) per tablet [MULTIVITAMIN (ONE  "A DAY) PER TABLET] Take 1 tablet by mouth daily.       nitroglycerin (NITROSTAT) 0.4 MG SL tablet [NITROGLYCERIN (NITROSTAT) 0.4 MG SL TABLET] Place 1 tablet (0.4 mg total) under the tongue every 5 (five) minutes as needed for chest pain (chest discomfort). Take 1 tablet sublingual for chest symptoms and allow to dissolve under tongue without swallowing.  If symptoms do not resolve in 5 minutes, repeat dose and contact EMS by calling 911.  Continue to repeat dose sublingual every 5 minutes for total 4 doses. 1 Bottle 0     NOVOLOG VIAL 100 UNIT/ML soln INJECT 375 UNITS OF INSULIN DAILY VIA THE PUMP 340 mL 0     omeprazole (PRILOSEC) 20 MG capsule [OMEPRAZOLE (PRILOSEC) 20 MG CAPSULE] Take 20 mg by mouth daily.       simvastatin (ZOCOR) 20 MG tablet Take 20 mg by mouth       triamcinolone (KENALOG) 0.025 % cream [TRIAMCINOLONE (KENALOG) 0.025 % CREAM] Apply 1 application topically 3 (three) times a day as needed.        COMBIVENT RESPIMAT  mcg/actuation Mist inhaler [COMBIVENT RESPIMAT  MCG/ACTUATION MIST INHALER] INHALE 1 PUFF BY MOUTH 4 TIMES A DAY AS NEEDED (Patient not taking: Reported on 10/26/2021)  3      No Known Allergies     Physical Examination Review of Systems   Vitals: /74 (BP Location: Left arm, Patient Position: Sitting, Cuff Size: Adult Large)   Pulse 96   Resp 20   Ht 1.778 m (5' 10\")   Wt (!) 166.1 kg (366 lb 3.2 oz)   BMI 52.54 kg/m    BMI= Body mass index is 52.54 kg/m .  Wt Readings from Last 3 Encounters:   10/26/21 (!) 166.1 kg (366 lb 3.2 oz)   09/17/20 (!) 156.5 kg (345 lb)   06/22/20 (!) 154.1 kg (339 lb 11.2 oz)       General Appearance:   Pleasant male, appears stated age. no acute distress, morbidly obese body habitus   ENT/Mouth: membranes moist, no apparent gingival bleeding.      EYES:  no scleral icterus, normal conjunctivae   Neck: no carotid bruits. supple   Respiratory:   lungs are clear to auscultation, no rales or wheezing, equal chest wall expansion  "   Cardiovascular:   Regular rhythm, normal rate. Normal first and second heart sounds with no murmurs, rubs, or gallops; Jugular venous pressure normal, no edema bilaterally    Abdomen/GI:  Soft, non-tender   Extremities: no cyanosis or clubbing   Skin: no xanthelasma, warm.    Heme/lymph/ Immunology No apparent bleeding noted.   Neurologic: Alert and oriented. normal gait, no tremors   Psychiatric: Pleasant, calm, appropriate affect.         Please refer above for cardiac ROS details.       Past History   Past Medical History:   Past Medical History:   Diagnosis Date     Asthma      Chronic obstructive pulmonary disease (H) 9/29/2019     Diabetes mellitus (H)     insulin pump     Diabetic neuropathy (H)      GERD (gastroesophageal reflux disease)      Hyperlipidemia      Hypertension      Morbid obesity (H)      Sleep apnea     uses CPAP        Past Surgical History:   Past Surgical History:   Procedure Laterality Date     COLONOSCOPY N/A 9/20/2017    Procedure: COLONOSCOPY;  Surgeon: Michael Fagan MD;  Location: [facility];  Service:      CV CORONARY ANGIOGRAM N/A 2/19/2020    Procedure: Coronary Angiogram;  Surgeon: Daniel Hayden MD;  Location: [facility];  Service: Cardiology     CV LEFT HEART CATHETERIZATION WITH LEFT VENTRICULOGRAM N/A 2/19/2020    Procedure: Left Heart Catheterization with Left Ventriculogram;  Surgeon: Daniel Hayden MD;  Location: [facility];  Service: Cardiology     GALLBLADDER SURGERY          Family History:   Family History   Problem Relation Age of Onset     Leukemia Mother      Heart Failure Mother      Leukemia Father      Colon Cancer Sister         Social History:   Social History     Socioeconomic History     Marital status:      Spouse name: Not on file     Number of children: Not on file     Years of education: Not on file     Highest education level: Not on file   Occupational History     Not on file   Tobacco Use     Smoking status:  Former Smoker     Smokeless tobacco: Never Used     Tobacco comment: quit 40 years ago   Substance and Sexual Activity     Alcohol use: Yes     Comment: Alcoholic Drinks/day: one beer every two weeks      Drug use: No     Sexual activity: Not on file   Other Topics Concern     Not on file   Social History Narrative     Not on file     Social Determinants of Health     Financial Resource Strain:      Difficulty of Paying Living Expenses:    Food Insecurity:      Worried About Running Out of Food in the Last Year:      Ran Out of Food in the Last Year:    Transportation Needs:      Lack of Transportation (Medical):      Lack of Transportation (Non-Medical):    Physical Activity:      Days of Exercise per Week:      Minutes of Exercise per Session:    Stress:      Feeling of Stress :    Social Connections:      Frequency of Communication with Friends and Family:      Frequency of Social Gatherings with Friends and Family:      Attends [employer] Services:      Active Member of Clubs or Organizations:      Attends Club or Organization Meetings:      Marital Status:    Intimate Partner Violence:      Fear of Current or Ex-Partner:      Emotionally Abused:      Physically Abused:      Sexually Abused:             Lab Results    Chemistry/lipid CBC Cardiac Enzymes/BNP/TSH/INR   Lab Results   Component Value Date    CHOL 123 02/19/2021    HDL 33 (L) 02/19/2021    TRIG 334 (H) 02/19/2021    BUN 24 (H) 02/19/2021     02/19/2021    CO2 23 02/19/2021    Lab Results   Component Value Date    WBC 10.7 03/03/2020    HGB 13.0 (L) 03/03/2020    HCT 41.6 03/03/2020    MCV 95 03/03/2020     03/03/2020    Lab Results   Component Value Date    INR 1.42 (H) 02/27/2020

## 2021-10-26 NOTE — PATIENT INSTRUCTIONS
It was a pleasure to meet with you today.      Below is a summary of your visit.   1. Continue your medications without changes.  2. Work on diabetes control and getting regular exercise. The more of this you are able to accomplish the better your heart disease will be controlled and the better you feel.  3. Follow up with me in about a year or sooner if needed.    Please do not hesitate to call the [facility] clinic with any questions or concerns at [phone]. You can also reach my nurse, Jennifer, during normal business hours at [phone].    Sincerely,

## 2021-11-09 ENCOUNTER — TELEPHONE (OUTPATIENT)
Dept: ENDOCRINOLOGY | Facility: CLINIC | Age: 71
End: 2021-11-09
Payer: COMMERCIAL

## 2021-11-09 DIAGNOSIS — Z79.4 TYPE 2 DIABETES MELLITUS WITH OTHER CIRCULATORY COMPLICATION, WITH LONG-TERM CURRENT USE OF INSULIN (H): ICD-10-CM

## 2021-11-09 DIAGNOSIS — E11.59 TYPE 2 DIABETES MELLITUS WITH OTHER CIRCULATORY COMPLICATION, WITH LONG-TERM CURRENT USE OF INSULIN (H): ICD-10-CM

## 2021-11-09 NOTE — TELEPHONE ENCOUNTER
PRIOR AUTHORIZATION DENIED    Medication: insulin aspart (NOVOLOG VIAL) 100 UNITS/ML vial - DENIED    Denial Date: 11/09/2021     Denial Rational:      Appeal Information:

## 2021-12-08 ENCOUNTER — OFFICE VISIT (OUTPATIENT)
Dept: PODIATRY | Facility: CLINIC | Age: 71
End: 2021-12-08
Payer: COMMERCIAL

## 2021-12-08 VITALS — HEIGHT: 70 IN | HEART RATE: 81 BPM | OXYGEN SATURATION: 97 % | BODY MASS INDEX: 45.1 KG/M2 | WEIGHT: 315 LBS

## 2021-12-08 DIAGNOSIS — L60.0 INGROWN TOENAIL: Primary | ICD-10-CM

## 2021-12-08 PROCEDURE — 99202 OFFICE O/P NEW SF 15 MIN: CPT | Mod: 25 | Performed by: PODIATRIST

## 2021-12-08 PROCEDURE — 11750 EXCISION NAIL&NAIL MATRIX: CPT | Mod: TA | Performed by: PODIATRIST

## 2021-12-08 RX ORDER — LIDOCAINE HYDROCHLORIDE 20 MG/ML
5 INJECTION, SOLUTION INFILTRATION; PERINEURAL ONCE
Status: COMPLETED | OUTPATIENT
Start: 2021-12-08 | End: 2021-12-08

## 2021-12-08 RX ADMIN — LIDOCAINE HYDROCHLORIDE 5 ML: 20 INJECTION, SOLUTION INFILTRATION; PERINEURAL at 10:06

## 2021-12-08 RX ADMIN — LIDOCAINE HYDROCHLORIDE 5 ML: 20 INJECTION, SOLUTION INFILTRATION; PERINEURAL at 10:04

## 2021-12-08 ASSESSMENT — MIFFLIN-ST. JEOR: SCORE: 2467.7

## 2021-12-08 ASSESSMENT — PAIN SCALES - GENERAL: PAINLEVEL: MILD PAIN (2)

## 2021-12-08 NOTE — PROGRESS NOTES
FOOT AND ANKLE SURGERY/PODIATRY CONSULT NOTE         ASSESSMENT:   Ingrown toenail both great toes      TREATMENT:  Performed partial nail excision and matrixectomy of the medial and lateral borders of both great toenails today under local anesthesia of 2% lidocaine plain via the phenol and alcohol technique.  The patient tolerated the procedures and anesthesia well and was discharged in good condition.  He was given both written and verbal postoperative instructions.  The patient is to return to the clinic as needed.        HPI: Walt Lambert presented to the clinic today complaining of severe pain involving ingrown toenails of both great toe.  The patient indicated that he has had this problem for several years.  They have gotten progressively worse.  The pain is more severe.  The nails are aggravated with weightbearing and ambulation.  He has had some redness and swelling surrounding the toenails but particularly the left great toenail.  He denies any trauma to his feet.  He has not had any previous treatment.  The patient has insulin-dependent diabetes mellitus.  He has no numbness or tingling involving his feet.    Past Medical History:   Diagnosis Date     Asthma      Chronic obstructive pulmonary disease (H) 9/29/2019     Diabetes mellitus (H)     insulin pump     Diabetic neuropathy (H)      GERD (gastroesophageal reflux disease)      Hyperlipidemia      Hypertension      Morbid obesity (H)      Sleep apnea     uses CPAP       Social History     Socioeconomic History     Marital status:      Spouse name: Not on file     Number of children: Not on file     Years of education: Not on file     Highest education level: Not on file   Occupational History     Not on file   Tobacco Use     Smoking status: Former Smoker     Smokeless tobacco: Never Used     Tobacco comment: quit 40 years ago   Substance and Sexual Activity     Alcohol use: Yes     Comment: Alcoholic Drinks/day: one beer every two weeks       Drug use: No     Sexual activity: Not on file   Other Topics Concern     Not on file   Social History Narrative     Not on file     Social Determinants of Health     Financial Resource Strain: Not on file   Food Insecurity: Not on file   Transportation Needs: Not on file   Physical Activity: Not on file   Stress: Not on file   Social Connections: Not on file   Intimate Partner Violence: Not on file   Housing Stability: Not on file        No Known Allergies       Current Outpatient Medications:      acetaminophen (TYLENOL) 325 MG tablet, [ACETAMINOPHEN (TYLENOL) 325 MG TABLET] Take 2 tablets (650 mg total) by mouth every 4 (four) hours as needed., Disp: , Rfl: 0     ADVAIR DISKUS 250-50 mcg/dose DISKUS, [ADVAIR DISKUS 250-50 MCG/DOSE DISKUS] Inhale 1 puff 2 (two) times a day., Disp: , Rfl:      amLODIPine (NORVASC) 10 MG tablet, [AMLODIPINE (NORVASC) 10 MG TABLET] 1 tab(s), Disp: , Rfl:      aspirin 81 MG EC tablet, [ASPIRIN 81 MG EC TABLET] Take 81 mg by mouth daily., Disp: , Rfl:      atorvastatin (LIPITOR) 80 MG tablet, Take 1 tablet (80 mg) by mouth daily, Disp: 90 tablet, Rfl: 3     Continuous Blood Gluc Sensor (FREESTYLE ANA M 14 DAY SENSOR) [facility] [place], USE 1 EACH AS DIRECTED EVERY 14 (FOURTEEN) DAYS., Disp: 2 each, Rfl: 1     Digital Therapy (HELLO HEART BLOOD PRESSURE) KIT, , Disp: , Rfl:      dulaglutide 3 mg/0.5 mL PnIj, [DULAGLUTIDE 3 MG/0.5 ML PNIJ] Inject 3 mg under the skin once a week., Disp: 6 mL, Rfl: 1     furosemide (LASIX) 40 MG tablet, [FUROSEMIDE (LASIX) 40 MG TABLET] Take 1 tablet by mouth 2 (two) times a day. , Disp: , Rfl:      lisinopril (ZESTRIL) 20 MG tablet, Take 1 tablet (20 mg) by mouth 2 times daily, Disp: 180 tablet, Rfl: 3     metoprolol tartrate (LOPRESSOR) 100 MG tablet, Take 1 tablet (100 mg) by mouth 2 times daily, Disp: 180 tablet, Rfl: 3     multivitamin (ONE A DAY) per tablet, [MULTIVITAMIN (ONE A DAY) PER TABLET] Take 1 tablet by mouth daily., Disp: , Rfl:      nitroglycerin  (NITROSTAT) 0.4 MG SL tablet, [NITROGLYCERIN (NITROSTAT) 0.4 MG SL TABLET] Place 1 tablet (0.4 mg total) under the tongue every 5 (five) minutes as needed for chest pain (chest discomfort). Take 1 tablet sublingual for chest symptoms and allow to dissolve under tongue without swallowing.  If symptoms do not resolve in 5 minutes, repeat dose and contact EMS by calling 911.  Continue to repeat dose sublingual every 5 minutes for total 4 doses., Disp: 1 Bottle, Rfl: 0     NOVOLOG VIAL 100 UNIT/ML soln, INJECT 375 UNITS OF INSULIN DAILY VIA THE PUMP, Disp: 340 mL, Rfl: 1     omeprazole (PRILOSEC) 20 MG capsule, [OMEPRAZOLE (PRILOSEC) 20 MG CAPSULE] Take 20 mg by mouth daily., Disp: , Rfl:      simvastatin (ZOCOR) 20 MG tablet, Take 20 mg by mouth, Disp: , Rfl:      triamcinolone (KENALOG) 0.025 % cream, [TRIAMCINOLONE (KENALOG) 0.025 % CREAM] Apply 1 application topically 3 (three) times a day as needed. , Disp: , Rfl:      budesonide-formoterol (SYMBICORT) 160-4.5 MCG/ACT Inhaler, Inhale 2 puffs into the lungs daily as needed  (Patient not taking: Reported on 12/8/2021), Disp: , Rfl:      COMBIVENT RESPIMAT  mcg/actuation Mist inhaler, [COMBIVENT RESPIMAT  MCG/ACTUATION MIST INHALER] INHALE 1 PUFF BY MOUTH 4 TIMES A DAY AS NEEDED (Patient not taking: Reported on 10/26/2021), Disp: , Rfl: 3     Continuous Blood Gluc  (FREESTYLE ANA M READER) OLIVIA, 1 each every 14 days, Disp: 6 each, Rfl: 1     Dulaglutide 3 MG/0.5ML SOPN, Inject 3 mg Subcutaneous, Disp: , Rfl:      fluticasone propion-salmeteroL (ADVAIR DISKUS) 250-50 mcg/dose DISKUS, [FLUTICASONE PROPION-SALMETEROL (ADVAIR DISKUS) 250-50 MCG/DOSE DISKUS] 1 INH (Patient not taking: Reported on 12/8/2021), Disp: , Rfl:      INSULIN PUMP CARTRIDGE SUBQ, [INSULIN PUMP CARTRIDGE SUBQ] Inject under the skin., Disp: , Rfl:      insulin pump cartridge, [INSULIN PUMP CARTRIDGE] Inject under the skin continuous. Insulin pump discharge instructions from  "03/03/20.  This is intended as additional information to the patient's PTA insulin pump order.  Continue with insulin pump at present settings., Disp: 1 each, Rfl: 0     Family History   Problem Relation Age of Onset     Leukemia Mother      Heart Failure Mother      Leukemia Father      Colon Cancer Sister         Social History     Socioeconomic History     Marital status:      Spouse name: Not on file     Number of children: Not on file     Years of education: Not on file     Highest education level: Not on file   Occupational History     Not on file   Tobacco Use     Smoking status: Former Smoker     Smokeless tobacco: Never Used     Tobacco comment: quit 40 years ago   Substance and Sexual Activity     Alcohol use: Yes     Comment: Alcoholic Drinks/day: one beer every two weeks      Drug use: No     Sexual activity: Not on file   Other Topics Concern     Not on file   Social History Narrative     Not on file     Social Determinants of Health     Financial Resource Strain: Not on file   Food Insecurity: Not on file   Transportation Needs: Not on file   Physical Activity: Not on file   Stress: Not on file   Social Connections: Not on file   Intimate Partner Violence: Not on file   Housing Stability: Not on file        Review of Systems - Patient denies fever, chills, rash, wound, stiffness, limping, numbness, weakness, heart burn, blood in stool, chest pain with activity, calf pain when walking, shortness of breath with activity, chronic cough, easy bleeding/bruising, swelling of ankles, excessive thirst, fatigue, depression, anxiety.  Patient admits to painful ingrown toenails both great toes.      OBJECTIVE:  Appearance: alert, well appearing, and in no distress.    Pulse 81   Ht 1.778 m (5' 10\")   Wt (!) 169.6 kg (374 lb)   SpO2 97%   BMI 53.66 kg/m       Body mass index is 53.66 kg/m .     General appearance: Patient is alert and fully cooperative with history & exam.  No sign of distress is noted " during the visit.  Psychiatric: Affect is pleasant & appropriate.  Patient appears motivated to improve health.  Respiratory: Breathing is regular & unlabored while sitting.  HEENT: Hearing is intact to spoken word.  Speech is clear.  No gross evidence of visual impairment that would impact ambulation.    Vascular: Dorsalis pedis and posterior tibial pulses are palpable. There is no pedal hair growth bilaterally.  CFT < 3 sec from anterior tibial surface to distal digits bilaterally. There is no appreciable edema noted.  Dermatologic: The medial and lateral borders of both great toenails are severely incurvated.  There is no edema, erythema noted.  Turgor and texture are within normal limits. No coloration or temperature changes. No primary or secondary lesions noted.  Neurologic: All epicritic and proprioceptive sensations are grossly intact bilaterally.  Musculoskeletal: All active and passive ankle, subtalar, midtarsal, and 1st MPJ range of motion are grossly intact without pain or crepitus, with the exception of none. Manual muscle strength is within normal limits bilaterally. All dorsiflexors, plantarflexors, invertors, evertors are intact bilaterally. Tenderness present to the medial and lateral margins of both great toenails on palpation.  No tenderness to both great toes with range of motion. Calf is soft/non-tender without warmth/induration    Imaging:       No images are attached to the encounter or orders placed in the encounter.     No results found.   No results found.       Jorge Negron; JOEL  [facility]/Podiatry

## 2021-12-08 NOTE — LETTER
12/8/2021         RE: Walt Lambert  [address] [place] [id number]        Dear Colleague,    Thank you for referring your patient, Walt Lambert, to the [facility]. Please see a copy of my visit note below.    FOOT AND ANKLE SURGERY/PODIATRY CONSULT NOTE         ASSESSMENT:   Ingrown toenail both great toes      TREATMENT:  Performed partial nail excision and matrixectomy of the medial and lateral borders of both great toenails today under local anesthesia of 2% lidocaine plain via the phenol and alcohol technique.  The patient tolerated the procedures and anesthesia well and was discharged in good condition.  He was given both written and verbal postoperative instructions.  The patient is to return to the clinic as needed.        HPI: Walt Lambert presented to the clinic today complaining of severe pain involving ingrown toenails of both great toe.  The patient indicated that he has had this problem for several years.  They have gotten progressively worse.  The pain is more severe.  The nails are aggravated with weightbearing and ambulation.  He has had some redness and swelling surrounding the toenails but particularly the left great toenail.  He denies any trauma to his feet.  He has not had any previous treatment.  The patient has insulin-dependent diabetes mellitus.  He has no numbness or tingling involving his feet.    Past Medical History:   Diagnosis Date     Asthma      Chronic obstructive pulmonary disease (H) 9/29/2019     Diabetes mellitus (H)     insulin pump     Diabetic neuropathy (H)      GERD (gastroesophageal reflux disease)      Hyperlipidemia      Hypertension      Morbid obesity (H)      Sleep apnea     uses CPAP       Social History     Socioeconomic History     Marital status:      Spouse name: Not on file     Number of children: Not on file     Years of education: Not on file     Highest education level: Not on file    Occupational History     Not on file   Tobacco Use     Smoking status: Former Smoker     Smokeless tobacco: Never Used     Tobacco comment: quit 40 years ago   Substance and Sexual Activity     Alcohol use: Yes     Comment: Alcoholic Drinks/day: one beer every two weeks      Drug use: No     Sexual activity: Not on file   Other Topics Concern     Not on file   Social History Narrative     Not on file     Social Determinants of Health     Financial Resource Strain: Not on file   Food Insecurity: Not on file   Transportation Needs: Not on file   Physical Activity: Not on file   Stress: Not on file   Social Connections: Not on file   Intimate Partner Violence: Not on file   Housing Stability: Not on file        No Known Allergies       Current Outpatient Medications:      acetaminophen (TYLENOL) 325 MG tablet, [ACETAMINOPHEN (TYLENOL) 325 MG TABLET] Take 2 tablets (650 mg total) by mouth every 4 (four) hours as needed., Disp: , Rfl: 0     ADVAIR DISKUS 250-50 mcg/dose DISKUS, [ADVAIR DISKUS 250-50 MCG/DOSE DISKUS] Inhale 1 puff 2 (two) times a day., Disp: , Rfl:      amLODIPine (NORVASC) 10 MG tablet, [AMLODIPINE (NORVASC) 10 MG TABLET] 1 tab(s), Disp: , Rfl:      aspirin 81 MG EC tablet, [ASPIRIN 81 MG EC TABLET] Take 81 mg by mouth daily., Disp: , Rfl:      atorvastatin (LIPITOR) 80 MG tablet, Take 1 tablet (80 mg) by mouth daily, Disp: 90 tablet, Rfl: 3     Continuous Blood Gluc Sensor (FREESTYLE ANA M 14 DAY SENSOR) [facility] [place], USE 1 EACH AS DIRECTED EVERY 14 (FOURTEEN) DAYS., Disp: 2 each, Rfl: 1     Digital [facility] KIT, , Disp: , Rfl:      dulaglutide 3 mg/0.5 mL PnIj, [DULAGLUTIDE 3 MG/0.5 ML PNIJ] Inject 3 mg under the skin once a week., Disp: 6 mL, Rfl: 1     furosemide (LASIX) 40 MG tablet, [FUROSEMIDE (LASIX) 40 MG TABLET] Take 1 tablet by mouth 2 (two) times a day. , Disp: , Rfl:      lisinopril (ZESTRIL) 20 MG tablet, Take 1 tablet (20 mg) by mouth 2 times daily, Disp: 180 tablet,  Rfl: 3     metoprolol tartrate (LOPRESSOR) 100 MG tablet, Take 1 tablet (100 mg) by mouth 2 times daily, Disp: 180 tablet, Rfl: 3     multivitamin (ONE A DAY) per tablet, [MULTIVITAMIN (ONE A DAY) PER TABLET] Take 1 tablet by mouth daily., Disp: , Rfl:      nitroglycerin (NITROSTAT) 0.4 MG SL tablet, [NITROGLYCERIN (NITROSTAT) 0.4 MG SL TABLET] Place 1 tablet (0.4 mg total) under the tongue every 5 (five) minutes as needed for chest pain (chest discomfort). Take 1 tablet sublingual for chest symptoms and allow to dissolve under tongue without swallowing.  If symptoms do not resolve in 5 minutes, repeat dose and contact EMS by calling 911.  Continue to repeat dose sublingual every 5 minutes for total 4 doses., Disp: 1 Bottle, Rfl: 0     NOVOLOG VIAL 100 UNIT/ML soln, INJECT 375 UNITS OF INSULIN DAILY VIA THE PUMP, Disp: 340 mL, Rfl: 1     omeprazole (PRILOSEC) 20 MG capsule, [OMEPRAZOLE (PRILOSEC) 20 MG CAPSULE] Take 20 mg by mouth daily., Disp: , Rfl:      simvastatin (ZOCOR) 20 MG tablet, Take 20 mg by mouth, Disp: , Rfl:      triamcinolone (KENALOG) 0.025 % cream, [TRIAMCINOLONE (KENALOG) 0.025 % CREAM] Apply 1 application topically 3 (three) times a day as needed. , Disp: , Rfl:      budesonide-formoterol (SYMBICORT) 160-4.5 MCG/ACT Inhaler, Inhale 2 puffs into the lungs daily as needed  (Patient not taking: Reported on 12/8/2021), Disp: , Rfl:      COMBIVENT RESPIMAT  mcg/actuation Mist inhaler, [COMBIVENT RESPIMAT  MCG/ACTUATION MIST INHALER] INHALE 1 PUFF BY MOUTH 4 TIMES A DAY AS NEEDED (Patient not taking: Reported on 10/26/2021), Disp: , Rfl: 3     Continuous Blood Gluc  (FREESTYLE ANA M READER) OLIVIA, 1 each every 14 days, Disp: 6 each, Rfl: 1     Dulaglutide 3 MG/0.5ML SOPN, Inject 3 mg Subcutaneous, Disp: , Rfl:      fluticasone propion-salmeteroL (ADVAIR DISKUS) 250-50 mcg/dose DISKUS, [FLUTICASONE PROPION-SALMETEROL (ADVAIR DISKUS) 250-50 MCG/DOSE DISKUS] 1 INH (Patient not taking:  Reported on 12/8/2021), Disp: , Rfl:      INSULIN PUMP CARTRIDGE SUBQ, [INSULIN PUMP CARTRIDGE SUBQ] Inject under the skin., Disp: , Rfl:      insulin pump cartridge, [INSULIN PUMP CARTRIDGE] Inject under the skin continuous. Insulin pump discharge instructions from 03/03/20.  This is intended as additional information to the patient's PTA insulin pump order.  Continue with insulin pump at present settings., Disp: 1 each, Rfl: 0     Family History   Problem Relation Age of Onset     Leukemia Mother      Heart Failure Mother      Leukemia Father      Colon Cancer Sister         Social History     Socioeconomic History     Marital status:      Spouse name: Not on file     Number of children: Not on file     Years of education: Not on file     Highest education level: Not on file   Occupational History     Not on file   Tobacco Use     Smoking status: Former Smoker     Smokeless tobacco: Never Used     Tobacco comment: quit 40 years ago   Substance and Sexual Activity     Alcohol use: Yes     Comment: Alcoholic Drinks/day: one beer every two weeks      Drug use: No     Sexual activity: Not on file   Other Topics Concern     Not on file   Social History Narrative     Not on file     Social Determinants of Health     Financial Resource Strain: Not on file   Food Insecurity: Not on file   Transportation Needs: Not on file   Physical Activity: Not on file   Stress: Not on file   Social Connections: Not on file   Intimate Partner Violence: Not on file   Housing Stability: Not on file        Review of Systems - Patient denies fever, chills, rash, wound, stiffness, limping, numbness, weakness, heart burn, blood in stool, chest pain with activity, calf pain when walking, shortness of breath with activity, chronic cough, easy bleeding/bruising, swelling of ankles, excessive thirst, fatigue, depression, anxiety.  Patient admits to painful ingrown toenails both great toes.      OBJECTIVE:  Appearance: alert, well  "appearing, and in no distress.    Pulse 81   Ht 1.778 m (5' 10\")   Wt (!) 169.6 kg (374 lb)   SpO2 97%   BMI 53.66 kg/m       Body mass index is 53.66 kg/m .     General appearance: Patient is alert and fully cooperative with history & exam.  No sign of distress is noted during the visit.  Psychiatric: Affect is pleasant & appropriate.  Patient appears motivated to improve health.  Respiratory: Breathing is regular & unlabored while sitting.  HEENT: Hearing is intact to spoken word.  Speech is clear.  No gross evidence of visual impairment that would impact ambulation.    Vascular: Dorsalis pedis and posterior tibial pulses are palpable. There is no pedal hair growth bilaterally.  CFT < 3 sec from anterior tibial surface to distal digits bilaterally. There is no appreciable edema noted.  Dermatologic: The medial and lateral borders of both great toenails are severely incurvated.  There is no edema, erythema noted.  Turgor and texture are within normal limits. No coloration or temperature changes. No primary or secondary lesions noted.  Neurologic: All epicritic and proprioceptive sensations are grossly intact bilaterally.  Musculoskeletal: All active and passive ankle, subtalar, midtarsal, and 1st MPJ range of motion are grossly intact without pain or crepitus, with the exception of none. Manual muscle strength is within normal limits bilaterally. All dorsiflexors, plantarflexors, invertors, evertors are intact bilaterally. Tenderness present to the medial and lateral margins of both great toenails on palpation.  No tenderness to both great toes with range of motion. Calf is soft/non-tender without warmth/induration    Imaging:       No images are attached to the encounter or orders placed in the encounter.     No results found.   No results found.       Jorge Avina DPM  [facility]/Podiatry         Again, thank you for allowing me to participate in the care of your patient.  "       Sincerely,        Jorge Negron DPM

## 2021-12-08 NOTE — PATIENT INSTRUCTIONS
POSTOPERATIVE INSTRUCTIONS AFTER CHEMICAL NAIL REMOVAL SURGERY    What to expect after surgery:  Your toe will be numb for around 2-8 hours after your nail procedure due to the shots that were given.  Expect some degree of soreness in your toe later today when the numbness wears off.  Rest, elevation and ice applied at the ankle will help ease the pain. Your bandage was wrapped snug to cut down on bleeding.    This may feel tight when the numbness wears off.  Please remove the bandage tomorrow morning and begin the foot soaks described below.  Warm water will feel good and helps to ease the pain  How to Care for Your Toe After Surgery  One daily foot soak will be necessary to heal properly.  Chemicals were used to kill the root of the nail.  Expect local redness, drainage and tenderness , this will last for 6-8 weeks.  Soaking helps loosen the scab and dried drainage.  Failure to soak leads to a hard scab that blocks drainage.  Back up drainage increases the pain and the scab may need to be removed with another office procedure.  You will heal much quicker and be more comfortable if you are consistent with local wound care and foot soaks.  Soak one time every day for 2 weeks.  Soaks should last 10 minutes.  Soak after taking a shower to get the germs out.  Soak in warm water with hydrogen peroxide 5 parts water to 1 part hydrogen peroxide.  A small amount of bleeding may be noted which is normal.   Clean the surgical site with a Q-tip during each soak.  Dip the Q-tip in the water and gently clean away any crusted drainage.  The area may be tender but you will not harm anything with the Q-tip.  Pat the area dry and allow a few minutes to air dry before applying any bandages.  Flexible fabric style band aids work best.  In general, the area will be wet from drainage.  A small dab of antibiotic ointment is okay but watch out for excessive moisture.  White and wrinkled skin is a sign of too much moisture and that the  skin needs to be dried out. It is ok to allow the toe some air by removing the band aid as needed.  Activity  Feel free to do normal activities as tolerated on the following day.  Wear open toe sandals if regular shoes are not comfortable.  Avoid shoes that are tight on the toe.  Medications   Finish any antibiotics if they have been prescribed.  Tylenol or ibuprofen may be used as needed for pain.  Icing and elevation also help with pain and swelling.  Risks  Watch for signs of infection.  It is normal to see redness, local tenderness, and drainage around the nail area for up to 8 weeks after permanent nail removal surgery.  Call the clinic at [phone] if you see red streaks spreading up the toe, foot, or leg.  Fever and chills are also concerning and you should notify the clinic if you are having these symptoms.  If these symptoms occur when the clinic is closed, please go to urgent care.  How Well Does Permanent Nail Surgery Work?  Permanent nail surgery means that we intend that the nail problem will not come back.  In a small percentage of patients, nail problems return in about 6 months to a year.  We would like to see you back in the office if you are experiencing problems in the future.  Please call [phone] with any additional questions.

## 2021-12-14 ENCOUNTER — TELEPHONE (OUTPATIENT)
Dept: PODIATRY | Facility: CLINIC | Age: 71
End: 2021-12-14
Payer: COMMERCIAL

## 2021-12-14 NOTE — TELEPHONE ENCOUNTER
Patient is calling stating his toe that Dr. Negron saw last week is infected, he will email photos to help assess.

## 2021-12-14 NOTE — TELEPHONE ENCOUNTER
LMTCB to discuss further- any drainage, what color, pain, warmth? Message will be sent to Dr. Negron.

## 2021-12-14 NOTE — TELEPHONE ENCOUNTER
LMTCB to update pt per Dr. Negron-   This is the normal appearance of the toe following this procedure.  It may have disappeared for the next several weeks.  He should not have much discomfort however.  Continue following his postoperative instructions.  There is no sign of any infection.

## 2021-12-15 NOTE — TELEPHONE ENCOUNTER
Spoke with patient, updated him with message below from Dr. Negron. Reinforced that if redness spreads up foot or he develops fever or chills, to seek medical attention.

## 2021-12-15 NOTE — TELEPHONE ENCOUNTER
Here is another picture of my toe, as you will see it is worse.  I have not heard back, what should I do?

## 2021-12-20 ENCOUNTER — TELEPHONE (OUTPATIENT)
Dept: ENDOCRINOLOGY | Facility: CLINIC | Age: 71
End: 2021-12-20
Payer: COMMERCIAL

## 2021-12-20 DIAGNOSIS — E11.59 TYPE 2 DIABETES MELLITUS WITH OTHER CIRCULATORY COMPLICATIONS (H): ICD-10-CM

## 2021-12-20 RX ORDER — DULAGLUTIDE 1.5 MG/.5ML
INJECTION, SOLUTION SUBCUTANEOUS
Qty: 6 ML | Refills: 1 | Status: SHIPPED | OUTPATIENT
Start: 2021-12-20 | End: 2022-01-06

## 2021-12-20 NOTE — PROGRESS NOTES
Alvino is a 71 year old who is being evaluated via a billable video visit.      How would you like to obtain your AVS? MyChart  If the video visit is dropped, the invitation should be resent by: Text to cell phone: [phone]  Will anyone else be joining your video visit? No      Video Start Time: 0930    [facility] ENDOCRINOLOGY    Diabetes Note 1/6/2022    Walt Lambert, 1950, [id number]          Reason for visit      1. Type 2 diabetes mellitus with other circulatory complication, with long-term current use of insulin (H)    2. Chronic kidney disease, stage 1        HPI     Walt Lambert is a very pleasant 71 year old old male who presents for follow up.  SUMMARY:    Alvino is contacted today in f/u for DM 2. His current A1c is 9.2 and his T-Slim pump download certainly reflects poorer control. Over the last two weeks, he had an ave BG of 272. He was within range only 2% of the time and above the rest. He had no hypoglycemia. In discussion regarding his medication regimen, he has been taking 1.5 mg of the Trulicity instead of picking up the 3 mg as discussed at his last appointment. He remains quite sedentary, and more so over the last 2-3 weeks as he has been recovering from bilateral ingrown toenail surgery.     Pt remains in Stage 1 Kidney failure with Creatinine level of 1.31. BUN was normal at 20. He is on an ACE.      Blood glucose data:      Past Medical History     Patient Active Problem List   Diagnosis     Diabetes mellitus (H)     Morbid obesity (H)     Cardiomyopathy (H)     Chronic obstructive pulmonary disease (H)     Hyperlipidemia     Hypertension     Sleep apnea     Insulin pump status     MAHMOOD (dyspnea on exertion)     Abnormal cardiac CT angiography     CAD (coronary artery disease)     S/P CABG (coronary artery bypass graft)     ARF (acute respiratory failure) (H)     Anemia due to blood loss, acute     Arteriosclerosis of coronary artery bypass graft     Body mass index (BMI)  50.0-59.9, adult     Pure hypertriglyceridemia     Peripheral venous insufficiency     Lymphedema     Chronic kidney disease, stage 1        Family History       family history includes Colon Cancer in his sister; Heart Failure in his mother; Leukemia in his father and mother.    Social History      reports that he has quit smoking. He has never used smokeless tobacco. He reports current alcohol use. He reports that he does not use drugs.      Review of Systems     Patient has no polyuria or polydipsia, no chest pain, dyspnea or TIA's, no numbness, tingling or pain in extremities  Remainder negative except as noted in HPI.      Vital Signs     There were no vitals taken for this visit.  Wt Readings from Last 3 Encounters:   12/08/21 (!) 169.6 kg (374 lb)   10/26/21 (!) 166.1 kg (366 lb 3.2 oz)   09/17/20 (!) 156.5 kg (345 lb)       Physical Exam     Constitutional:  Well developed, Well nourished  HENT:  Normocephalic,   Neck: normal in appearance  Eyes:  PERRL, Conjunctiva pink  Respiratory:  No respiratory distress  Skin: No acanthosis nigricans, lipoatrophy or lipodystrophy  Neurologic:  Alert & oriented x 3, nonfocal  Psychiatric:  Affect, Mood, Insight appropriate          Assessment     1. Type 2 diabetes mellitus with other circulatory complication, with long-term current use of insulin (H)    2. Chronic kidney disease, stage 1        Plan     Instructed to take two 1.5 mg Trulicity pens weekly until they are gone, and then start taking the 3 mg. He will continue with his current settings on his pump and work on his diet. F/u with me in 6 months.     Emily Alfred NP  [facility] Endocrinology  1/6/2022  9:35 AM      Lab Results     Microalbumin Urine mg/dL   Date Value Ref Range Status   10/02/2020 17.87 (H) 0.00 - 1.99 mg/dL Final       Cholesterol   Date Value Ref Range Status   02/19/2021 123 <=199 mg/dL Final     Direct Measure HDL   Date Value Ref Range Status   02/19/2021 33 (L) >=40 mg/dL Final      Triglycerides   Date Value Ref Range Status   02/19/2021 334 (H) <=149 mg/dL Final       [unfilled]      Current Medications     Outpatient Medications Prior to Visit   Medication Sig Dispense Refill     acetaminophen (TYLENOL) 325 MG tablet [ACETAMINOPHEN (TYLENOL) 325 MG TABLET] Take 2 tablets (650 mg total) by mouth every 4 (four) hours as needed.  0     ADVAIR DISKUS 250-50 mcg/dose DISKUS [ADVAIR DISKUS 250-50 MCG/DOSE DISKUS] Inhale 1 puff 2 (two) times a day.       amLODIPine (NORVASC) 10 MG tablet [AMLODIPINE (NORVASC) 10 MG TABLET] 1 tab(s)       aspirin 81 MG EC tablet [ASPIRIN 81 MG EC TABLET] Take 81 mg by mouth daily.       atorvastatin (LIPITOR) 80 MG tablet Take 1 tablet (80 mg) by mouth daily 90 tablet 3     Continuous Blood Gluc Sensor ([place].deYLE ANA M 14 DAY SENSOR) [facility] USE 1 EACH AS DIRECTED EVERY 14 (FOURTEEN) DAYS. 2 each 1     Digital Therapy (HELLO HEART BLOOD PRESSURE) KIT        dulaglutide 3 mg/0.5 mL PnIj [DULAGLUTIDE 3 MG/0.5 ML PNIJ] Inject 3 mg under the skin once a week. 6 mL 1     furosemide (LASIX) 40 MG tablet [FUROSEMIDE (LASIX) 40 MG TABLET] Take 1 tablet by mouth 2 (two) times a day.        lisinopril (ZESTRIL) 20 MG tablet Take 1 tablet (20 mg) by mouth 2 times daily 180 tablet 3     metoprolol tartrate (LOPRESSOR) 100 MG tablet Take 1 tablet (100 mg) by mouth 2 times daily 180 tablet 3     multivitamin (ONE A DAY) per tablet [MULTIVITAMIN (ONE A DAY) PER TABLET] Take 1 tablet by mouth daily.       nitroglycerin (NITROSTAT) 0.4 MG SL tablet [NITROGLYCERIN (NITROSTAT) 0.4 MG SL TABLET] Place 1 tablet (0.4 mg total) under the tongue every 5 (five) minutes as needed for chest pain (chest discomfort). Take 1 tablet sublingual for chest symptoms and allow to dissolve under tongue without swallowing.  If symptoms do not resolve in 5 minutes, repeat dose and contact EMS by calling 911.  Continue to repeat dose sublingual every 5 minutes for total 4 doses. 1 Bottle 0     NOVOLOG  VIAL 100 UNIT/ML soln INJECT 375 UNITS OF INSULIN DAILY VIA THE PUMP 340 mL 1     omeprazole (PRILOSEC) 20 MG capsule [OMEPRAZOLE (PRILOSEC) 20 MG CAPSULE] Take 20 mg by mouth daily.       simvastatin (ZOCOR) 20 MG tablet Take 20 mg by mouth       triamcinolone (KENALOG) 0.025 % cream [TRIAMCINOLONE (KENALOG) 0.025 % CREAM] Apply 1 application topically 3 (three) times a day as needed.        TRULICITY 1.5 MG/0.5ML pen INJECT 0.5 ML UNDER THE SKIN ONCE A WEEK. 6 mL 1     No facility-administered medications prior to visit.           Video-Visit Details    Type of service:  Video Visit    Video End Time: 0950    Originating Location (pt. Location): Home    Distant Location (provider location):  [facility]     Platform used for Video Visit: Doximity    Date of last OV: 9/24/21  Reason for Visit: DM    Blood Glucose Log:

## 2021-12-20 NOTE — TELEPHONE ENCOUNTER
The following mychart sent to the patient:    Omar De Oliveira,    Upon review of your chart for your upcoming scheduled virtual visit with Christina Alfred CNP in Endocrinology on 1/06/21, I noted that you are using a t-slim insulin pump.  Please download your pump to the t-connect website anytime the week prior to your appointment, including the night before your appointment.  I will access the report remotely for the scheduled virtual visit with Christina and give her the report to reference and review during your visit.  If you have any problems downloading, please call the number listed on the back of your pump directly for troubleshooting as they are the experts and are a great resource to help iron out any problems with downloading your insulin pump at home.  We look forward to your upcoming virtual visit on 1/06/21.  I typically call up to 60 minutes before the appointment to get you ready for the virtual visit.  Take care and talk with you soon.    Thank you,  Daisy CROWE, Medical Assistant for   Christina Alfred NP  Endocrinology   [phone]

## 2021-12-30 ENCOUNTER — LAB (OUTPATIENT)
Dept: LAB | Facility: CLINIC | Age: 71
End: 2021-12-30
Payer: COMMERCIAL

## 2021-12-30 DIAGNOSIS — Z79.4 TYPE 2 DIABETES MELLITUS WITH OTHER CIRCULATORY COMPLICATION, WITH LONG-TERM CURRENT USE OF INSULIN (H): ICD-10-CM

## 2021-12-30 DIAGNOSIS — E11.59 TYPE 2 DIABETES MELLITUS WITH OTHER CIRCULATORY COMPLICATION, WITH LONG-TERM CURRENT USE OF INSULIN (H): ICD-10-CM

## 2021-12-30 LAB
ANION GAP SERPL CALCULATED.3IONS-SCNC: 11 MMOL/L (ref 5–18)
BUN SERPL-MCNC: 20 MG/DL (ref 8–22)
CALCIUM SERPL-MCNC: 9 MG/DL (ref 8.5–10.5)
CHLORIDE BLD-SCNC: 102 MMOL/L (ref 98–107)
CO2 SERPL-SCNC: 25 MMOL/L (ref 22–31)
CREAT SERPL-MCNC: 1.31 MG/DL (ref 0.7–1.3)
GFR SERPL CREATININE-BSD FRML MDRD: 59 ML/MIN/1.73M2
GLUCOSE BLD-MCNC: 333 MG/DL (ref 70–125)
HBA1C MFR BLD: 9.2 % (ref 0–5.6)
POTASSIUM BLD-SCNC: 4.9 MMOL/L (ref 3.5–5)
SODIUM SERPL-SCNC: 138 MMOL/L (ref 136–145)

## 2021-12-30 PROCEDURE — 83036 HEMOGLOBIN GLYCOSYLATED A1C: CPT

## 2021-12-30 PROCEDURE — 36415 COLL VENOUS BLD VENIPUNCTURE: CPT

## 2021-12-30 PROCEDURE — 80048 BASIC METABOLIC PNL TOTAL CA: CPT

## 2022-01-06 ENCOUNTER — VIRTUAL VISIT (OUTPATIENT)
Dept: ENDOCRINOLOGY | Facility: CLINIC | Age: 72
End: 2022-01-06
Payer: COMMERCIAL

## 2022-01-06 DIAGNOSIS — E11.59 TYPE 2 DIABETES MELLITUS WITH OTHER CIRCULATORY COMPLICATION, WITH LONG-TERM CURRENT USE OF INSULIN (H): Primary | ICD-10-CM

## 2022-01-06 DIAGNOSIS — N18.1 CHRONIC KIDNEY DISEASE, STAGE 1: ICD-10-CM

## 2022-01-06 DIAGNOSIS — Z79.4 TYPE 2 DIABETES MELLITUS WITH OTHER CIRCULATORY COMPLICATION, WITH LONG-TERM CURRENT USE OF INSULIN (H): Primary | ICD-10-CM

## 2022-01-06 PROBLEM — Z72.0 TOBACCO USER: Status: RESOLVED | Noted: 2020-04-06 | Resolved: 2022-01-06

## 2022-01-06 PROBLEM — G89.18 POSTOPERATIVE PAIN: Status: RESOLVED | Noted: 2020-02-26 | Resolved: 2022-01-06

## 2022-01-06 PROCEDURE — 99213 OFFICE O/P EST LOW 20 MIN: CPT | Mod: GT | Performed by: NURSE PRACTITIONER

## 2022-01-06 RX ORDER — DULAGLUTIDE 3 MG/.5ML
3 INJECTION, SOLUTION SUBCUTANEOUS WEEKLY
Qty: 6 ML | Refills: 3 | Status: SHIPPED | OUTPATIENT
Start: 2022-01-06 | End: 2023-02-23

## 2022-02-26 DIAGNOSIS — E11.9 DIABETES MELLITUS (H): ICD-10-CM

## 2022-02-28 RX ORDER — FLASH GLUCOSE SENSOR
KIT MISCELLANEOUS
Qty: 6 EACH | Refills: 1 | Status: SHIPPED | OUTPATIENT
Start: 2022-02-28 | End: 2022-08-29

## 2022-04-01 ENCOUNTER — LAB (OUTPATIENT)
Dept: LAB | Facility: CLINIC | Age: 72
End: 2022-04-01
Payer: COMMERCIAL

## 2022-04-01 DIAGNOSIS — Z79.4 TYPE 2 DIABETES MELLITUS WITH OTHER CIRCULATORY COMPLICATION, WITH LONG-TERM CURRENT USE OF INSULIN (H): ICD-10-CM

## 2022-04-01 DIAGNOSIS — E11.59 TYPE 2 DIABETES MELLITUS WITH OTHER CIRCULATORY COMPLICATION, WITH LONG-TERM CURRENT USE OF INSULIN (H): ICD-10-CM

## 2022-04-01 LAB — HBA1C MFR BLD: 8.6 % (ref 0–5.6)

## 2022-04-01 PROCEDURE — 36415 COLL VENOUS BLD VENIPUNCTURE: CPT

## 2022-04-01 PROCEDURE — 83036 HEMOGLOBIN GLYCOSYLATED A1C: CPT

## 2022-04-01 NOTE — PROGRESS NOTES
"Alvino is a 71 year old who is being evaluated via a billable video visit.      How would you like to obtain your AVS? MyChart  If the video visit is dropped, the invitation should be resent by: Send to e-mail at: [email or web address]  Will anyone else be joining your video visit? No      Video Start Time: 1000    [facility] ENDOCRINOLOGY    Diabetes Note 4/8/2022    Walt Lambert, 1950, [id number]          Reason for visit      1. Type 2 diabetes mellitus with other circulatory complication, with long-term current use of insulin (H)        HPI     Walt Lambert is a very pleasant 71 year old old male who presents for follow up.  SUMMARY:    Alvino is contacted today via Video Visit in f/u for DM 2. His current A1c is 8.6 and down from his last at 9.2.  He reports that he is \"not on the empty plate club anymore\".  He states that his weight is down \"a bit\" as well.  He remains using the T-slim insulin pump and reports that he is on his 5th pump because of problems with the reservoirs. He notes that he is having some significant hip pain, although he has been moving more because it helps the hip. He doesn't walk very far.  Noticeable with his BG is a pretty high reading first thing in the morning. Pt is also taking Trulicity 3 mg weekly. He is on a Statin and an ACE.       Blood glucose data:      Past Medical History     Patient Active Problem List   Diagnosis     Diabetes mellitus (H)     Morbid obesity (H)     Cardiomyopathy (H)     Chronic obstructive pulmonary disease (H)     Hyperlipidemia     Hypertension     Sleep apnea     Insulin pump status     MAHMOOD (dyspnea on exertion)     Abnormal cardiac CT angiography     CAD (coronary artery disease)     S/P CABG (coronary artery bypass graft)     ARF (acute respiratory failure) (H)     Anemia due to blood loss, acute     Arteriosclerosis of coronary artery bypass graft     Body mass index (BMI) 50.0-59.9, adult     Pure hypertriglyceridemia     Peripheral " venous insufficiency     Lymphedema     Chronic kidney disease, stage 1        Family History       family history includes Colon Cancer in his sister; Heart Failure in his mother; Leukemia in his father and mother.    Social History      reports that he has quit smoking. He has never used smokeless tobacco. He reports current alcohol use. He reports that he does not use drugs.      Review of Systems     Patient has no polyuria or polydipsia, no chest pain, dyspnea or TIA's, no numbness, tingling or pain in extremities  Remainder negative except as noted in HPI.      Vital Signs     There were no vitals taken for this visit.  Wt Readings from Last 3 Encounters:   12/08/21 (!) 169.6 kg (374 lb)   10/26/21 (!) 166.1 kg (366 lb 3.2 oz)   09/17/20 (!) 156.5 kg (345 lb)       Physical Exam     Constitutional:  Well developed, Well nourished  HENT:  Normocephalic,   Neck: normal in appearance  Eyes:  PERRL, Conjunctiva pink  Respiratory:  No respiratory distress  Skin: No acanthosis nigricans, lipoatrophy or lipodystrophy  Neurologic:  Alert & oriented x 3, nonfocal  Psychiatric:  Affect, Mood, Insight appropriate          Assessment     1. Type 2 diabetes mellitus with other circulatory complication, with long-term current use of insulin (H)        Plan       Recommended that he increase his overnight Basal rate to help with the elevations in the morning. He is going to work on this. He will remain on his pump and continue with the Trulicity. F/u with me in 3 months.         Emily Alfred NP  [facility]in[facility]  4/8/2022  10:17 AM      Lab Results     Microalbumin Urine mg/dL   Date Value Ref Range Status   10/02/2020 17.87 (H) 0.00 - 1.99 mg/dL Final       Cholesterol   Date Value Ref Range Status   02/19/2021 123 <=199 mg/dL Final     Direct Measure HDL   Date Value Ref Range Status   02/19/2021 33 (L) >=40 mg/dL Final     Triglycerides   Date Value Ref Range Status   02/19/2021 334 (H) <=149 mg/dL Final        [unfilled]      Current Medications     Outpatient Medications Prior to Visit   Medication Sig Dispense Refill     acetaminophen (TYLENOL) 325 MG tablet [ACETAMINOPHEN (TYLENOL) 325 MG TABLET] Take 2 tablets (650 mg total) by mouth every 4 (four) hours as needed.  0     ADVAIR DISKUS 250-50 mcg/dose DISKUS [ADVAIR DISKUS 250-50 MCG/DOSE DISKUS] Inhale 1 puff 2 (two) times a day.       amLODIPine (NORVASC) 10 MG tablet [AMLODIPINE (NORVASC) 10 MG TABLET] 1 tab(s)       aspirin 81 MG EC tablet [ASPIRIN 81 MG EC TABLET] Take 81 mg by mouth daily.       atorvastatin (LIPITOR) 80 MG tablet Take 1 tablet (80 mg) by mouth daily 90 tablet 3     Continuous Blood Gluc Sensor (FREESTYLE ANA M 14 DAY SENSOR) MISC 1 EACH EVERY 14 DAYS 6 each 1     Digital Therapy (HELLO HEART BLOOD PRESSURE) KIT        Dulaglutide (TRULICITY) 3 MG/0.5ML SOPN Inject 3 mg Subcutaneous once a week 6 mL 3     furosemide (LASIX) 40 MG tablet [FUROSEMIDE (LASIX) 40 MG TABLET] Take 1 tablet by mouth 2 (two) times a day.        lisinopril (ZESTRIL) 20 MG tablet Take 1 tablet (20 mg) by mouth 2 times daily 180 tablet 3     metoprolol tartrate (LOPRESSOR) 100 MG tablet Take 1 tablet (100 mg) by mouth 2 times daily 180 tablet 3     multivitamin (ONE A DAY) per tablet [MULTIVITAMIN (ONE A DAY) PER TABLET] Take 1 tablet by mouth daily.       nitroglycerin (NITROSTAT) 0.4 MG SL tablet [NITROGLYCERIN (NITROSTAT) 0.4 MG SL TABLET] Place 1 tablet (0.4 mg total) under the tongue every 5 (five) minutes as needed for chest pain (chest discomfort). Take 1 tablet sublingual for chest symptoms and allow to dissolve under tongue without swallowing.  If symptoms do not resolve in 5 minutes, repeat dose and contact EMS by calling 911.  Continue to repeat dose sublingual every 5 minutes for total 4 doses. 1 Bottle 0     NOVOLOG VIAL 100 UNIT/ML soln INJECT 375 UNITS OF INSULIN DAILY VIA THE PUMP 340 mL 1     omeprazole (PRILOSEC) 20 MG capsule [OMEPRAZOLE (PRILOSEC)  20 MG CAPSULE] Take 20 mg by mouth daily.       simvastatin (ZOCOR) 20 MG tablet Take 20 mg by mouth       triamcinolone (KENALOG) 0.025 % cream [TRIAMCINOLONE (KENALOG) 0.025 % CREAM] Apply 1 application topically 3 (three) times a day as needed.        No facility-administered medications prior to visit.           Video-Visit Details    Type of service:  Video Visit    Video End Time:1020    Originating Location (pt. Location): Home    Distant Location (provider location):  [facility]     Platform used for Video Visit: Panchito    Date of last OV: 1/06/22  Reason for Visit: DM      Blood Glucose Log:  Ivelisse    4/02    245  183    4/03        199    4/04    170    222    4/05  AM     204    4/06    378    234  196    4/07    184    257  227    4/08

## 2022-04-08 ENCOUNTER — VIRTUAL VISIT (OUTPATIENT)
Dept: ENDOCRINOLOGY | Facility: CLINIC | Age: 72
End: 2022-04-08
Payer: COMMERCIAL

## 2022-04-08 DIAGNOSIS — Z79.4 TYPE 2 DIABETES MELLITUS WITH OTHER CIRCULATORY COMPLICATION, WITH LONG-TERM CURRENT USE OF INSULIN (H): Primary | ICD-10-CM

## 2022-04-08 DIAGNOSIS — E11.59 TYPE 2 DIABETES MELLITUS WITH OTHER CIRCULATORY COMPLICATION, WITH LONG-TERM CURRENT USE OF INSULIN (H): Primary | ICD-10-CM

## 2022-04-08 PROCEDURE — 99213 OFFICE O/P EST LOW 20 MIN: CPT | Mod: GT | Performed by: NURSE PRACTITIONER

## 2022-04-30 DIAGNOSIS — E11.59 TYPE 2 DIABETES MELLITUS WITH OTHER CIRCULATORY COMPLICATION, WITH LONG-TERM CURRENT USE OF INSULIN (H): ICD-10-CM

## 2022-04-30 DIAGNOSIS — Z79.4 TYPE 2 DIABETES MELLITUS WITH OTHER CIRCULATORY COMPLICATION, WITH LONG-TERM CURRENT USE OF INSULIN (H): ICD-10-CM

## 2022-05-02 RX ORDER — INSULIN ASPART 100 [IU]/ML
INJECTION, SOLUTION INTRAVENOUS; SUBCUTANEOUS
Qty: 340 ML | Refills: 1 | Status: SHIPPED | OUTPATIENT
Start: 2022-05-02 | End: 2022-09-26

## 2022-05-05 ENCOUNTER — TELEPHONE (OUTPATIENT)
Dept: ENDOCRINOLOGY | Facility: CLINIC | Age: 72
End: 2022-05-05
Payer: COMMERCIAL

## 2022-05-05 NOTE — TELEPHONE ENCOUNTER
Dulaglutide (TRULICITY) 3 MG/0.5ML SOPN 6 mL 3 1/6/2022  --   Sig - Route: Inject 3 mg Subcutaneous once a week - Subcutaneous   Sent to pharmacy as: Trulicity 3 MG/0.5ML Subcutaneous Solution Pen-injector (Dulaglutide)     Please submit a PA

## 2022-05-10 NOTE — TELEPHONE ENCOUNTER
Central Prior Authorization Team   Phone: [phone]    PA Initiation    Medication:   Insurance Company: [employer] - Phone [phone] Fax [phone]  Pharmacy Filling the Rx: [id number] - [place], [place] - [address] AT [address] [facility]. [address]  Filling Pharmacy Phone: [phone]  Filling Pharmacy Fax: [phone]  Start Date: 5/10/2022

## 2022-05-11 NOTE — TELEPHONE ENCOUNTER
Prior Authorization Approval    Authorization Effective Date: 5/10/2022  Authorization Expiration Date: 5/9/2025  Medication: TRULICITY - APPROVED  Approved Dose/Quantity:    Reference #:     Insurance Company: [employer] - [employer] [phone] Fax [phone]  Expected CoPay:       CoPay Card Available:      Foundation Assistance Needed:    Which Pharmacy is filling the prescription (Not needed for infusion/clinic administered): [id number] - [place], [place] - [address]  Pharmacy Notified: Yes  Patient Notified: Yes  **Instructed pharmacy to notify patient when script is ready to /ship.**

## 2022-05-13 ENCOUNTER — LAB REQUISITION (OUTPATIENT)
Dept: LAB | Facility: CLINIC | Age: 72
End: 2022-05-13

## 2022-05-13 DIAGNOSIS — E78.1 PURE HYPERGLYCERIDEMIA: ICD-10-CM

## 2022-05-13 LAB
ALBUMIN SERPL-MCNC: 3.4 G/DL (ref 3.5–5)
ALP SERPL-CCNC: 113 U/L (ref 45–120)
ALT SERPL W P-5'-P-CCNC: 78 U/L (ref 0–45)
ANION GAP SERPL CALCULATED.3IONS-SCNC: 12 MMOL/L (ref 5–18)
AST SERPL W P-5'-P-CCNC: 69 U/L (ref 0–40)
BILIRUB SERPL-MCNC: 1.4 MG/DL (ref 0–1)
BUN SERPL-MCNC: 18 MG/DL (ref 8–22)
CALCIUM SERPL-MCNC: 8.9 MG/DL (ref 8.5–10.5)
CHLORIDE BLD-SCNC: 103 MMOL/L (ref 98–107)
CHOLEST SERPL-MCNC: 132 MG/DL
CO2 SERPL-SCNC: 28 MMOL/L (ref 22–31)
CREAT SERPL-MCNC: 1.1 MG/DL (ref 0.7–1.3)
FASTING STATUS PATIENT QL REPORTED: ABNORMAL
GFR SERPL CREATININE-BSD FRML MDRD: 73 ML/MIN/1.73M2
GLUCOSE BLD-MCNC: 152 MG/DL (ref 70–125)
HDLC SERPL-MCNC: 34 MG/DL
LDLC SERPL CALC-MCNC: 61 MG/DL
LDLC SERPL CALC-MCNC: ABNORMAL MG/DL
POTASSIUM BLD-SCNC: 4.4 MMOL/L (ref 3.5–5)
PROT SERPL-MCNC: 6.4 G/DL (ref 6–8)
SODIUM SERPL-SCNC: 143 MMOL/L (ref 136–145)
TRIGL SERPL-MCNC: 454 MG/DL

## 2022-05-13 PROCEDURE — 83721 ASSAY OF BLOOD LIPOPROTEIN: CPT | Performed by: FAMILY MEDICINE

## 2022-05-13 PROCEDURE — 80061 LIPID PANEL: CPT | Performed by: FAMILY MEDICINE

## 2022-05-13 PROCEDURE — 80053 COMPREHEN METABOLIC PANEL: CPT | Performed by: FAMILY MEDICINE

## 2022-05-31 ENCOUNTER — OFFICE VISIT (OUTPATIENT)
Dept: CARDIOLOGY | Facility: CLINIC | Age: 72
End: 2022-05-31
Payer: COMMERCIAL

## 2022-05-31 VITALS
HEART RATE: 78 BPM | RESPIRATION RATE: 20 BRPM | DIASTOLIC BLOOD PRESSURE: 80 MMHG | BODY MASS INDEX: 54.09 KG/M2 | WEIGHT: 315 LBS | SYSTOLIC BLOOD PRESSURE: 144 MMHG | OXYGEN SATURATION: 95 %

## 2022-05-31 DIAGNOSIS — R06.09 DOE (DYSPNEA ON EXERTION): Primary | ICD-10-CM

## 2022-05-31 DIAGNOSIS — Z95.1 S/P CABG (CORONARY ARTERY BYPASS GRAFT): ICD-10-CM

## 2022-05-31 LAB — BNP SERPL-MCNC: 37 PG/ML (ref 0–65)

## 2022-05-31 PROCEDURE — 83880 ASSAY OF NATRIURETIC PEPTIDE: CPT | Performed by: INTERNAL MEDICINE

## 2022-05-31 PROCEDURE — 36415 COLL VENOUS BLD VENIPUNCTURE: CPT | Performed by: INTERNAL MEDICINE

## 2022-05-31 PROCEDURE — 99214 OFFICE O/P EST MOD 30 MIN: CPT | Performed by: INTERNAL MEDICINE

## 2022-05-31 RX ORDER — BUMETANIDE 2 MG/1
2 TABLET ORAL 2 TIMES DAILY
Qty: 60 TABLET | Refills: 1 | Status: SHIPPED | OUTPATIENT
Start: 2022-05-31 | End: 2022-06-03

## 2022-05-31 NOTE — PROGRESS NOTES
Cardiology Progress Note     Assessment:  Dyspnea, progressive, likely multifactorial, possibly some fluid overload related to heart failure with preserved ejection fraction and/or pericardial disease after open heart surgery  Coronary artery disease status post coronary artery bypass graft in 2020, no angina  Hypertension, resistant, longstanding, suboptimal control  Chronic kidney disease  Morbid obesity  Diabetes mellitus    Plan:  BNP today  Cardiac MR to assess pericardial disease and LV/RV function.  Transthoracic echo may have limited diagnostic accuracy due to patient's  body habitus.    Change furosemide to bumetanide 2 mg twice a day, basic metabolic panel in 3 to 4 days    Should follow-up with Dr. Mitch Duncan his regular cardiologist after cardiac MR    Subjective:   This is 71 year old male who comes in today for cardiology follow-up.  He reports progressive weight gain, leg swelling, shortness of breath.  His symptoms are primarily noticeable with exertion.  He does not have any shortness of breath when he sits down and lay in bed.  He denies chest pains or heart palpitations.    Review of Systems:   Negative other than history of present illness    Objective:   BP (!) 144/80 (BP Location: Left arm, Patient Position: Sitting, Cuff Size: Adult Large)   Pulse 78   Resp 20   Wt (!) 171 kg (377 lb)   SpO2 95%   BMI 54.09 kg/m    Physical Exam:  GENERAL: no distress  NECK: JVD is not visible  LUNGS: Distant breath sounds  CARDIAC: regular rhythm, S1 & S2 normal.  No heaves, thrills, gallops or murmurs.  ABDOMEN: flat, negative hepatosplenomegaly, soft and non-tender.  EXTREMITIES: No evidence of cyanosis, clubbing 2+ edema.    Current Outpatient Medications   Medication Sig Dispense Refill     acetaminophen (TYLENOL) 325 MG tablet [ACETAMINOPHEN (TYLENOL) 325 MG TABLET] Take 2 tablets (650 mg total) by mouth every 4 (four) hours as needed.  0     ADVAIR DISKUS 250-50 mcg/dose DISKUS [ADVAIR DISKUS  250-50 MCG/DOSE DISKUS] Inhale 1 puff 2 (two) times a day.       amLODIPine (NORVASC) 10 MG tablet [AMLODIPINE (NORVASC) 10 MG TABLET] 1 tab(s)       aspirin 81 MG EC tablet [ASPIRIN 81 MG EC TABLET] Take 81 mg by mouth daily.       atorvastatin (LIPITOR) 80 MG tablet Take 1 tablet (80 mg) by mouth daily 90 tablet 3     bumetanide (BUMEX) 2 MG tablet Take 1 tablet (2 mg) by mouth 2 times daily 60 tablet 1     Continuous Blood Gluc Sensor (FREESTYLE ANA M 14 DAY SENSOR) MISC 1 EACH EVERY 14 DAYS 6 each 1     Digital Therapy (HELLO HEART BLOOD PRESSURE) KIT        Dulaglutide (TRULICITY) 3 MG/0.5ML SOPN Inject 3 mg Subcutaneous once a week 6 mL 3     lisinopril (ZESTRIL) 20 MG tablet Take 1 tablet (20 mg) by mouth 2 times daily 180 tablet 3     metoprolol tartrate (LOPRESSOR) 100 MG tablet Take 1 tablet (100 mg) by mouth 2 times daily 180 tablet 3     multivitamin (ONE A DAY) per tablet [MULTIVITAMIN (ONE A DAY) PER TABLET] Take 1 tablet by mouth daily.       nitroglycerin (NITROSTAT) 0.4 MG SL tablet [NITROGLYCERIN (NITROSTAT) 0.4 MG SL TABLET] Place 1 tablet (0.4 mg total) under the tongue every 5 (five) minutes as needed for chest pain (chest discomfort). Take 1 tablet sublingual for chest symptoms and allow to dissolve under tongue without swallowing.  If symptoms do not resolve in 5 minutes, repeat dose and contact EMS by calling 911.  Continue to repeat dose sublingual every 5 minutes for total 4 doses. 1 Bottle 0     NOVOLOG VIAL 100 UNIT/ML soln INJECT 375 UNITS OF INSULIN DAILY VIA THE PUMP 340 mL 1     omeprazole (PRILOSEC) 20 MG capsule [OMEPRAZOLE (PRILOSEC) 20 MG CAPSULE] Take 20 mg by mouth daily.       simvastatin (ZOCOR) 20 MG tablet Take 20 mg by mouth       triamcinolone (KENALOG) 0.025 % cream [TRIAMCINOLONE (KENALOG) 0.025 % CREAM] Apply 1 application topically 3 (three) times a day as needed.          Cardiographics:    TTE 2/6/2020  1. Normal left ventricular size and systolic performance with a  visually estimated ejection fraction of 65%.   2. There is mild to moderate concentric increase in left ventricular wall thickness.   3. No significant valvular heart disease is identified on this study.   4. Normal right ventricular size and systolic performance.      Stress test: dated 12/24/2019 revealed    The nuclear stress test is abnormal.     There is a small area of mild ischemia in the inferior segment(s) of the left ventricle. Cannot exclude artifact.     The left ventricular ejection fraction at stress is 61%.     A prior study was conducted on 3/10/2015.  This study has changes noted when compared with the prior study:  mild inferior wall ischemia is now present     CT coronary angiogram with CAC 2/6/2020  The patient's image quality is poor due to morbid obesity.  The contrast did not fill coronary artery well.     1.  The total Agatston calcium score is 3784. A calcium score in this range places the individual in the 100th percentile when compared to an age and gender matched control group and implies a very high risk of cardiac events in the next ten years.     2.  Mild disease in left main.     3.  Diffuse calcified three coronary arteries.  Due to poor image quality, cannot exclude severe stenosis in mid LAD, proximal LCX and mid RCA.     Recommend invasive coronary angiogram for further cardiac evaluation if clinically indicated.     Cardiac cath: from 2/19/2020 demonstrated     The left ventricular size is normal. The left ventricular systolic function is normal. LV systolic pressure is normal. LV end diastolic pressure is normal. There are no wall motion abnormalities in the left ventricle.    2nd Mrg lesion is 70% stenosed.    Ost LM lesion is 30% stenosed.    Ost LAD to Mid LAD lesion is 70% stenosed.    Mid LAD lesion is 50% stenosed.    Mid RCA lesion is 75% stenosed.    Prox RCA lesion is 90% stenosed.    Mild aortic valve gradient, possible aortic stenosis     Lab Results     Chemistry/lipid CBC Cardiac Enzymes/BNP/TSH/INR   Recent Labs   Lab Test 05/13/22  1427   CHOL 132   HDL 34*   LDL 61   TRIG 454*     Recent Labs   Lab Test 05/13/22  1427 02/19/21  1303 02/19/20  0843   LDL 61 23 91     Recent Labs   Lab Test 05/13/22  1427      POTASSIUM 4.4   CHLORIDE 103   CO2 28   *   BUN 18   CR 1.10   GFRESTIMATED 73   VALERIA 8.9     Recent Labs   Lab Test 05/13/22  1427 12/30/21  0916 02/19/21  1303   CR 1.10 1.31* 1.11     Recent Labs   Lab Test 04/01/22  0758 12/30/21  0917 09/20/21  0853   A1C 8.6* 9.2* 8.8*          Recent Labs   Lab Test 03/03/20  0703   WBC 10.7   HGB 13.0*   HCT 41.6   MCV 95        Recent Labs   Lab Test 03/03/20  0703 03/01/20  0951 02/29/20  0515   HGB 13.0* 13.6* 12.2*    No results for input(s): TROPONINI in the last 59238 hours.  No results for input(s): BNP, NTBNPI, NTBNP in the last 81368 hours.  No results for input(s): TSH in the last 99621 hours.  Recent Labs   Lab Test 02/27/20  0401 02/26/20  1304 02/26/20  1210   INR 1.42* 1.32* 1.44*

## 2022-05-31 NOTE — PATIENT INSTRUCTIONS
Walt Lambert,    It was a pleasure to see you today at the [facility].     My recommendations after this visit include:    Change furosemide to bumex  Blood work on Friday  Cardiac mr    PRABHU. Archie Clark MD, FACC, [facility]

## 2022-05-31 NOTE — LETTER
5/31/2022    Piyush Chung MD  [facility] [address] [place]  [place] [address]    RE: Walt Lambert       Dear Colleague,     I had the pleasure of seeing Walt Lambert in the [facility].      Cardiology Progress Note     Assessment:  Dyspnea, progressive, likely multifactorial, possibly some fluid overload related to heart failure with preserved ejection fraction and/or pericardial disease after open heart surgery  Coronary artery disease status post coronary artery bypass graft in 2020, no angina  Hypertension, resistant, longstanding, suboptimal control  Chronic kidney disease  Morbid obesity  Diabetes mellitus    Plan:  BNP today  Cardiac MR to assess pericardial disease and LV/RV function.  Transthoracic echo may have limited diagnostic accuracy due to patient's  body habitus.    Change furosemide to bumetanide 2 mg twice a day, basic metabolic panel in 3 to 4 days    Should follow-up with Dr. Mitch Duncan his regular cardiologist after cardiac MR    Subjective:   This is 71 year old male who comes in today for cardiology follow-up.  He reports progressive weight gain, leg swelling, shortness of breath.  His symptoms are primarily noticeable with exertion.  He does not have any shortness of breath when he sits down and lay in bed.  He denies chest pains or heart palpitations.    Review of Systems:   Negative other than history of present illness    Objective:   BP (!) 144/80 (BP Location: Left arm, Patient Position: Sitting, Cuff Size: Adult Large)   Pulse 78   Resp 20   Wt (!) 171 kg (377 lb)   SpO2 95%   BMI 54.09 kg/m    Physical Exam:  GENERAL: no distress  NECK: JVD is not visible  LUNGS: Distant breath sounds  CARDIAC: regular rhythm, S1 & S2 normal.  No heaves, thrills, gallops or murmurs.  ABDOMEN: flat, negative hepatosplenomegaly, soft and non-tender.  EXTREMITIES: No evidence of cyanosis, clubbing 2+ edema.    Current Outpatient Medications    Medication Sig Dispense Refill     acetaminophen (TYLENOL) 325 MG tablet [ACETAMINOPHEN (TYLENOL) 325 MG TABLET] Take 2 tablets (650 mg total) by mouth every 4 (four) hours as needed.  0     ADVAIR DISKUS 250-50 mcg/dose DISKUS [ADVAIR DISKUS 250-50 MCG/DOSE DISKUS] Inhale 1 puff 2 (two) times a day.       amLODIPine (NORVASC) 10 MG tablet [AMLODIPINE (NORVASC) 10 MG TABLET] 1 tab(s)       aspirin 81 MG EC tablet [ASPIRIN 81 MG EC TABLET] Take 81 mg by mouth daily.       atorvastatin (LIPITOR) 80 MG tablet Take 1 tablet (80 mg) by mouth daily 90 tablet 3     bumetanide (BUMEX) 2 MG tablet Take 1 tablet (2 mg) by mouth 2 times daily 60 tablet 1     Continuous Blood Gluc Sensor (GlySureSTYLE ANA M 14 DAY SENSOR) MISC 1 EACH EVERY 14 DAYS 6 each 1     Digital Therapy (HELLO HEART BLOOD PRESSURE) KIT        Dulaglutide (TRULICITY) 3 MG/0.5ML SOPN Inject 3 mg Subcutaneous once a week 6 mL 3     lisinopril (ZESTRIL) 20 MG tablet Take 1 tablet (20 mg) by mouth 2 times daily 180 tablet 3     metoprolol tartrate (LOPRESSOR) 100 MG tablet Take 1 tablet (100 mg) by mouth 2 times daily 180 tablet 3     multivitamin (ONE A DAY) per tablet [MULTIVITAMIN (ONE A DAY) PER TABLET] Take 1 tablet by mouth daily.       nitroglycerin (NITROSTAT) 0.4 MG SL tablet [NITROGLYCERIN (NITROSTAT) 0.4 MG SL TABLET] Place 1 tablet (0.4 mg total) under the tongue every 5 (five) minutes as needed for chest pain (chest discomfort). Take 1 tablet sublingual for chest symptoms and allow to dissolve under tongue without swallowing.  If symptoms do not resolve in 5 minutes, repeat dose and contact EMS by calling 911.  Continue to repeat dose sublingual every 5 minutes for total 4 doses. 1 Bottle 0     NOVOLOG VIAL 100 UNIT/ML soln INJECT 375 UNITS OF INSULIN DAILY VIA THE PUMP 340 mL 1     omeprazole (PRILOSEC) 20 MG capsule [OMEPRAZOLE (PRILOSEC) 20 MG CAPSULE] Take 20 mg by mouth daily.       simvastatin (ZOCOR) 20 MG tablet Take 20 mg by mouth        triamcinolone (KENALOG) 0.025 % cream [TRIAMCINOLONE (KENALOG) 0.025 % CREAM] Apply 1 application topically 3 (three) times a day as needed.          Cardiographics:    TTE 2/6/2020  1. Normal left ventricular size and systolic performance with a visually estimated ejection fraction of 65%.   2. There is mild to moderate concentric increase in left ventricular wall thickness.   3. No significant valvular heart disease is identified on this study.   4. Normal right ventricular size and systolic performance.      Stress test: dated 12/24/2019 revealed    The nuclear stress test is abnormal.     There is a small area of mild ischemia in the inferior segment(s) of the left ventricle. Cannot exclude artifact.     The left ventricular ejection fraction at stress is 61%.     A prior study was conducted on 3/10/2015.  This study has changes noted when compared with the prior study:  mild inferior wall ischemia is now present     CT coronary angiogram with CAC 2/6/2020  The patient's image quality is poor due to morbid obesity.  The contrast did not fill coronary artery well.     1.  The total Agatston calcium score is 3784. A calcium score in this range places the individual in the 100th percentile when compared to an age and gender matched control group and implies a very high risk of cardiac events in the next ten years.     2.  Mild disease in left main.     3.  Diffuse calcified three coronary arteries.  Due to poor image quality, cannot exclude severe stenosis in mid LAD, proximal LCX and mid RCA.     Recommend invasive coronary angiogram for further cardiac evaluation if clinically indicated.     Cardiac cath: from 2/19/2020 demonstrated     The left ventricular size is normal. The left ventricular systolic function is normal. LV systolic pressure is normal. LV end diastolic pressure is normal. There are no wall motion abnormalities in the left ventricle.    2nd Mrg lesion is 70% stenosed.    Ost LM lesion is 30%  stenosed.    Ost LAD to Mid LAD lesion is 70% stenosed.    Mid LAD lesion is 50% stenosed.    Mid RCA lesion is 75% stenosed.    Prox RCA lesion is 90% stenosed.    Mild aortic valve gradient, possible aortic stenosis     Lab Results    Chemistry/lipid CBC Cardiac Enzymes/BNP/TSH/INR   Recent Labs   Lab Test 05/13/22  1427   CHOL 132   HDL 34*   LDL 61   TRIG 454*     Recent Labs   Lab Test 05/13/22  1427 02/19/21  1303 02/19/20  0843   LDL 61 23 91     Recent Labs   Lab Test 05/13/22  1427      POTASSIUM 4.4   CHLORIDE 103   CO2 28   *   BUN 18   CR 1.10   GFRESTIMATED 73   VALERIA 8.9     Recent Labs   Lab Test 05/13/22  1427 12/30/21  0916 02/19/21  1303   CR 1.10 1.31* 1.11     Recent Labs   Lab Test 04/01/22  0758 12/30/21  0917 09/20/21  0853   A1C 8.6* 9.2* 8.8*          Recent Labs   Lab Test 03/03/20  0703   WBC 10.7   HGB 13.0*   HCT 41.6   MCV 95        Recent Labs   Lab Test 03/03/20  0703 03/01/20  0951 02/29/20  0515   HGB 13.0* 13.6* 12.2*    No results for input(s): TROPONINI in the last 18143 hours.  No results for input(s): BNP, NTBNPI, NTBNP in the last 00088 hours.  No results for input(s): TSH in the last 68812 hours.  Recent Labs   Lab Test 02/27/20  0401 02/26/20  1304 02/26/20  1210   INR 1.42* 1.32* 1.44*                        Thank you for allowing me to participate in the care of your patient.      Sincerely,     Archie Clark MD     [facility]  cc:   No referring provider defined for this encounter.

## 2022-06-03 ENCOUNTER — TELEPHONE (OUTPATIENT)
Dept: CARDIOLOGY | Facility: CLINIC | Age: 72
End: 2022-06-03

## 2022-06-03 ENCOUNTER — LAB (OUTPATIENT)
Dept: CARDIOLOGY | Facility: CLINIC | Age: 72
End: 2022-06-03
Payer: COMMERCIAL

## 2022-06-03 DIAGNOSIS — Z95.1 S/P CABG (CORONARY ARTERY BYPASS GRAFT): ICD-10-CM

## 2022-06-03 DIAGNOSIS — R06.09 DOE (DYSPNEA ON EXERTION): ICD-10-CM

## 2022-06-03 LAB
ANION GAP SERPL CALCULATED.3IONS-SCNC: 12 MMOL/L (ref 5–18)
BUN SERPL-MCNC: 31 MG/DL (ref 8–22)
CALCIUM SERPL-MCNC: 9.2 MG/DL (ref 8.5–10.5)
CHLORIDE BLD-SCNC: 100 MMOL/L (ref 98–107)
CO2 SERPL-SCNC: 31 MMOL/L (ref 22–31)
CREAT SERPL-MCNC: 1.45 MG/DL (ref 0.7–1.3)
GFR SERPL CREATININE-BSD FRML MDRD: 52 ML/MIN/1.73M2
GLUCOSE BLD-MCNC: 185 MG/DL (ref 70–125)
POTASSIUM BLD-SCNC: 4.4 MMOL/L (ref 3.5–5)
SODIUM SERPL-SCNC: 143 MMOL/L (ref 136–145)

## 2022-06-03 PROCEDURE — 36415 COLL VENOUS BLD VENIPUNCTURE: CPT

## 2022-06-03 PROCEDURE — 80048 BASIC METABOLIC PNL TOTAL CA: CPT

## 2022-06-03 RX ORDER — BUMETANIDE 2 MG/1
2 TABLET ORAL DAILY
Qty: 60 TABLET | Refills: 1
Start: 2022-06-03 | End: 2022-06-23

## 2022-06-03 NOTE — TELEPHONE ENCOUNTER
----- Message from Archie Clark MD sent at 6/3/2022 12:23 PM CDT -----  Should decrease bumetanide to 2 mg once a day and repeat basic metabolic panel in 1 week  She will follow-up with Dr. Duncan after cardiac MR        Called patient and updated on above. He verbalized understanding and will also call to schedule his MRI today. BMP placed. Msg to schedulers and med list updated. -[facility]

## 2022-06-10 ENCOUNTER — LAB (OUTPATIENT)
Dept: CARDIOLOGY | Facility: CLINIC | Age: 72
End: 2022-06-10
Payer: COMMERCIAL

## 2022-06-10 DIAGNOSIS — R06.09 DOE (DYSPNEA ON EXERTION): ICD-10-CM

## 2022-06-10 LAB
ANION GAP SERPL CALCULATED.3IONS-SCNC: 12 MMOL/L (ref 5–18)
BUN SERPL-MCNC: 16 MG/DL (ref 8–22)
CALCIUM SERPL-MCNC: 9.1 MG/DL (ref 8.5–10.5)
CHLORIDE BLD-SCNC: 102 MMOL/L (ref 98–107)
CO2 SERPL-SCNC: 29 MMOL/L (ref 22–31)
CREAT SERPL-MCNC: 1.12 MG/DL (ref 0.7–1.3)
GFR SERPL CREATININE-BSD FRML MDRD: 71 ML/MIN/1.73M2
GLUCOSE BLD-MCNC: 101 MG/DL (ref 70–125)
POTASSIUM BLD-SCNC: 4.2 MMOL/L (ref 3.5–5)
SODIUM SERPL-SCNC: 143 MMOL/L (ref 136–145)

## 2022-06-10 PROCEDURE — 36415 COLL VENOUS BLD VENIPUNCTURE: CPT

## 2022-06-10 PROCEDURE — 80048 BASIC METABOLIC PNL TOTAL CA: CPT

## 2022-06-27 ENCOUNTER — HOSPITAL ENCOUNTER (OUTPATIENT)
Dept: MRI IMAGING | Facility: CLINIC | Age: 72
Discharge: HOME | End: 2022-06-27
Attending: INTERNAL MEDICINE | Admitting: INTERNAL MEDICINE
Payer: COMMERCIAL

## 2022-06-27 DIAGNOSIS — Z95.1 S/P CABG (CORONARY ARTERY BYPASS GRAFT): ICD-10-CM

## 2022-06-27 DIAGNOSIS — R06.09 DOE (DYSPNEA ON EXERTION): ICD-10-CM

## 2022-06-27 PROCEDURE — 255N000002 HC RX 255 OP 636: Performed by: INTERNAL MEDICINE

## 2022-06-27 PROCEDURE — 75561 CARDIAC MRI FOR MORPH W/DYE: CPT

## 2022-06-27 PROCEDURE — A9585 GADOBUTROL INJECTION: HCPCS | Performed by: INTERNAL MEDICINE

## 2022-06-27 PROCEDURE — 75561 CARDIAC MRI FOR MORPH W/DYE: CPT | Mod: 26 | Performed by: RADIOLOGY

## 2022-06-27 RX ORDER — GADOBUTROL 604.72 MG/ML
15 INJECTION INTRAVENOUS ONCE
Status: COMPLETED | OUTPATIENT
Start: 2022-06-27 | End: 2022-06-27

## 2022-06-27 RX ADMIN — GADOBUTROL 15 ML: 604.72 INJECTION INTRAVENOUS at 15:06

## 2022-06-28 DIAGNOSIS — Z95.1 S/P CABG (CORONARY ARTERY BYPASS GRAFT): Primary | ICD-10-CM

## 2022-06-28 NOTE — PROGRESS NOTES
Archie Clark MD   6/27/2022  8:27 PM CDT Back to Top        Normal, please send to dr soto               Follow up [facility] order placed- see ov note from wtbarry. -c

## 2022-07-06 ENCOUNTER — OFFICE VISIT (OUTPATIENT)
Dept: CARDIOLOGY | Facility: CLINIC | Age: 72
End: 2022-07-06
Attending: INTERNAL MEDICINE
Payer: COMMERCIAL

## 2022-07-06 VITALS
SYSTOLIC BLOOD PRESSURE: 132 MMHG | DIASTOLIC BLOOD PRESSURE: 64 MMHG | HEART RATE: 85 BPM | WEIGHT: 315 LBS | BODY MASS INDEX: 52.95 KG/M2 | RESPIRATION RATE: 16 BRPM

## 2022-07-06 DIAGNOSIS — Z95.1 S/P CABG (CORONARY ARTERY BYPASS GRAFT): ICD-10-CM

## 2022-07-06 DIAGNOSIS — E66.813 CLASS 3 SEVERE OBESITY DUE TO EXCESS CALORIES WITH SERIOUS COMORBIDITY AND BODY MASS INDEX (BMI) OF 50.0 TO 59.9 IN ADULT (H): ICD-10-CM

## 2022-07-06 DIAGNOSIS — E66.01 CLASS 3 SEVERE OBESITY DUE TO EXCESS CALORIES WITH SERIOUS COMORBIDITY AND BODY MASS INDEX (BMI) OF 50.0 TO 59.9 IN ADULT (H): ICD-10-CM

## 2022-07-06 DIAGNOSIS — E11.59 TYPE 2 DIABETES MELLITUS WITH OTHER CIRCULATORY COMPLICATION, WITH LONG-TERM CURRENT USE OF INSULIN (H): ICD-10-CM

## 2022-07-06 DIAGNOSIS — E78.2 MIXED HYPERLIPIDEMIA: ICD-10-CM

## 2022-07-06 DIAGNOSIS — I10 ESSENTIAL HYPERTENSION: ICD-10-CM

## 2022-07-06 DIAGNOSIS — Z79.4 TYPE 2 DIABETES MELLITUS WITH OTHER CIRCULATORY COMPLICATION, WITH LONG-TERM CURRENT USE OF INSULIN (H): ICD-10-CM

## 2022-07-06 DIAGNOSIS — R06.09 DOE (DYSPNEA ON EXERTION): ICD-10-CM

## 2022-07-06 DIAGNOSIS — I25.10 CORONARY ARTERY DISEASE INVOLVING NATIVE CORONARY ARTERY OF NATIVE HEART WITHOUT ANGINA PECTORIS: Primary | ICD-10-CM

## 2022-07-06 PROCEDURE — 99214 OFFICE O/P EST MOD 30 MIN: CPT | Performed by: INTERNAL MEDICINE

## 2022-07-06 NOTE — PROGRESS NOTES
Thank you, Piyush Reeves, for asking the [facility] team to see Mr. Walt Lambert to evaluate Follow Up        Assessment/Recommendations   Assessment:    1. Coronary artery disease s/p CABG (LIMA to LAD, L radial artery to PDA, SVG to OM and SVG to Diagonal branch) by Dr. Russell on 2/26/2020 - stable without angina.  2. Hypertension - controlled.  3. Hyperlipidemia - on appropriate statin  4. Morbid obesity due to excess calories - uncontrolled  5. Insulin dependent DMII - not optimally controlled with A1C of 8.6 in April  6. Chronic dyspnea on exertion - most likely due to deconditioning and his weight. Some degree of HFpEF is contributing as well, but this seems to be reasonably controlled with bumetanide. He likely has restrictive lung disease or obesity hypoventilation as well. He is requesting a referral to pulmonology for lung evaluation.    Plan:  1. Refer to pulmonary as per patient request.  2. Continue medications without changes  3. Discussed lifestyle changes for weight loss and increasing mobility/activity. Offered referral to a weight loss clinic but he declines.  4. Follow up with me in about a year or sooner if needed.         History of Present Illness   Mr. Walt Lambert is a 70 year old male who presents for follow-up of his coronary artery disease.     Mr. Lambert had a coronary artery bypass graft surgery in early February, 2020. This was performed after a stress test ordered for dyspnea on exertion was suggestive of ischemia vs artifact and a CTA coronary angiogram revealed severely elevated CAC and multivessel CAD.     was recently evaluated for dyspnea on exertion, likely related to HFpEF. His furosemide was changed to bumetanide. A cardiac MRI was performed that revealed normal biventricular function, no valve disease and a small focal area of LGE not in an ischemic pattern. In the interim he has lost 10 lbs of water weight. His breathing is  unchanged. He has very limited mobility due to joint pain and shortness of breath. He denies anginal chest pain.    Other than noted above, Mr. Lambert denies any chest pain/pressure/tightness, shortness of breath at rest or with exertion, light headedness/dizziness, pre-syncope, syncope, lower extremity swelling, palpitations, paroxysmal nocturnal dyspnea (PND), or orthopnea.     Cardiac Problems and Cardiac Diagnostics     Most Recent Cardiac testing:    Cardiac MRI 6/27/22  CONCLUSIONS:  Normal biventricular function with LVEF and RVEF of 71%. Focal mesocardial LGE in basal  anterior wall in a nonischemic pattern. Otherwise, no MI, fibrosis or infiltrative disease. No evidence of  pericarditis. No definite evidence of edema or acute inflammation on T2 weighted images.    TTE 2/6/2020  1. Normal left ventricular size and systolic performance with a visually estimated ejection fraction of 65%.   2. There is mild to moderate concentric increase in left ventricular wall thickness.   3. No significant valvular heart disease is identified on this study.   4. Normal right ventricular size and systolic performance.     Stress test: dated 12/24/2019 revealed    The nuclear stress test is abnormal.     There is a small area of mild ischemia in the inferior segment(s) of the left ventricle. Cannot exclude artifact.     The left ventricular ejection fraction at stress is 61%.     A prior study was conducted on 3/10/2015.  This study has changes noted when compared with the prior study:  mild inferior wall ischemia is now present     CT coronary angiogram with CAC 2/6/2020  The patient's image quality is poor due to morbid obesity.  The contrast did not fill coronary artery well.     1.  The total Agatston calcium score is 3784. A calcium score in this range places the individual in the 100th percentile when compared to an age and gender matched control group and implies a very high risk of cardiac events in the next ten  years.     2.  Mild disease in left main.     3.  Diffuse calcified three coronary arteries.  Due to poor image quality, cannot exclude severe stenosis in mid LAD, proximal LCX and mid RCA.     Recommend invasive coronary angiogram for further cardiac evaluation if clinically indicated.     Cardiac cath: from 2/19/2020 demonstrated     The left ventricular size is normal. The left ventricular systolic function is normal. LV systolic pressure is normal. LV end diastolic pressure is normal. There are no wall motion abnormalities in the left ventricle.    2nd Mrg lesion is 70% stenosed.    Ost LM lesion is 30% stenosed.    Ost LAD to Mid LAD lesion is 70% stenosed.    Mid LAD lesion is 50% stenosed.    Mid RCA lesion is 75% stenosed.    Prox RCA lesion is 90% stenosed.    Mild aortic valve gradient, possible aortic stenosis         Medications  Allergies   Current Outpatient Medications   Medication Sig Dispense Refill     acetaminophen (TYLENOL) 325 MG tablet [ACETAMINOPHEN (TYLENOL) 325 MG TABLET] Take 2 tablets (650 mg total) by mouth every 4 (four) hours as needed.  0     ADVAIR DISKUS 250-50 mcg/dose DISKUS [ADVAIR DISKUS 250-50 MCG/DOSE DISKUS] Inhale 1 puff 2 (two) times a day.       amLODIPine (NORVASC) 10 MG tablet [AMLODIPINE (NORVASC) 10 MG TABLET] 1 tab(s)       aspirin 81 MG EC tablet [ASPIRIN 81 MG EC TABLET] Take 81 mg by mouth daily.       atorvastatin (LIPITOR) 80 MG tablet Take 1 tablet (80 mg) by mouth daily 90 tablet 3     bumetanide (BUMEX) 2 MG tablet Take 1 tablet (2 mg) by mouth daily 90 tablet 1     Continuous Blood Gluc Sensor (FREESTYLE ANA M 14 DAY SENSOR) MISC 1 EACH EVERY 14 DAYS 6 each 1     Digital Therapy (HELLO HEART BLOOD PRESSURE) KIT        Dulaglutide (TRULICITY) 3 MG/0.5ML SOPN Inject 3 mg Subcutaneous once a week 6 mL 3     lisinopril (ZESTRIL) 20 MG tablet Take 1 tablet (20 mg) by mouth 2 times daily 180 tablet 3     metoprolol tartrate (LOPRESSOR) 100 MG tablet Take 1 tablet  (100 mg) by mouth 2 times daily 180 tablet 3     multivitamin (ONE A DAY) per tablet [MULTIVITAMIN (ONE A DAY) PER TABLET] Take 1 tablet by mouth daily.       nitroglycerin (NITROSTAT) 0.4 MG SL tablet [NITROGLYCERIN (NITROSTAT) 0.4 MG SL TABLET] Place 1 tablet (0.4 mg total) under the tongue every 5 (five) minutes as needed for chest pain (chest discomfort). Take 1 tablet sublingual for chest symptoms and allow to dissolve under tongue without swallowing.  If symptoms do not resolve in 5 minutes, repeat dose and contact EMS by calling 911.  Continue to repeat dose sublingual every 5 minutes for total 4 doses. 1 Bottle 0     NOVOLOG VIAL 100 UNIT/ML soln INJECT 375 UNITS OF INSULIN DAILY VIA THE PUMP 340 mL 1     omeprazole (PRILOSEC) 20 MG capsule [OMEPRAZOLE (PRILOSEC) 20 MG CAPSULE] Take 20 mg by mouth daily.       simvastatin (ZOCOR) 20 MG tablet Take 20 mg by mouth       triamcinolone (KENALOG) 0.025 % cream [TRIAMCINOLONE (KENALOG) 0.025 % CREAM] Apply 1 application topically 3 (three) times a day as needed.         No Known Allergies     Physical Examination Review of Systems   Vitals: /64 (BP Location: Left arm, Patient Position: Sitting, Cuff Size: Adult Large)   Pulse 85   Resp 16   Wt (!) 167.4 kg (369 lb)   BMI 52.95 kg/m    BMI= Body mass index is 52.95 kg/m .  Wt Readings from Last 3 Encounters:   07/06/22 (!) 167.4 kg (369 lb)   05/31/22 (!) 171 kg (377 lb)   12/08/21 (!) 169.6 kg (374 lb)       General Appearance:   Pleasant male, appears stated age. no acute distress, morbidly obese body habitus   ENT/Mouth: membranes moist, no apparent gingival bleeding.      EYES:  no scleral icterus, normal conjunctivae   Neck: no carotid bruits. supple   Respiratory:   lungs are clear to auscultation, no rales or wheezing, equal chest wall expansion    Cardiovascular:   Regular rhythm, normal rate. Normal first and second heart sounds with no murmurs, rubs, or gallops; no edema bilaterally     Abdomen/GI:  Soft, non-tender   Extremities: no cyanosis or clubbing   Skin: no xanthelasma, warm.    Heme/lymph/ Immunology No apparent bleeding noted.   Neurologic: Alert and oriented. no tremors   Psychiatric: Pleasant, calm, appropriate affect.         Please refer above for cardiac ROS details.       Past History   Past Medical History:   Past Medical History:   Diagnosis Date     Asthma      Chronic obstructive pulmonary disease (H) 9/29/2019     Diabetes mellitus (H)     insulin pump     Diabetic neuropathy (H)      GERD (gastroesophageal reflux disease)      Hyperlipidemia      Hypertension      Morbid obesity (H)      Sleep apnea     uses CPAP        Past Surgical History:   Past Surgical History:   Procedure Laterality Date     COLONOSCOPY N/A 9/20/2017    Procedure: COLONOSCOPY;  Surgeon: Michael Fagan MD;  Location: [facility];  Service:      CV CORONARY ANGIOGRAM N/A 2/19/2020    Procedure: Coronary Angiogram;  Surgeon: Daniel Hayden MD;  Location: [facility] Lab;  Service: Cardiology     CV LEFT HEART CATHETERIZATION WITH LEFT VENTRICULOGRAM N/A 2/19/2020    Procedure: Left Heart Catheterization with Left Ventriculogram;  Surgeon: Daniel Hayden MD;  Location: [facility];  Service: Cardiology     GALLBLADDER SURGERY          Family History:   Family History   Problem Relation Age of Onset     Leukemia Mother      Heart Failure Mother      Leukemia Father      Colon Cancer Sister         Social History:   Social History     Socioeconomic History     Marital status:      Spouse name: Not on file     Number of children: Not on file     Years of education: Not on file     Highest education level: Not on file   Occupational History     Not on file   Tobacco Use     Smoking status: Former Smoker     Smokeless tobacco: Never Used     Tobacco comment: quit 40 years ago   Substance and Sexual Activity     Alcohol use: Yes     Comment: Alcoholic Drinks/day: one beer every  two weeks      Drug use: No     Sexual activity: Not on file   Other Topics Concern     Not on file   Social History Narrative     Not on file     Social Determinants of Health     Financial Resource Strain: Not on file   Food Insecurity: Not on file   Transportation Needs: Not on file   Physical Activity: Not on file   Stress: Not on file   Social Connections: Not on file   Intimate Partner Violence: Not on file   Housing Stability: Not on file            Lab Results    Chemistry/lipid CBC Cardiac Enzymes/BNP/TSH/INR   Lab Results   Component Value Date    CHOL 132 05/13/2022    HDL 34 (L) 05/13/2022    TRIG 454 (H) 05/13/2022    BUN 16 06/10/2022     06/10/2022    CO2 29 06/10/2022    Lab Results   Component Value Date    WBC 10.7 03/03/2020    HGB 13.0 (L) 03/03/2020    HCT 41.6 03/03/2020    MCV 95 03/03/2020     03/03/2020    Lab Results   Component Value Date    BNP 37 05/31/2022    INR 1.42 (H) 02/27/2020

## 2022-07-06 NOTE — PATIENT INSTRUCTIONS
It was a pleasure to meet with you today.      Below is a summary of your visit.   Continue your medications without changes.  Work on finding ways to be more active. Exercising in a pool or on a machine that takes the weight off of your joints will allow you to do more.  Follow up with me in a year or sooner if needed     Please do not hesitate to call the [facility] clinic with any questions or concerns at [phone].     Sincerely,

## 2022-07-06 NOTE — LETTER
7/6/2022    Piyush Chung MD  [facility] [address]  [place] [address]    RE: Walt Lambert       Dear Colleague,     I had the pleasure of seeing Walt Lambert in the [facility].      Thank you, Piyush Reeves, for asking the [facility] team to see Mr. Walt Lambert to evaluate Follow Up        Assessment/Recommendations   Assessment:    1. Coronary artery disease s/p CABG (LIMA to LAD, L radial artery to PDA, SVG to OM and SVG to Diagonal branch) by Dr. Russell on 2/26/2020 - stable without angina.  2. Hypertension - controlled.  3. Hyperlipidemia - on appropriate statin  4. Morbid obesity due to excess calories - uncontrolled  5. Insulin dependent DMII - not optimally controlled with A1C of 8.6 in April  6. Chronic dyspnea on exertion - most likely due to deconditioning and his weight. Some degree of HFpEF is contributing as well, but this seems to be reasonably controlled with bumetanide. He likely has restrictive lung disease or obesity hypoventilation as well. He is requesting a referral to pulmonology for lung evaluation.    Plan:  1. Refer to pulmonary as per patient request.  2. Continue medications without changes  3. Discussed lifestyle changes for weight loss and increasing mobility/activity. Offered referral to a weight loss clinic but he declines.  4. Follow up with me in about a year or sooner if needed.         History of Present Illness   Mr. Walt Lambert is a 70 year old male who presents for follow-up of his coronary artery disease.     Mr. Lambert had a coronary artery bypass graft surgery in early February, 2020. This was performed after a stress test ordered for dyspnea on exertion was suggestive of ischemia vs artifact and a CTA coronary angiogram revealed severely elevated CAC and multivessel CAD.     was recently evaluated for dyspnea on exertion, likely related to HFpEF. His furosemide was  changed to bumetanide. A cardiac MRI was performed that revealed normal biventricular function, no valve disease and a small focal area of LGE not in an ischemic pattern. In the interim he has lost 10 lbs of water weight. His breathing is unchanged. He has very limited mobility due to joint pain and shortness of breath. He denies anginal chest pain.    Other than noted above, Mr. Lambert denies any chest pain/pressure/tightness, shortness of breath at rest or with exertion, light headedness/dizziness, pre-syncope, syncope, lower extremity swelling, palpitations, paroxysmal nocturnal dyspnea (PND), or orthopnea.     Cardiac Problems and Cardiac Diagnostics     Most Recent Cardiac testing:    Cardiac MRI 6/27/22  CONCLUSIONS:  Normal biventricular function with LVEF and RVEF of 71%. Focal mesocardial LGE in basal  anterior wall in a nonischemic pattern. Otherwise, no MI, fibrosis or infiltrative disease. No evidence of  pericarditis. No definite evidence of edema or acute inflammation on T2 weighted images.    TTE 2/6/2020  1. Normal left ventricular size and systolic performance with a visually estimated ejection fraction of 65%.   2. There is mild to moderate concentric increase in left ventricular wall thickness.   3. No significant valvular heart disease is identified on this study.   4. Normal right ventricular size and systolic performance.     Stress test: dated 12/24/2019 revealed    The nuclear stress test is abnormal.     There is a small area of mild ischemia in the inferior segment(s) of the left ventricle. Cannot exclude artifact.     The left ventricular ejection fraction at stress is 61%.     A prior study was conducted on 3/10/2015.  This study has changes noted when compared with the prior study:  mild inferior wall ischemia is now present     CT coronary angiogram with CAC 2/6/2020  The patient's image quality is poor due to morbid obesity.  The contrast did not fill coronary artery well.     1.  The  total Agatston calcium score is 3784. A calcium score in this range places the individual in the 100th percentile when compared to an age and gender matched control group and implies a very high risk of cardiac events in the next ten years.     2.  Mild disease in left main.     3.  Diffuse calcified three coronary arteries.  Due to poor image quality, cannot exclude severe stenosis in mid LAD, proximal LCX and mid RCA.     Recommend invasive coronary angiogram for further cardiac evaluation if clinically indicated.     Cardiac cath: from 2/19/2020 demonstrated     The left ventricular size is normal. The left ventricular systolic function is normal. LV systolic pressure is normal. LV end diastolic pressure is normal. There are no wall motion abnormalities in the left ventricle.    2nd Mrg lesion is 70% stenosed.    Ost LM lesion is 30% stenosed.    Ost LAD to Mid LAD lesion is 70% stenosed.    Mid LAD lesion is 50% stenosed.    Mid RCA lesion is 75% stenosed.    Prox RCA lesion is 90% stenosed.    Mild aortic valve gradient, possible aortic stenosis         Medications  Allergies   Current Outpatient Medications   Medication Sig Dispense Refill     acetaminophen (TYLENOL) 325 MG tablet [ACETAMINOPHEN (TYLENOL) 325 MG TABLET] Take 2 tablets (650 mg total) by mouth every 4 (four) hours as needed.  0     ADVAIR DISKUS 250-50 mcg/dose DISKUS [ADVAIR DISKUS 250-50 MCG/DOSE DISKUS] Inhale 1 puff 2 (two) times a day.       amLODIPine (NORVASC) 10 MG tablet [AMLODIPINE (NORVASC) 10 MG TABLET] 1 tab(s)       aspirin 81 MG EC tablet [ASPIRIN 81 MG EC TABLET] Take 81 mg by mouth daily.       atorvastatin (LIPITOR) 80 MG tablet Take 1 tablet (80 mg) by mouth daily 90 tablet 3     bumetanide (BUMEX) 2 MG tablet Take 1 tablet (2 mg) by mouth daily 90 tablet 1     Continuous Blood Gluc Sensor (FREESTYLE ANA M 14 DAY SENSOR) MISC 1 EACH EVERY 14 DAYS 6 each 1     Digital Therapy (HELLO HEART BLOOD PRESSURE) KIT        Dulaglutide  (TRULICITY) 3 MG/0.5ML SOPN Inject 3 mg Subcutaneous once a week 6 mL 3     lisinopril (ZESTRIL) 20 MG tablet Take 1 tablet (20 mg) by mouth 2 times daily 180 tablet 3     metoprolol tartrate (LOPRESSOR) 100 MG tablet Take 1 tablet (100 mg) by mouth 2 times daily 180 tablet 3     multivitamin (ONE A DAY) per tablet [MULTIVITAMIN (ONE A DAY) PER TABLET] Take 1 tablet by mouth daily.       nitroglycerin (NITROSTAT) 0.4 MG SL tablet [NITROGLYCERIN (NITROSTAT) 0.4 MG SL TABLET] Place 1 tablet (0.4 mg total) under the tongue every 5 (five) minutes as needed for chest pain (chest discomfort). Take 1 tablet sublingual for chest symptoms and allow to dissolve under tongue without swallowing.  If symptoms do not resolve in 5 minutes, repeat dose and contact EMS by calling 911.  Continue to repeat dose sublingual every 5 minutes for total 4 doses. 1 Bottle 0     NOVOLOG VIAL 100 UNIT/ML soln INJECT 375 UNITS OF INSULIN DAILY VIA THE PUMP 340 mL 1     omeprazole (PRILOSEC) 20 MG capsule [OMEPRAZOLE (PRILOSEC) 20 MG CAPSULE] Take 20 mg by mouth daily.       simvastatin (ZOCOR) 20 MG tablet Take 20 mg by mouth       triamcinolone (KENALOG) 0.025 % cream [TRIAMCINOLONE (KENALOG) 0.025 % CREAM] Apply 1 application topically 3 (three) times a day as needed.         No Known Allergies     Physical Examination Review of Systems   Vitals: /64 (BP Location: Left arm, Patient Position: Sitting, Cuff Size: Adult Large)   Pulse 85   Resp 16   Wt (!) 167.4 kg (369 lb)   BMI 52.95 kg/m    BMI= Body mass index is 52.95 kg/m .  Wt Readings from Last 3 Encounters:   07/06/22 (!) 167.4 kg (369 lb)   05/31/22 (!) 171 kg (377 lb)   12/08/21 (!) 169.6 kg (374 lb)       General Appearance:   Pleasant male, appears stated age. no acute distress, morbidly obese body habitus   ENT/Mouth: membranes moist, no apparent gingival bleeding.      EYES:  no scleral icterus, normal conjunctivae   Neck: no carotid bruits. supple   Respiratory:   lungs  are clear to auscultation, no rales or wheezing, equal chest wall expansion    Cardiovascular:   Regular rhythm, normal rate. Normal first and second heart sounds with no murmurs, rubs, or gallops; no edema bilaterally    Abdomen/GI:  Soft, non-tender   Extremities: no cyanosis or clubbing   Skin: no xanthelasma, warm.    Heme/lymph/ Immunology No apparent bleeding noted.   Neurologic: Alert and oriented. no tremors   Psychiatric: Pleasant, calm, appropriate affect.         Please refer above for cardiac ROS details.       Past History   Past Medical History:   Past Medical History:   Diagnosis Date     Asthma      Chronic obstructive pulmonary disease (H) 9/29/2019     Diabetes mellitus (H)     insulin pump     Diabetic neuropathy (H)      GERD (gastroesophageal reflux disease)      Hyperlipidemia      Hypertension      Morbid obesity (H)      Sleep apnea     uses CPAP        Past Surgical History:   Past Surgical History:   Procedure Laterality Date     COLONOSCOPY N/A 9/20/2017    Procedure: COLONOSCOPY;  Surgeon: Michael Fagan MD;  Location: [facility];  Service:      CV CORONARY ANGIOGRAM N/A 2/19/2020    Procedure: Coronary Angiogram;  Surgeon: Daniel Hayden MD;  Location: [facility];  Service: Cardiology     CV LEFT HEART CATHETERIZATION WITH LEFT VENTRICULOGRAM N/A 2/19/2020    Procedure: Left Heart Catheterization with Left Ventriculogram;  Surgeon: Daniel Hayden MD;  Location: [facility];  Service: Cardiology     GALLBLADDER SURGERY          Family History:   Family History   Problem Relation Age of Onset     Leukemia Mother      Heart Failure Mother      Leukemia Father      Colon Cancer Sister         Social History:   Social History     Socioeconomic History     Marital status:      Spouse name: Not on file     Number of children: Not on file     Years of education: Not on file     Highest education level: Not on file   Occupational History     Not on file    Tobacco Use     Smoking status: Former Smoker     Smokeless tobacco: Never Used     Tobacco comment: quit 40 years ago   Substance and Sexual Activity     Alcohol use: Yes     Comment: Alcoholic Drinks/day: one beer every two weeks      Drug use: No     Sexual activity: Not on file   Other Topics Concern     Not on file   Social History Narrative     Not on file     Social Determinants of Health     Financial Resource Strain: Not on file   Food Insecurity: Not on file   Transportation Needs: Not on file   Physical Activity: Not on file   Stress: Not on file   Social Connections: Not on file   Intimate Partner Violence: Not on file   Housing Stability: Not on file            Lab Results    Chemistry/lipid CBC Cardiac Enzymes/BNP/TSH/INR   Lab Results   Component Value Date    CHOL 132 05/13/2022    HDL 34 (L) 05/13/2022    TRIG 454 (H) 05/13/2022    BUN 16 06/10/2022     06/10/2022    CO2 29 06/10/2022    Lab Results   Component Value Date    WBC 10.7 03/03/2020    HGB 13.0 (L) 03/03/2020    HCT 41.6 03/03/2020    MCV 95 03/03/2020     03/03/2020    Lab Results   Component Value Date    BNP 37 05/31/2022    INR 1.42 (H) 02/27/2020                Thank you for allowing me to participate in the care of your patient.      Sincerely,     Mitch Duncan MD     [facility]  cc:   Archie Clark MD  [address]  [address]  [place] [address]

## 2022-07-08 DIAGNOSIS — R06.09 DOE (DYSPNEA ON EXERTION): Primary | ICD-10-CM

## 2022-07-23 ENCOUNTER — HEALTH MAINTENANCE LETTER (OUTPATIENT)
Age: 72
End: 2022-07-23

## 2022-08-08 ENCOUNTER — LAB (OUTPATIENT)
Dept: LAB | Facility: CLINIC | Age: 72
End: 2022-08-08
Payer: COMMERCIAL

## 2022-08-08 ENCOUNTER — HOSPITAL ENCOUNTER (OUTPATIENT)
Dept: RESPIRATORY THERAPY | Facility: CLINIC | Age: 72
Discharge: HOME | End: 2022-08-08
Payer: COMMERCIAL

## 2022-08-08 DIAGNOSIS — R06.09 DOE (DYSPNEA ON EXERTION): ICD-10-CM

## 2022-08-08 DIAGNOSIS — E11.59 TYPE 2 DIABETES MELLITUS WITH OTHER CIRCULATORY COMPLICATION, WITH LONG-TERM CURRENT USE OF INSULIN (H): ICD-10-CM

## 2022-08-08 DIAGNOSIS — Z79.4 TYPE 2 DIABETES MELLITUS WITH OTHER CIRCULATORY COMPLICATION, WITH LONG-TERM CURRENT USE OF INSULIN (H): ICD-10-CM

## 2022-08-08 LAB
ANION GAP SERPL CALCULATED.3IONS-SCNC: 14 MMOL/L (ref 5–18)
BUN SERPL-MCNC: 18 MG/DL (ref 8–22)
CALCIUM SERPL-MCNC: 9.4 MG/DL (ref 8.5–10.5)
CHLORIDE BLD-SCNC: 101 MMOL/L (ref 98–107)
CO2 SERPL-SCNC: 25 MMOL/L (ref 22–31)
CREAT SERPL-MCNC: 1.22 MG/DL (ref 0.7–1.3)
CREAT UR-MCNC: 28.1 MG/DL
DLCOCOR-%PRED-PRE: 88 %
DLCOCOR-PRE: 22.36 ML/MIN/MMHG
DLCOUNC-%PRED-PRE: 88 %
DLCOUNC-PRE: 22.42 ML/MIN/MMHG
DLCOUNC-PRED: 25.3 ML/MIN/MMHG
ERV-%PRED-PRE: -110 %
ERV-PRE: 0.1 L
ERV-PRED: -0.09 L
EXPTIME-PRE: 7.59 SEC
FEF2575-%PRED-POST: 77 %
FEF2575-%PRED-PRE: 56 %
FEF2575-POST: 1.9 L/SEC
FEF2575-PRE: 1.39 L/SEC
FEF2575-PRED: 2.44 L/SEC
FEFMAX-%PRED-PRE: 76 %
FEFMAX-PRE: 6.28 L/SEC
FEFMAX-PRED: 8.23 L/SEC
FEV1-%PRED-PRE: 69 %
FEV1-PRE: 2.2 L
FEV1FEV6-PRE: 68 %
FEV1FEV6-PRED: 78 %
FEV1FVC-PRE: 68 %
FEV1FVC-PRED: 76 %
FEV1SVC-PRE: 64 %
FEV1SVC-PRED: 68 %
FIFMAX-PRE: 6.94 L/SEC
FRCPLETH-%PRED-PRE: 112 %
FRCPLETH-PRE: 4.07 L
FRCPLETH-PRED: 3.63 L
FVC-%PRED-PRE: 78 %
FVC-PRE: 3.24 L
FVC-PRED: 4.14 L
GFR SERPL CREATININE-BSD FRML MDRD: 64 ML/MIN/1.73M2
GLUCOSE BLD-MCNC: 149 MG/DL (ref 70–125)
HBA1C MFR BLD: 8.4 %
HGB BLD-MCNC: 14.7 G/DL (ref 13.3–17.7)
IC-%PRED-PRE: 70 %
IC-PRE: 3.31 L
IC-PRED: 4.7 L
LDLC SERPL DIRECT ASSAY-MCNC: 58 MG/DL
MICROALBUMIN UR-MCNC: 74.4 MG/L
MICROALBUMIN/CREAT UR: 264.77 MG/G CR (ref 0–17)
POTASSIUM BLD-SCNC: 4.4 MMOL/L (ref 3.5–5)
RVPLETH-%PRED-PRE: 153 %
RVPLETH-PRE: 3.97 L
RVPLETH-PRED: 2.58 L
SODIUM SERPL-SCNC: 140 MMOL/L (ref 136–145)
TLCPLETH-%PRED-PRE: 106 %
TLCPLETH-PRE: 7.38 L
TLCPLETH-PRED: 6.92 L
VA-%PRED-PRE: 110 %
VA-PRE: 6.83 L
VC-%PRED-PRE: 73 %
VC-PRE: 3.41 L
VC-PRED: 4.61 L

## 2022-08-08 PROCEDURE — 94060 EVALUATION OF WHEEZING: CPT | Mod: 26 | Performed by: INTERNAL MEDICINE

## 2022-08-08 PROCEDURE — 82043 UR ALBUMIN QUANTITATIVE: CPT

## 2022-08-08 PROCEDURE — 94726 PLETHYSMOGRAPHY LUNG VOLUMES: CPT

## 2022-08-08 PROCEDURE — 94726 PLETHYSMOGRAPHY LUNG VOLUMES: CPT | Mod: 26 | Performed by: INTERNAL MEDICINE

## 2022-08-08 PROCEDURE — 36415 COLL VENOUS BLD VENIPUNCTURE: CPT | Performed by: INTERNAL MEDICINE

## 2022-08-08 PROCEDURE — 94729 DIFFUSING CAPACITY: CPT | Mod: 26 | Performed by: INTERNAL MEDICINE

## 2022-08-08 PROCEDURE — 83036 HEMOGLOBIN GLYCOSYLATED A1C: CPT

## 2022-08-08 PROCEDURE — 83721 ASSAY OF BLOOD LIPOPROTEIN: CPT

## 2022-08-08 PROCEDURE — 94729 DIFFUSING CAPACITY: CPT

## 2022-08-08 PROCEDURE — 82310 ASSAY OF CALCIUM: CPT

## 2022-08-08 PROCEDURE — 999N000157 HC STATISTIC RCP TIME EA 10 MIN

## 2022-08-08 PROCEDURE — 85018 HEMOGLOBIN: CPT | Performed by: INTERNAL MEDICINE

## 2022-08-08 PROCEDURE — 250N000009 HC RX 250: Performed by: INTERNAL MEDICINE

## 2022-08-08 PROCEDURE — 94060 EVALUATION OF WHEEZING: CPT

## 2022-08-08 RX ORDER — ALBUTEROL SULFATE 0.83 MG/ML
2.5 SOLUTION RESPIRATORY (INHALATION) ONCE
Status: COMPLETED | OUTPATIENT
Start: 2022-08-08 | End: 2022-08-08

## 2022-08-08 RX ADMIN — ALBUTEROL SULFATE 2.5 MG: 2.5 SOLUTION RESPIRATORY (INHALATION) at 09:18

## 2022-08-08 NOTE — PROGRESS NOTES
RESPIRATORY CARE NOTE    Complete Pulmonary Function Test completed by patient.  Good patient effort and cooperation. Albuterol 2.5 mg neb given for bronchodilation.  The results of this test meet the ATS standards for acceptability and repeatability. PT returned to baseline and left in no distress.    Fina Talley, RT  8/8/2022

## 2022-08-18 ENCOUNTER — OFFICE VISIT (OUTPATIENT)
Dept: ENDOCRINOLOGY | Facility: CLINIC | Age: 72
End: 2022-08-18
Payer: COMMERCIAL

## 2022-08-18 VITALS — SYSTOLIC BLOOD PRESSURE: 130 MMHG | WEIGHT: 315 LBS | BODY MASS INDEX: 53.95 KG/M2 | DIASTOLIC BLOOD PRESSURE: 82 MMHG

## 2022-08-18 DIAGNOSIS — Z79.4 TYPE 2 DIABETES MELLITUS WITH OTHER CIRCULATORY COMPLICATION, WITH LONG-TERM CURRENT USE OF INSULIN (H): Primary | ICD-10-CM

## 2022-08-18 DIAGNOSIS — E11.59 TYPE 2 DIABETES MELLITUS WITH OTHER CIRCULATORY COMPLICATION, WITH LONG-TERM CURRENT USE OF INSULIN (H): Primary | ICD-10-CM

## 2022-08-18 PROCEDURE — 99213 OFFICE O/P EST LOW 20 MIN: CPT | Performed by: NURSE PRACTITIONER

## 2022-08-18 NOTE — LETTER
"    8/18/2022         RE: Walt Lambert  [address] [place] [id number]        Dear Colleague,    Thank you for referring your patient, Walt Lambert, to the [facility]. Please see a copy of my visit note below.    [facility] ENDOCRINOLOGY    Diabetes Note 8/19/2022    Walt Lambert, 1950, [id number]          Reason for visit      1. Type 2 diabetes mellitus with other circulatory complication, with long-term current use of insulin (H)        HPI     Walt Lambert is a very pleasant 71 year old old male who presents for follow up.  SUMMARY:      Alvino is here today in follow-up for DM 2. Current A1c is 8.4 and down from his last at 8.6. He reports that he has been experiencing some back pain, and that it \"keeps him from getting up and going to the refrigerator\". He continues to use the T-slim insulin pump and the Ivelisse CGM. Unfortunately, we don't have either of the downloads today. He uses an average of about 100 units a day. He is also taking Trulicity, 3 mg a week. He notes that there is a shortage at present and that it took him some time to get it recently. He is also on an ACE and a Statin.     Blood glucose data:      Past Medical History     Patient Active Problem List   Diagnosis     Diabetes mellitus (H)     Morbid obesity (H)     Cardiomyopathy (H)     Chronic obstructive pulmonary disease (H)     Hyperlipidemia     Hypertension     Sleep apnea     Insulin pump status     MAHMOOD (dyspnea on exertion)     Abnormal cardiac CT angiography     CAD (coronary artery disease)     S/P CABG (coronary artery bypass graft)     ARF (acute respiratory failure) (H)     Anemia due to blood loss, acute     Arteriosclerosis of coronary artery bypass graft     Body mass index (BMI) 50.0-59.9, adult     Pure hypertriglyceridemia     Peripheral venous insufficiency     Lymphedema     Chronic kidney disease, stage 1        Family History       family history " includes Colon Cancer in his sister; Heart Failure in his mother; Leukemia in his father and mother.    Social History      reports that he has quit smoking. He has never used smokeless tobacco. He reports current alcohol use. He reports that he does not use drugs.      Review of Systems     Patient has no polyuria or polydipsia, no chest pain, dyspnea or TIA's, no numbness, tingling or pain in extremities  Remainder negative except as noted in HPI.      Vital Signs     /82 (BP Location: Right arm, Patient Position: Sitting, Cuff Size: Adult Large)   Wt (!) 170.6 kg (376 lb)   BMI 53.95 kg/m    Wt Readings from Last 3 Encounters:   08/18/22 (!) 170.6 kg (376 lb)   07/06/22 (!) 167.4 kg (369 lb)   05/31/22 (!) 171 kg (377 lb)       Physical Exam     Constitutional:  Well developed, Well nourished, obese  HENT:  Normocephalic,   Neck: Thyroid normal, No lymph nodes, Supple  Eyes:  PERRL, Conjunctiva pink  Respiratory:  Normal breath sounds, No respiratory distress  Cardiovascular:  Normal heart rate, Normal rhythm, No murmurs  GI:  Bowel sounds normal, Soft, No tenderness  Musculoskeletal:  No gross deformity or lesions, normal dorsalis pedis pulses  Skin: No acanthosis nigricans,  lipoatrophy and lipodystrophy present  Neurologic:  Alert & oriented x 3, nonfocal  Psychiatric:  Affect, Mood, Insight appropriate      Assessment     1. Type 2 diabetes mellitus with other circulatory complication, with long-term current use of insulin (H)        Plan     Alvino's control has improved. No changes to his settings today, mostly for lack of information. F/u with me in 6 months.           Emily Alfred NP  [facility]  8/19/2022  7:14 AM        Lab Results     Microalbumin Urine mg/dL   Date Value Ref Range Status   10/02/2020 17.87 (H) 0.00 - 1.99 mg/dL Final       Cholesterol   Date Value Ref Range Status   05/13/2022 132 <=199 mg/dL Final     Direct Measure HDL   Date Value Ref Range Status   05/13/2022 34 (L)  >=40 mg/dL Final     Comment:     HDL Cholesterol Reference Range:     0-2 years:   No reference ranges established for patients under 2 years old  at [facility] for lipid analytes.    2-8 years:  Greater than 45 mg/dL     18 years and older:   Female: Greater than or equal to 50 mg/dL   Male:   Greater than or equal to 40 mg/dL     Triglycerides   Date Value Ref Range Status   05/13/2022 454 (H) <=149 mg/dL Final             Current Medications     Outpatient Medications Prior to Visit   Medication Sig Dispense Refill     acetaminophen (TYLENOL) 325 MG tablet [ACETAMINOPHEN (TYLENOL) 325 MG TABLET] Take 2 tablets (650 mg total) by mouth every 4 (four) hours as needed.  0     ADVAIR DISKUS 250-50 mcg/dose DISKUS [ADVAIR DISKUS 250-50 MCG/DOSE DISKUS] Inhale 1 puff 2 (two) times a day.       amLODIPine (NORVASC) 10 MG tablet [AMLODIPINE (NORVASC) 10 MG TABLET] 1 tab(s)       aspirin 81 MG EC tablet [ASPIRIN 81 MG EC TABLET] Take 81 mg by mouth daily.       atorvastatin (LIPITOR) 80 MG tablet Take 1 tablet (80 mg) by mouth daily 90 tablet 3     bumetanide (BUMEX) 2 MG tablet Take 1 tablet (2 mg) by mouth daily 90 tablet 1     Continuous Blood Gluc Sensor (NextPageSTYLE ANA M 14 DAY SENSOR) MISC 1 EACH EVERY 14 DAYS 6 each 1     Digital Therapy (HELLO HEART BLOOD PRESSURE) KIT        Dulaglutide (TRULICITY) 3 MG/0.5ML SOPN Inject 3 mg Subcutaneous once a week 6 mL 3     lisinopril (ZESTRIL) 20 MG tablet Take 1 tablet (20 mg) by mouth 2 times daily 180 tablet 3     metoprolol tartrate (LOPRESSOR) 100 MG tablet Take 1 tablet (100 mg) by mouth 2 times daily 180 tablet 3     multivitamin (ONE A DAY) per tablet [MULTIVITAMIN (ONE A DAY) PER TABLET] Take 1 tablet by mouth daily.       nitroglycerin (NITROSTAT) 0.4 MG SL tablet [NITROGLYCERIN (NITROSTAT) 0.4 MG SL TABLET] Place 1 tablet (0.4 mg total) under the tongue every 5 (five) minutes as needed for chest pain (chest discomfort). Take 1 tablet sublingual for  chest symptoms and allow to dissolve under tongue without swallowing.  If symptoms do not resolve in 5 minutes, repeat dose and contact EMS by calling 911.  Continue to repeat dose sublingual every 5 minutes for total 4 doses. 1 Bottle 0     NOVOLOG VIAL 100 UNIT/ML soln INJECT 375 UNITS OF INSULIN DAILY VIA THE PUMP 340 mL 1     omeprazole (PRILOSEC) 20 MG capsule [OMEPRAZOLE (PRILOSEC) 20 MG CAPSULE] Take 20 mg by mouth daily.       simvastatin (ZOCOR) 20 MG tablet Take 20 mg by mouth       triamcinolone (KENALOG) 0.025 % cream [TRIAMCINOLONE (KENALOG) 0.025 % CREAM] Apply 1 application topically 3 (three) times a day as needed.        No facility-administered medications prior to visit.               Again, thank you for allowing me to participate in the care of your patient.        Sincerely,        Emily Alfred NP

## 2022-08-29 DIAGNOSIS — E11.59 TYPE 2 DIABETES MELLITUS WITH OTHER CIRCULATORY COMPLICATION, WITH LONG-TERM CURRENT USE OF INSULIN (H): ICD-10-CM

## 2022-08-29 DIAGNOSIS — E11.9 DIABETES MELLITUS (H): ICD-10-CM

## 2022-08-29 DIAGNOSIS — Z79.4 TYPE 2 DIABETES MELLITUS WITH OTHER CIRCULATORY COMPLICATION, WITH LONG-TERM CURRENT USE OF INSULIN (H): ICD-10-CM

## 2022-08-29 RX ORDER — FLASH GLUCOSE SENSOR
KIT MISCELLANEOUS
Qty: 6 EACH | Refills: 1 | Status: SHIPPED | OUTPATIENT
Start: 2022-08-29 | End: 2023-07-11

## 2022-08-29 RX ORDER — INSULIN ASPART 100 [IU]/ML
INJECTION, SOLUTION INTRAVENOUS; SUBCUTANEOUS
Qty: 340 ML | Refills: 1 | OUTPATIENT
Start: 2022-08-29

## 2022-08-30 ENCOUNTER — OFFICE VISIT (OUTPATIENT)
Dept: PULMONOLOGY | Facility: OTHER | Age: 72
End: 2022-08-30
Attending: INTERNAL MEDICINE
Payer: COMMERCIAL

## 2022-08-30 VITALS
HEIGHT: 70 IN | HEART RATE: 74 BPM | OXYGEN SATURATION: 94 % | WEIGHT: 315 LBS | DIASTOLIC BLOOD PRESSURE: 80 MMHG | BODY MASS INDEX: 45.1 KG/M2 | SYSTOLIC BLOOD PRESSURE: 126 MMHG

## 2022-08-30 DIAGNOSIS — R06.09 DOE (DYSPNEA ON EXERTION): ICD-10-CM

## 2022-08-30 PROCEDURE — 99204 OFFICE O/P NEW MOD 45 MIN: CPT | Performed by: INTERNAL MEDICINE

## 2022-08-30 NOTE — PATIENT INSTRUCTIONS
1) You do have a little asthma like symptoms and your advair isn't necessarily a bad choice.  I would stick with it for now until we learn a little more.  2) I'd like to do a 6 minute walk test to clinically document your heart rate, oxygen, and exercise capacity   3) Measure your own heart rate and oxygen levels when you walk and do activities that make you short of breath.  The lower number is 88%, if you can keep it above this, it is safe to keep exercising  4) I think there are plenty of potential reasons you feel the way you do, and I think a little bit of all of them are conspiring on you.  We will tackle them piece meal and be sure we don't need to do more beyond lose weight and get exercise

## 2022-08-30 NOTE — PROGRESS NOTES
Assessment and Plan:Walt Lambert is a 71 year old male with a past medical history significant for obesity, CHF, T2DM, YANE who presents to clinic today for evaluation of dyspnea.  He has multiple potential reasons for his dyspnea that are likely contributing to his symptoms.  Obesity, aggressive rate control with beta blockers, CHF, and mild asthma are likely all contributing.  The one test that I think will be most useful is a 6 minute walk test to measure his oxygen sats, endurance, and heart rate.  If he is desaturating despite relatively good PFTs he may have poor cardiac performance or a PFO, OR dynamic bronchospasm.  1. CHF with history of Afib - on toprol 100 BID, measure heart rate with exercise.  Consider stress ECHO as next step of dyspnea workup  2. Hx of CABG - diaphragm function seems preserved on CXR and with relatively preserved PFTs  3. Asthma - likely does have mild asthma, on advair whic is appropriate, would not change this at this time.  Asked him to rinse his mouth out after use of advair.  4. Obesity - clearly contributing regardless, need to be sure he can safely exercise or consider a weight loss program.  5. RTC in 3 months.  Will measure home oxygen levels with ambulation and heart rates to add to the 6MWT data      CCx: dyspnea    HPI: Mr. Lambert is a 71 year old with a complex medical history who presents with a complaint of dyspnea on exertion.  This has been going on for years now, but most notably since his quadruple bypass in Feb 2020.  He has morbid obesity and YANE and admits his dyspnea was developing before his surgery.  He doesn't think it is getting worse, but he wants to know if this is due to a pathologic condition.  He has noted his sats at home drop into the 80s when he is exerting himself, but doesn't have a lot of data on this.  He doesn't cough or wheeze.  He has no chest pain.  He will get winded with a flight of steps or walking a block.  He can do 3 flights of steps  at most.  He has problems with leg swelling and is on water pills.  He has also been on toprol 100 BID since his cardiac surgery.  He has been on advair for many years based on his dyspnea and thoughts from his primary team.  He isn't sure the advair does anything but he remains on it twice a day.  He doesn't have albuterol.  He does think he has seasonal allergies, but doesn't think these impact his breathing.    He is a distant smoker having quit over 30 years ago.  He has had YANE for many years and is compliant with CPAP.    PMH:  Past Medical History:   Diagnosis Date     Asthma      Chronic obstructive pulmonary disease (H) 9/29/2019     Diabetes mellitus (H)     insulin pump     Diabetic neuropathy (H)      GERD (gastroesophageal reflux disease)      Hyperlipidemia      Hypertension      Morbid obesity (H)      Sleep apnea     uses CPAP       PSH:  Past Surgical History:   Procedure Laterality Date     COLONOSCOPY N/A 9/20/2017    Procedure: COLONOSCOPY;  Surgeon: Michael Fagan MD;  Location: [facility];  Service:      CV CORONARY ANGIOGRAM N/A 2/19/2020    Procedure: Coronary Angiogram;  Surgeon: Daniel Hayden MD;  Location: [facility];  Service: Cardiology     CV LEFT HEART CATHETERIZATION WITH LEFT VENTRICULOGRAM N/A 2/19/2020    Procedure: Left Heart Catheterization with Left Ventriculogram;  Surgeon: Daniel Hayden MD;  Location: [facility];  Service: Cardiology     GALLBLADDER SURGERY         SH:  Social History     Socioeconomic History     Marital status:      Spouse name: Not on file     Number of children: Not on file     Years of education: Not on file     Highest education level: Not on file   Occupational History     Not on file   Tobacco Use     Smoking status: Former Smoker     Smokeless tobacco: Never Used     Tobacco comment: quit 40 years ago   Substance and Sexual Activity     Alcohol use: Yes     Comment: Alcoholic Drinks/day: one beer every two  weeks      Drug use: No     Sexual activity: Not on file   Other Topics Concern     Not on file   Social History Narrative     Not on file     Social Determinants of Health     Financial Resource Strain: Not on file   Food Insecurity: Not on file   Transportation Needs: Not on file   Physical Activity: Not on file   Stress: Not on file   Social Connections: Not on file   Intimate Partner Violence: Not on file   Housing Stability: Not on file       Family history:  Family History   Problem Relation Age of Onset     Leukemia Mother      Heart Failure Mother      Leukemia Father      Colon Cancer Sister      The family history was reviewed and is not pertinent to the chief complaint or HPI.    ROS:  Review of Systems - History obtained from the patient  General ROS: negative  Psychological ROS: negative  ENT ROS: negative  Allergy and Immunology ROS: negative  Endocrine ROS: negative  Respiratory ROS: positive for - shortness of breath  negative for - cough, hemoptysis, orthopnea, pleuritic pain, stridor, tachypnea or wheezing  Cardiovascular ROS: no chest pain or palpitations  Gastrointestinal ROS: no abdominal pain, change in bowel habits, or black or bloody stools  Genito-Urinary ROS: no dysuria, trouble voiding, or hematuria  Musculoskeletal ROS: negative  Neurological ROS: no TIA or stroke symptoms  Dermatological ROS: negative      Current Meds:  Current Outpatient Medications   Medication Sig Dispense Refill     ADVAIR DISKUS 250-50 mcg/dose DISKUS [ADVAIR DISKUS 250-50 MCG/DOSE DISKUS] Inhale 1 puff 2 (two) times a day.       amLODIPine (NORVASC) 10 MG tablet [AMLODIPINE (NORVASC) 10 MG TABLET] 1 tab(s)       aspirin 81 MG EC tablet [ASPIRIN 81 MG EC TABLET] Take 81 mg by mouth daily.       atorvastatin (LIPITOR) 80 MG tablet Take 1 tablet (80 mg) by mouth daily 90 tablet 3     bumetanide (BUMEX) 2 MG tablet Take 1 tablet (2 mg) by mouth daily 90 tablet 1     Continuous Blood Gluc Sensor (FREESTYLE ANA M 14 DAY  "SENSOR) MISC CHANGE EVERY 14 DAYS AS DIRECTED 6 each 1     Digital Therapy (HELLO HEART BLOOD PRESSURE) KIT        Dulaglutide (TRULICITY) 3 MG/0.5ML SOPN Inject 3 mg Subcutaneous once a week 6 mL 3     lisinopril (ZESTRIL) 20 MG tablet Take 1 tablet (20 mg) by mouth 2 times daily 180 tablet 3     metoprolol tartrate (LOPRESSOR) 100 MG tablet Take 1 tablet (100 mg) by mouth 2 times daily 180 tablet 3     multivitamin (ONE A DAY) per tablet [MULTIVITAMIN (ONE A DAY) PER TABLET] Take 1 tablet by mouth daily.       nitroglycerin (NITROSTAT) 0.4 MG SL tablet [NITROGLYCERIN (NITROSTAT) 0.4 MG SL TABLET] Place 1 tablet (0.4 mg total) under the tongue every 5 (five) minutes as needed for chest pain (chest discomfort). Take 1 tablet sublingual for chest symptoms and allow to dissolve under tongue without swallowing.  If symptoms do not resolve in 5 minutes, repeat dose and contact EMS by calling 911.  Continue to repeat dose sublingual every 5 minutes for total 4 doses. 1 Bottle 0     NOVOLOG VIAL 100 UNIT/ML soln INJECT 375 UNITS OF INSULIN DAILY VIA THE PUMP 340 mL 1     omeprazole (PRILOSEC) 20 MG capsule [OMEPRAZOLE (PRILOSEC) 20 MG CAPSULE] Take 20 mg by mouth daily.       simvastatin (ZOCOR) 20 MG tablet Take 20 mg by mouth       triamcinolone (KENALOG) 0.025 % cream [TRIAMCINOLONE (KENALOG) 0.025 % CREAM] Apply 1 application topically 3 (three) times a day as needed.          Labs:  @clab@    I have personally reviewed all imaging and PFT data available pertinent to this visit.    Imaging studies:  No results found.    PFTs:  From early August 2022, Possible mild obstruction with air trapping and borderline reversibility with albuterol.  Normal DLCO and TLC.    Physical Exam:  /80 (BP Location: Right arm, Patient Position: Chair, Cuff Size: Adult Large)   Pulse 74   Ht 1.778 m (5' 10\")   Wt (!) 165.6 kg (365 lb)   SpO2 94%   BMI 52.37 kg/m    General - Well nourished, obese  Ears/Mouth - Deferred given " mask use during pandemic  Neck - no cervical lymphadenopathy  Lungs - Clear to ausculation bilaterally   CVS - regular rhythm with no murmurs, rubs or gallups  Abdomen - soft, NT, ND, NABS  Ext - no cyanosis, clubbing or edema  Skin - no rash  Psychology - alert and oriented, answers appropriate        Electronically signed by:    John Galeas MD PhD  [facility] Pulmonary and Critical Care Medicine

## 2022-08-30 NOTE — LETTER
8/30/2022       RE: Walt Lambert  [address] [place] [address]     Dear Colleague,    Thank you for referring your patient, Walt Lambert, to the [facility]. Please see a copy of my visit note below.    Assessment and Plan:Walt Lambert is a 71 year old male with a past medical history significant for obesity, CHF, T2DM, YANE who presents to clinic today for evaluation of dyspnea.  He has multiple potential reasons for his dyspnea that are likely contributing to his symptoms.  Obesity, aggressive rate control with beta blockers, CHF, and mild asthma are likely all contributing.  The one test that I think will be most useful is a 6 minute walk test to measure his oxygen sats, endurance, and heart rate.  If he is desaturating despite relatively good PFTs he may have poor cardiac performance or a PFO, OR dynamic bronchospasm.  1. CHF with history of Afib - on toprol 100 BID, measure heart rate with exercise.  Consider stress ECHO as next step of dyspnea workup  2. Hx of CABG - diaphragm function seems preserved on CXR and with relatively preserved PFTs  3. Asthma - likely does have mild asthma, on advair whic is appropriate, would not change this at this time.  Asked him to rinse his mouth out after use of advair.  4. Obesity - clearly contributing regardless, need to be sure he can safely exercise or consider a weight loss program.  5. RTC in 3 months.  Will measure home oxygen levels with ambulation and heart rates to add to the 6MWT data      CCx: dyspnea    HPI: Mr. Lambert is a 71 year old with a complex medical history who presents with a complaint of dyspnea on exertion.  This has been going on for years now, but most notably since his quadruple bypass in Feb 2020.  He has morbid obesity and YANE and admits his dyspnea was developing before his surgery.  He doesn't think it is getting worse, but he wants  to know if this is due to a pathologic condition.  He has noted his sats at home drop into the 80s when he is exerting himself, but doesn't have a lot of data on this.  He doesn't cough or wheeze.  He has no chest pain.  He will get winded with a flight of steps or walking a block.  He can do 3 flights of steps at most.  He has problems with leg swelling and is on water pills.  He has also been on toprol 100 BID since his cardiac surgery.  He has been on advair for many years based on his dyspnea and thoughts from his primary team.  He isn't sure the advair does anything but he remains on it twice a day.  He doesn't have albuterol.  He does think he has seasonal allergies, but doesn't think these impact his breathing.    He is a distant smoker having quit over 30 years ago.  He has had YANE for many years and is compliant with CPAP.    PMH:  Past Medical History:   Diagnosis Date     Asthma      Chronic obstructive pulmonary disease (H) 9/29/2019     Diabetes mellitus (H)     insulin pump     Diabetic neuropathy (H)      GERD (gastroesophageal reflux disease)      Hyperlipidemia      Hypertension      Morbid obesity (H)      Sleep apnea     uses CPAP       PSH:  Past Surgical History:   Procedure Laterality Date     COLONOSCOPY N/A 9/20/2017    Procedure: COLONOSCOPY;  Surgeon: Michael Fagan MD;  Location: [facility];  Service:      CV CORONARY ANGIOGRAM N/A 2/19/2020    Procedure: Coronary Angiogram;  Surgeon: Daniel Hayden MD;  Location: [facility];  Service: Cardiology     CV LEFT HEART CATHETERIZATION WITH LEFT VENTRICULOGRAM N/A 2/19/2020    Procedure: Left Heart Catheterization with Left Ventriculogram;  Surgeon: Daniel Hayden MD;  Location: [facility];  Service: Cardiology     GALLBLADDER SURGERY         SH:  Social History     Socioeconomic History     Marital status:      Spouse name: Not on file     Number of children: Not on file     Years of education: Not on  file     Highest education level: Not on file   Occupational History     Not on file   Tobacco Use     Smoking status: Former Smoker     Smokeless tobacco: Never Used     Tobacco comment: quit 40 years ago   Substance and Sexual Activity     Alcohol use: Yes     Comment: Alcoholic Drinks/day: one beer every two weeks      Drug use: No     Sexual activity: Not on file   Other Topics Concern     Not on file   Social History Narrative     Not on file     Social Determinants of Health     Financial Resource Strain: Not on file   Food Insecurity: Not on file   Transportation Needs: Not on file   Physical Activity: Not on file   Stress: Not on file   Social Connections: Not on file   Intimate Partner Violence: Not on file   Housing Stability: Not on file       Family history:  Family History   Problem Relation Age of Onset     Leukemia Mother      Heart Failure Mother      Leukemia Father      Colon Cancer Sister      The family history was reviewed and is not pertinent to the chief complaint or HPI.    ROS:  Review of Systems - History obtained from the patient  General ROS: negative  Psychological ROS: negative  ENT ROS: negative  Allergy and Immunology ROS: negative  Endocrine ROS: negative  Respiratory ROS: positive for - shortness of breath  negative for - cough, hemoptysis, orthopnea, pleuritic pain, stridor, tachypnea or wheezing  Cardiovascular ROS: no chest pain or palpitations  Gastrointestinal ROS: no abdominal pain, change in bowel habits, or black or bloody stools  Genito-Urinary ROS: no dysuria, trouble voiding, or hematuria  Musculoskeletal ROS: negative  Neurological ROS: no TIA or stroke symptoms  Dermatological ROS: negative      Current Meds:  Current Outpatient Medications   Medication Sig Dispense Refill     ADVAIR DISKUS 250-50 mcg/dose DISKUS [ADVAIR DISKUS 250-50 MCG/DOSE DISKUS] Inhale 1 puff 2 (two) times a day.       amLODIPine (NORVASC) 10 MG tablet [AMLODIPINE (NORVASC) 10 MG TABLET] 1 tab(s)        aspirin 81 MG EC tablet [ASPIRIN 81 MG EC TABLET] Take 81 mg by mouth daily.       atorvastatin (LIPITOR) 80 MG tablet Take 1 tablet (80 mg) by mouth daily 90 tablet 3     bumetanide (BUMEX) 2 MG tablet Take 1 tablet (2 mg) by mouth daily 90 tablet 1     Continuous Blood Gluc Sensor (FREESTYLE ANA M 14 DAY SENSOR) MISC CHANGE EVERY 14 DAYS AS DIRECTED 6 each 1     Digital Therapy (HELLO HEART BLOOD PRESSURE) KIT        Dulaglutide (TRULICITY) 3 MG/0.5ML SOPN Inject 3 mg Subcutaneous once a week 6 mL 3     lisinopril (ZESTRIL) 20 MG tablet Take 1 tablet (20 mg) by mouth 2 times daily 180 tablet 3     metoprolol tartrate (LOPRESSOR) 100 MG tablet Take 1 tablet (100 mg) by mouth 2 times daily 180 tablet 3     multivitamin (ONE A DAY) per tablet [MULTIVITAMIN (ONE A DAY) PER TABLET] Take 1 tablet by mouth daily.       nitroglycerin (NITROSTAT) 0.4 MG SL tablet [NITROGLYCERIN (NITROSTAT) 0.4 MG SL TABLET] Place 1 tablet (0.4 mg total) under the tongue every 5 (five) minutes as needed for chest pain (chest discomfort). Take 1 tablet sublingual for chest symptoms and allow to dissolve under tongue without swallowing.  If symptoms do not resolve in 5 minutes, repeat dose and contact EMS by calling 911.  Continue to repeat dose sublingual every 5 minutes for total 4 doses. 1 Bottle 0     NOVOLOG VIAL 100 UNIT/ML soln INJECT 375 UNITS OF INSULIN DAILY VIA THE PUMP 340 mL 1     omeprazole (PRILOSEC) 20 MG capsule [OMEPRAZOLE (PRILOSEC) 20 MG CAPSULE] Take 20 mg by mouth daily.       simvastatin (ZOCOR) 20 MG tablet Take 20 mg by mouth       triamcinolone (KENALOG) 0.025 % cream [TRIAMCINOLONE (KENALOG) 0.025 % CREAM] Apply 1 application topically 3 (three) times a day as needed.          Labs:  @clab@    I have personally reviewed all imaging and PFT data available pertinent to this visit.    Imaging studies:  No results found.    PFTs:  From early August 2022, Possible mild obstruction with air trapping and borderline  "reversibility with albuterol.  Normal DLCO and TLC.    Physical Exam:  /80 (BP Location: Right arm, Patient Position: Chair, Cuff Size: Adult Large)   Pulse 74   Ht 1.778 m (5' 10\")   Wt (!) 165.6 kg (365 lb)   SpO2 94%   BMI 52.37 kg/m    General - Well nourished, obese  Ears/Mouth - Deferred given mask use during pandemic  Neck - no cervical lymphadenopathy  Lungs - Clear to ausculation bilaterally   CVS - regular rhythm with no murmurs, rubs or gallups  Abdomen - soft, NT, ND, NABS  Ext - no cyanosis, clubbing or edema  Skin - no rash  Psychology - alert and oriented, answers appropriate        Electronically signed by:    John Galeas MD PhD  [facility] Pulmonary and Critical Care Medicine      Again, thank you for allowing me to participate in the care of your patient.      Sincerely,    John Galeas MD    "

## 2022-09-22 ENCOUNTER — TELEPHONE (OUTPATIENT)
Dept: ENDOCRINOLOGY | Facility: CLINIC | Age: 72
End: 2022-09-22

## 2022-09-22 ENCOUNTER — MEDICAL CORRESPONDENCE (OUTPATIENT)
Dept: HEALTH INFORMATION MANAGEMENT | Facility: CLINIC | Age: 72
End: 2022-09-22

## 2022-09-22 NOTE — TELEPHONE ENCOUNTER
Faxed completed Statement of Medical Necessity and Prescription order for Tandem Insulin Pump along with signed and dated last clinic visit dated 8/18/2022 with Emily Alfred CNP to [facility] at fax# [phone].  Patient informed via PRX message.

## 2022-09-25 ENCOUNTER — HEALTH MAINTENANCE LETTER (OUTPATIENT)
Age: 72
End: 2022-09-25

## 2022-09-26 DIAGNOSIS — Z79.4 TYPE 2 DIABETES MELLITUS WITH OTHER CIRCULATORY COMPLICATION, WITH LONG-TERM CURRENT USE OF INSULIN (H): ICD-10-CM

## 2022-09-26 DIAGNOSIS — E11.59 TYPE 2 DIABETES MELLITUS WITH OTHER CIRCULATORY COMPLICATION, WITH LONG-TERM CURRENT USE OF INSULIN (H): ICD-10-CM

## 2022-09-26 RX ORDER — INSULIN ASPART 100 [IU]/ML
INJECTION, SOLUTION INTRAVENOUS; SUBCUTANEOUS
Qty: 340 ML | Refills: 1 | Status: SHIPPED | OUTPATIENT
Start: 2022-09-26 | End: 2022-11-10

## 2022-09-29 ENCOUNTER — HOSPITAL ENCOUNTER (OUTPATIENT)
Dept: RESPIRATORY THERAPY | Facility: CLINIC | Age: 72
Discharge: HOME | End: 2022-09-29
Attending: INTERNAL MEDICINE | Admitting: INTERNAL MEDICINE
Payer: COMMERCIAL

## 2022-09-29 DIAGNOSIS — R06.09 DOE (DYSPNEA ON EXERTION): ICD-10-CM

## 2022-09-29 PROCEDURE — 94618 PULMONARY STRESS TESTING: CPT | Mod: 26 | Performed by: INTERNAL MEDICINE

## 2022-09-29 PROCEDURE — 94618 PULMONARY STRESS TESTING: CPT

## 2022-09-29 PROCEDURE — 999N000157 HC STATISTIC RCP TIME EA 10 MIN

## 2022-09-29 NOTE — PROGRESS NOTES
Six Minute Walk Test: See scanned test    Six Minute Walk Test:       Probe: Finger Stops: 0      Patient walked 4500Ft / 1371.60m in 6 minutes.  LLN = 1035.62 Ft / 315.66m     Oxygen during the test NO     O2 system: none     Pre-walk: 02 Saturation 94% Heart Rate 75 Juventino Dyspnea = 1 Fatigue = 3      Post-walk: 02 Saturation 95% Heart Rate 82 Ju[place]ino Dyspnea = 6 Fatigue = 6     Lowest 02 saturation during the 6-minute walk 90%     Max heart rate during walk 107     Recovery time to baseline 02 SAT 1 minute and HR 1 minute.     Patient reports walk distance may have been affected by nothing.     Adrianna Wolfe, RT

## 2022-10-04 ENCOUNTER — MEDICAL CORRESPONDENCE (OUTPATIENT)
Dept: HEALTH INFORMATION MANAGEMENT | Facility: CLINIC | Age: 72
End: 2022-10-04

## 2022-10-04 LAB — FIO2-PRE: 21 %

## 2022-10-06 ENCOUNTER — MEDICAL CORRESPONDENCE (OUTPATIENT)
Dept: HEALTH INFORMATION MANAGEMENT | Facility: CLINIC | Age: 72
End: 2022-10-06

## 2022-10-07 DIAGNOSIS — Z79.4 TYPE 2 DIABETES MELLITUS WITH OTHER CIRCULATORY COMPLICATION, WITH LONG-TERM CURRENT USE OF INSULIN (H): Primary | ICD-10-CM

## 2022-10-07 DIAGNOSIS — E11.59 TYPE 2 DIABETES MELLITUS WITH OTHER CIRCULATORY COMPLICATION, WITH LONG-TERM CURRENT USE OF INSULIN (H): Primary | ICD-10-CM

## 2022-10-12 ENCOUNTER — TELEPHONE (OUTPATIENT)
Dept: ENDOCRINOLOGY | Facility: CLINIC | Age: 72
End: 2022-10-12

## 2022-10-12 NOTE — TELEPHONE ENCOUNTER
Received a faxed request from [facility] - Representative, Tonie Brar.  Tonie states they are processing a new insulin pump request on behalf of the patient.  Please fax the following back to Tonie at fax # [phone], ph# [phone].    -Completed LMN  -Copy of a concurrent fasting blood glucose and C-peptide drawn together.  If the patient has not had this done, the patient will need to.  NOTE:  The labs will need to indicate they are fasting at the time of the blood draw.  -Copy of the most recent progress note.

## 2022-10-12 NOTE — TELEPHONE ENCOUNTER
LMN was completed and signed by provider prior to her leaving on vacation.  LMN and last clinic note faxed to [facility] on 10/12/22 so they can begin to process request.   Last clinic note dated 8/18/22 may need providers signature when she returns but faxed already as no signature was asked for in the initial paperwork.  On review of chart, patient will need to complete a concurrent fasting glucose with C-peptide.Provider is out of the office currently returning on 10/18/22.      Left message for patient to return call.  Need to inform patient of the status of this request and the need for FASTING LABS.  Lab orders are already in the chart.    Once labs completed and last clinic note signed, can fax all forms and labs to Tonie at [facility] fax [phone].   Forms in Providers assigned bin/mail slot in the core in [place] on hold pending lab results and return of provider to office.

## 2022-10-13 ENCOUNTER — LAB (OUTPATIENT)
Dept: LAB | Facility: CLINIC | Age: 72
End: 2022-10-13
Payer: COMMERCIAL

## 2022-10-13 DIAGNOSIS — E11.59 TYPE 2 DIABETES MELLITUS WITH OTHER CIRCULATORY COMPLICATION, WITH LONG-TERM CURRENT USE OF INSULIN (H): ICD-10-CM

## 2022-10-13 DIAGNOSIS — Z79.4 TYPE 2 DIABETES MELLITUS WITH OTHER CIRCULATORY COMPLICATION, WITH LONG-TERM CURRENT USE OF INSULIN (H): ICD-10-CM

## 2022-10-13 LAB
C PEPTIDE SERPL-MCNC: 5.7 NG/ML (ref 0.9–6.9)
FASTING STATUS PATIENT QL REPORTED: YES
GLUCOSE SERPL-MCNC: 189 MG/DL (ref 70–99)

## 2022-10-13 PROCEDURE — 36415 COLL VENOUS BLD VENIPUNCTURE: CPT

## 2022-10-13 PROCEDURE — 82947 ASSAY GLUCOSE BLOOD QUANT: CPT

## 2022-10-13 PROCEDURE — 84681 ASSAY OF C-PEPTIDE: CPT

## 2022-11-08 DIAGNOSIS — Z95.1 S/P CABG (CORONARY ARTERY BYPASS GRAFT): ICD-10-CM

## 2022-11-08 RX ORDER — METOPROLOL TARTRATE 100 MG
100 TABLET ORAL 2 TIMES DAILY
Qty: 180 TABLET | Refills: 3 | Status: SHIPPED | OUTPATIENT
Start: 2022-11-08

## 2022-11-08 RX ORDER — ATORVASTATIN CALCIUM 80 MG/1
80 TABLET, FILM COATED ORAL DAILY
Qty: 90 TABLET | Refills: 3 | Status: SHIPPED | OUTPATIENT
Start: 2022-11-08

## 2022-11-10 ENCOUNTER — TELEPHONE (OUTPATIENT)
Dept: ENDOCRINOLOGY | Facility: CLINIC | Age: 72
End: 2022-11-10

## 2022-11-10 DIAGNOSIS — Z79.4 TYPE 2 DIABETES MELLITUS WITH OTHER CIRCULATORY COMPLICATION, WITH LONG-TERM CURRENT USE OF INSULIN (H): ICD-10-CM

## 2022-11-10 DIAGNOSIS — E11.59 TYPE 2 DIABETES MELLITUS WITH OTHER CIRCULATORY COMPLICATION, WITH LONG-TERM CURRENT USE OF INSULIN (H): ICD-10-CM

## 2022-11-10 RX ORDER — PREDNISONE 20 MG/1
TABLET ORAL
COMMUNITY
Start: 2019-01-26 | End: 2022-12-20

## 2022-11-10 RX ORDER — INSULIN ASPART 100 [IU]/ML
INJECTION, SOLUTION INTRAVENOUS; SUBCUTANEOUS
Qty: 370 ML | Refills: 1 | Status: SHIPPED | OUTPATIENT
Start: 2022-11-10 | End: 2023-03-09

## 2022-11-10 NOTE — TELEPHONE ENCOUNTER
Health Call Center    Phone Message    May a detailed message be left on voicemail: yes     Reason for Call: Medication Question or concern regarding medication   Prescription Clarification  Name of Medication: NOVOLOG VIAL 100 UNIT/ML soln  Prescribing Provider: Emily Alfred NP   Pharmacy: [facility]/PHARMACY #[id number] [place], [place] - [address] AT [facility] & [place]   What on the order needs clarification? Patient called to request a new prescription for his insulin. He would like a copy of this sent to him and not sent through to the pharmacy. He states that he will be ordering this through [facility]. He requested his prescription be emailed to him if possible. He did verify that his email on file is correct. Please follow up. Thank you.          Action Taken: Other: Endo    Travel Screening: Not Applicable

## 2022-12-20 ENCOUNTER — OFFICE VISIT (OUTPATIENT)
Dept: PULMONOLOGY | Facility: CLINIC | Age: 72
End: 2022-12-20
Payer: COMMERCIAL

## 2022-12-20 VITALS
OXYGEN SATURATION: 95 % | DIASTOLIC BLOOD PRESSURE: 74 MMHG | WEIGHT: 315 LBS | BODY MASS INDEX: 53.52 KG/M2 | HEART RATE: 78 BPM | SYSTOLIC BLOOD PRESSURE: 130 MMHG

## 2022-12-20 DIAGNOSIS — G47.33 OSA (OBSTRUCTIVE SLEEP APNEA): Primary | ICD-10-CM

## 2022-12-20 PROCEDURE — 99214 OFFICE O/P EST MOD 30 MIN: CPT | Performed by: INTERNAL MEDICINE

## 2022-12-20 NOTE — PROGRESS NOTES
Assessment and Plan:Walt Lambert is a 72 year old male with a past medical history significant for Afib, CAD, YANE, presumed asthma who presents to clinic today for follow up.  His symptoms are more or less the same.  His 6 minute walk shows he can go the appropriate distance and did not desaturate with this.  His heart rate reached 107 but quickly dropped to 82 despite being very short of breath at the end.  I suspect he may be a little over rate controlled, but this is not the entire story for his dyspnea.  We discussed his obese and abdominal fat are creating his issues with bending over.  He is overdue for a follow up of his sleep apnea which may not be optimally controlled as it has been many years since his sleep study, and he has gained a lot of weight since then.    1) Dyspnea - due to obesity and deconditioning.  May be a role for asthma and heart failure but these are managed.  Encourage exercise in whatever way he can  2) Asthma - stable, likely not playing a big role in breathing issues.  Ok to wean off of advair if he wants to try this.  3) YANE - refer to sleep center to retest and verify proper settings and mask fitment  4) RTC in 6 months          CCx: dyspnea    HPI: Mr. Lambert returns to discuss his dyspnea.  He is a little better and is now taking the steps more often.  He did do a 6 minute walk recently for us.  He doesn't think the advair is working much for him.  He still notes a lot of dyspnea bending over to tie his shoes or with going up and down the steps.  He denies cough or wheezing.  He wears his CPAP everynight and takes his advair everyday.    ROS:  Review of Systems - History obtained from the patient  General ROS: negative  Psychological ROS: negative  ENT ROS: negative  Allergy and Immunology ROS: negative  Endocrine ROS: negative  Respiratory ROS: positive for - shortness of breath  negative for - cough, hemoptysis, pleuritic pain, stridor or tachypnea  Cardiovascular ROS: no  chest pain or palpitations  Gastrointestinal ROS: no abdominal pain, change in bowel habits, or black or bloody stools  Genito-Urinary ROS: no dysuria, trouble voiding, or hematuria  Musculoskeletal ROS: negative  Neurological ROS: no TIA or stroke symptoms  Dermatological ROS: negative      Current Meds:  Current Outpatient Medications   Medication Sig Dispense Refill     ADVAIR DISKUS 250-50 mcg/dose DISKUS [ADVAIR DISKUS 250-50 MCG/DOSE DISKUS] Inhale 1 puff 2 (two) times a day.       amLODIPine (NORVASC) 10 MG tablet [AMLODIPINE (NORVASC) 10 MG TABLET] 1 tab(s)       aspirin 81 MG EC tablet [ASPIRIN 81 MG EC TABLET] Take 81 mg by mouth daily.       atorvastatin (LIPITOR) 80 MG tablet Take 1 tablet (80 mg) by mouth daily 90 tablet 3     bumetanide (BUMEX) 2 MG tablet Take 1 tablet (2 mg) by mouth daily 90 tablet 1     Continuous Blood Gluc Sensor (FREESTYLE ANA M 14 DAY SENSOR) MISC CHANGE EVERY 14 DAYS AS DIRECTED 6 each 1     Digital Therapy (HELLO HEART BLOOD PRESSURE) KIT        Dulaglutide (TRULICITY) 3 MG/0.5ML SOPN Inject 3 mg Subcutaneous once a week 6 mL 3     insulin aspart (NOVOLOG VIAL) 100 UNITS/ML vial INJECT 375 UNITS OF INSULIN DAILY VIA THE PUMP  Strength: 100 UNIT/ mL 1     lisinopril (ZESTRIL) 20 MG tablet Take 1 tablet (20 mg) by mouth 2 times daily 180 tablet 3     metoprolol tartrate (LOPRESSOR) 100 MG tablet Take 1 tablet (100 mg) by mouth 2 times daily 180 tablet 3     multivitamin (ONE A DAY) per tablet [MULTIVITAMIN (ONE A DAY) PER TABLET] Take 1 tablet by mouth daily.       omeprazole (PRILOSEC) 20 MG capsule [OMEPRAZOLE (PRILOSEC) 20 MG CAPSULE] Take 20 mg by mouth daily.       simvastatin (ZOCOR) 20 MG tablet Take 20 mg by mouth       triamcinolone (KENALOG) 0.025 % cream [TRIAMCINOLONE (KENALOG) 0.025 % CREAM] Apply 1 application topically 3 (three) times a day as needed.          Labs:  @richie@  Lab Results   Component Value Date    WBC 10.7 03/03/2020    HGB 14.7 08/08/2022     HCT 41.6 03/03/2020     03/03/2020     08/08/2022    POTASSIUM 4.4 08/08/2022    CHLORIDE 101 08/08/2022    CO2 25 08/08/2022     (H) 10/13/2022    BUN 18 08/08/2022    CR 1.22 08/08/2022    MAG 2.0 03/02/2020    INR 1.42 (H) 02/27/2020    BILITOTAL 1.4 (H) 05/13/2022    AST 69 (H) 05/13/2022    ALT 78 (H) 05/13/2022    ALKPHOS 113 05/13/2022    PROTTOTAL 6.4 05/13/2022    ALBUMIN 3.4 (L) 05/13/2022       I have personally reviewed all pertinent imaging studies and PFT results unless otherwise noted.    Imaging studies:  No results found.      Physical Exam:  /74 (BP Location: Left arm, Patient Position: Chair, Cuff Size: Adult Large)   Pulse 78   Wt (!) 169.2 kg (373 lb)   SpO2 95%   BMI 53.52 kg/m    General - Well nourished  Ears/Mouth -  Deferred given mask use during pandemic  Neck - no cervical lymphadenopathy  Lungs - Clear to ausculation bilaterally   CVS - regular rhythm with no murmurs, rubs or gallups  Abdomen - soft, NT, ND, NABS obese  Ext - no cyanosis, clubbing or edema  Skin - no rash  Psychology - alert and oriented, answers appropriate        Electronically signed by:    John Galeas MD PhD  [facility]monary [facility]

## 2022-12-20 NOTE — PATIENT INSTRUCTIONS
1) I would talk to cardiology about possibly reducing the dose of metoprolol to see if this helps your shortness of breath  2) I am not sure if advair is making a big difference, you could consider dialing it off when you are running out of it  3) I would recommend checking back in with the sleep doctors.  I will put in a referral.    4) I strongly recommend exercise to help shed the weight. Try to get on the treadmill everyday

## 2022-12-27 ENCOUNTER — TELEPHONE (OUTPATIENT)
Dept: ENDOCRINOLOGY | Facility: CLINIC | Age: 72
End: 2022-12-27

## 2022-12-27 DIAGNOSIS — E11.59 TYPE 2 DIABETES MELLITUS WITH OTHER CIRCULATORY COMPLICATION, WITH LONG-TERM CURRENT USE OF INSULIN (H): Primary | ICD-10-CM

## 2022-12-27 DIAGNOSIS — R06.09 DOE (DYSPNEA ON EXERTION): ICD-10-CM

## 2022-12-27 DIAGNOSIS — Z79.4 TYPE 2 DIABETES MELLITUS WITH OTHER CIRCULATORY COMPLICATION, WITH LONG-TERM CURRENT USE OF INSULIN (H): Primary | ICD-10-CM

## 2022-12-27 RX ORDER — INSULIN LISPRO 100 [IU]/ML
INJECTION, SOLUTION INTRAVENOUS; SUBCUTANEOUS
Qty: 340 ML | Refills: 0 | Status: SHIPPED | OUTPATIENT
Start: 2022-12-27 | End: 2023-03-09

## 2022-12-28 RX ORDER — BUMETANIDE 2 MG/1
TABLET ORAL
Qty: 90 TABLET | Refills: 1 | Status: SHIPPED | OUTPATIENT
Start: 2022-12-28 | End: 2023-03-09

## 2023-02-13 ENCOUNTER — LAB (OUTPATIENT)
Dept: LAB | Facility: CLINIC | Age: 73
End: 2023-02-13
Payer: COMMERCIAL

## 2023-02-13 DIAGNOSIS — Z79.4 TYPE 2 DIABETES MELLITUS WITH OTHER CIRCULATORY COMPLICATION, WITH LONG-TERM CURRENT USE OF INSULIN (H): ICD-10-CM

## 2023-02-13 DIAGNOSIS — E11.59 TYPE 2 DIABETES MELLITUS WITH OTHER CIRCULATORY COMPLICATION, WITH LONG-TERM CURRENT USE OF INSULIN (H): ICD-10-CM

## 2023-02-13 LAB
ANION GAP SERPL CALCULATED.3IONS-SCNC: 13 MMOL/L (ref 7–15)
BUN SERPL-MCNC: 23.9 MG/DL (ref 8–23)
CALCIUM SERPL-MCNC: 9.3 MG/DL (ref 8.8–10.2)
CHLORIDE SERPL-SCNC: 102 MMOL/L (ref 98–107)
CREAT SERPL-MCNC: 1.19 MG/DL (ref 0.67–1.17)
DEPRECATED HCO3 PLAS-SCNC: 27 MMOL/L (ref 22–29)
GFR SERPL CREATININE-BSD FRML MDRD: 66 ML/MIN/1.73M2
GLUCOSE SERPL-MCNC: 124 MG/DL (ref 70–99)
HBA1C MFR BLD: 9.7 % (ref 0–5.6)
POTASSIUM SERPL-SCNC: 4.5 MMOL/L (ref 3.4–5.3)
SODIUM SERPL-SCNC: 142 MMOL/L (ref 136–145)

## 2023-02-13 PROCEDURE — 36415 COLL VENOUS BLD VENIPUNCTURE: CPT

## 2023-02-13 PROCEDURE — 83036 HEMOGLOBIN GLYCOSYLATED A1C: CPT

## 2023-02-13 PROCEDURE — 80048 BASIC METABOLIC PNL TOTAL CA: CPT

## 2023-02-23 ENCOUNTER — VIRTUAL VISIT (OUTPATIENT)
Dept: ENDOCRINOLOGY | Facility: CLINIC | Age: 73
End: 2023-02-23
Payer: COMMERCIAL

## 2023-02-23 DIAGNOSIS — E11.59 TYPE 2 DIABETES MELLITUS WITH OTHER CIRCULATORY COMPLICATION, WITH LONG-TERM CURRENT USE OF INSULIN (H): ICD-10-CM

## 2023-02-23 DIAGNOSIS — Z79.4 TYPE 2 DIABETES MELLITUS WITH OTHER CIRCULATORY COMPLICATION, WITH LONG-TERM CURRENT USE OF INSULIN (H): ICD-10-CM

## 2023-02-23 PROCEDURE — 99213 OFFICE O/P EST LOW 20 MIN: CPT | Mod: VID | Performed by: NURSE PRACTITIONER

## 2023-02-23 RX ORDER — DULAGLUTIDE 3 MG/.5ML
3 INJECTION, SOLUTION SUBCUTANEOUS WEEKLY
Qty: 6 ML | Refills: 3 | Status: SHIPPED | OUTPATIENT
Start: 2023-02-23 | End: 2024-03-16

## 2023-02-23 RX ORDER — INSULIN LISPRO 100 [IU]/ML
INJECTION, SOLUTION INTRAVENOUS; SUBCUTANEOUS
Qty: 36000 ML | Refills: 3 | Status: SHIPPED | OUTPATIENT
Start: 2023-02-23 | End: 2023-05-04

## 2023-02-23 ASSESSMENT — ENCOUNTER SYMPTOMS
HEMOPTYSIS: 0
NUMBNESS: 0
MEMORY LOSS: 0
TASTE DISTURBANCE: 0
NECK PAIN: 0
SMELL DISTURBANCE: 0
MUSCLE WEAKNESS: 0
WEAKNESS: 0
SNORES LOUDLY: 0
SINUS PAIN: 1
SHORTNESS OF BREATH: 1
TREMORS: 0
SEIZURES: 0
DISTURBANCES IN COORDINATION: 0
HEADACHES: 0
PARALYSIS: 0
TINGLING: 1
STIFFNESS: 0
DIZZINESS: 1
MYALGIAS: 0
COUGH DISTURBING SLEEP: 1
BACK PAIN: 1
WHEEZING: 1
TROUBLE SWALLOWING: 0
SORE THROAT: 1
COUGH: 1
MUSCLE CRAMPS: 0
JOINT SWELLING: 0
SINUS CONGESTION: 1
SPUTUM PRODUCTION: 0
LOSS OF CONSCIOUSNESS: 0
HOARSE VOICE: 0
NECK MASS: 0
DYSPNEA ON EXERTION: 1
SPEECH CHANGE: 0
ARTHRALGIAS: 0
POSTURAL DYSPNEA: 1

## 2023-02-23 NOTE — PROGRESS NOTES
"[facility] ENDOCRINOLOGY    Diabetes Note 2/23/2023    Walt Lambert, 1950, [id number]          Reason for visit      1. Type 2 diabetes mellitus with other circulatory complication, with long-term current use of insulin (H)        HPI     Walt Lambert is a very pleasant 72 year old old male who presents for follow up.  SUMMARY:    Alvino is contacted today via Video visit in f/u for DM 2. His current A1c is 9.7 and reflective of at least a month without all of his medications. His previous He reports that he was without insurance for a month and had to scramble to get insulin because it was going to be over $1000. He has also been without Trulicity because it was too expensive.   He continues to use the Medtronic 670 G insulin pump, but is in the process of transitioning to the T-Slim. He is using the Ivelisse CGM currently, but again, will transition to the Dexcom that pairs with the T-Slim. His Ivelisse download today would support his missing Trulicity, with in target time of only 11%, and basically high the rest. He did have some hypoglycemia on Berger's Day and it quite surprised him because he is \"usually indulging in chocolate on that day\".        Blood glucose data:      Past Medical History     Patient Active Problem List   Diagnosis     Diabetes mellitus (H)     Morbid obesity (H)     Cardiomyopathy (H)     Chronic obstructive pulmonary disease (H)     Hyperlipidemia     Hypertension     Sleep apnea     Insulin pump status     MAHMOOD (dyspnea on exertion)     Abnormal cardiac CT angiography     CAD (coronary artery disease)     S/P CABG (coronary artery bypass graft)     ARF (acute respiratory failure) (H)     Anemia due to blood loss, acute     Arteriosclerosis of coronary artery bypass graft     Body mass index (BMI) 50.0-59.9, adult     Pure hypertriglyceridemia     Peripheral venous insufficiency     Lymphedema     Chronic kidney disease, stage 1        Family History       family history " includes Colon Cancer in his sister; Heart Failure in his mother; Leukemia in his father and mother.    Social History      reports that he has quit smoking. He has never used smokeless tobacco. He reports current alcohol use. He reports that he does not use drugs.      Review of Systems     Patient has no polyuria or polydipsia, no chest pain, dyspnea or TIA's, no numbness, tingling or pain in extremities  Remainder negative except as noted in HPI.      Vital Signs     There were no vitals taken for this visit.  Wt Readings from Last 3 Encounters:   12/20/22 (!) 169.2 kg (373 lb)   08/30/22 (!) 165.6 kg (365 lb)   08/18/22 (!) 170.6 kg (376 lb)       Physical Exam     Constitutional:  Well developed, Well nourished  HENT:  Normocephalic,   Neck: normal in appearance  Eyes:  PERRL, Conjunctiva pink  Respiratory:  No respiratory distress  Skin: No acanthosis nigricans, lipoatrophy or lipodystrophy  Neurologic:  Alert & oriented x 3, nonfocal  Psychiatric:  Affect, Mood, Insight appropriate          Assessment     1. Type 2 diabetes mellitus with other circulatory complication, with long-term current use of insulin (H)        Plan     Will reorder the Trulicity. I would like to see his A1c back down in the low 8s. No changes to his pump settings today. F/u with me in 3 months per Medicare pumping regulations.         Emily Alfred NP  [facility]  2/23/2023  7:55 AM        Lab Results     Microalbumin Urine mg/dL   Date Value Ref Range Status   10/02/2020 17.87 (H) 0.00 - 1.99 mg/dL Final       Cholesterol   Date Value Ref Range Status   05/13/2022 132 <=199 mg/dL Final     Direct Measure HDL   Date Value Ref Range Status   05/13/2022 34 (L) >=40 mg/dL Final     Comment:     HDL Cholesterol Reference Range:     0-2 years:   No reference ranges established for patients under 2 years old  at [facility] for lipid analytes.    2-8 years:  Greater than 45 mg/dL     18 years and older:   Female: Greater  than or equal to 50 mg/dL   Male:   Greater than or equal to 40 mg/dL     Triglycerides   Date Value Ref Range Status   05/13/2022 454 (H) <=149 mg/dL Final       [unfilled]      Current Medications     Outpatient Medications Prior to Visit   Medication Sig Dispense Refill     ADVAIR DISKUS 250-50 mcg/dose DISKUS [ADVAIR DISKUS 250-50 MCG/DOSE DISKUS] Inhale 1 puff 2 (two) times a day.       amLODIPine (NORVASC) 10 MG tablet [AMLODIPINE (NORVASC) 10 MG TABLET] 1 tab(s)       aspirin 81 MG EC tablet [ASPIRIN 81 MG EC TABLET] Take 81 mg by mouth daily.       atorvastatin (LIPITOR) 80 MG tablet Take 1 tablet (80 mg) by mouth daily 90 tablet 3     bumetanide (BUMEX) 2 MG tablet TAKE 1 TABLET BY MOUTH EVERY DAY 90 tablet 1     Continuous Blood Gluc Sensor (FREESTYLE ANA M 14 DAY SENSOR) MISC CHANGE EVERY 14 DAYS AS DIRECTED 6 each 1     Digital Therapy (HELLO HEART BLOOD PRESSURE) KIT        insulin aspart (NOVOLOG VIAL) 100 UNITS/ML vial INJECT 375 UNITS OF INSULIN DAILY VIA THE PUMP  Strength: 100 UNIT/ mL 1     insulin lispro (HUMALOG VIAL) 100 UNIT/ML vial INJECT 375 UNITS OF INSULIN DAILY VIA THE PUMP 340 mL 0     lisinopril (ZESTRIL) 20 MG tablet Take 1 tablet (20 mg) by mouth 2 times daily 180 tablet 3     metoprolol tartrate (LOPRESSOR) 100 MG tablet Take 1 tablet (100 mg) by mouth 2 times daily 180 tablet 3     multivitamin (ONE A DAY) per tablet [MULTIVITAMIN (ONE A DAY) PER TABLET] Take 1 tablet by mouth daily.       omeprazole (PRILOSEC) 20 MG capsule [OMEPRAZOLE (PRILOSEC) 20 MG CAPSULE] Take 20 mg by mouth daily.       simvastatin (ZOCOR) 20 MG tablet Take 20 mg by mouth       triamcinolone (KENALOG) 0.025 % cream [TRIAMCINOLONE (KENALOG) 0.025 % CREAM] Apply 1 application topically 3 (three) times a day as needed.        Dulaglutide (TRULICITY) 3 MG/0.5ML SOPN Inject 3 mg Subcutaneous once a week 6 mL 3     No facility-administered medications prior to visit.               Video-Visit Details    Type  of service:  Video Visit    Video Start Time (time video started): 0730    Video End Time (time video stopped): 0750    Originating Location (pt. Location): Home        Distant Location (provider location):  On-site    Mode of Communication:  Video Conference via [employer]          Date of last OV: 8/18/22  Reason for Visit: DM    Blood Glucose Log:              Answers for HPI/ROS submitted by the patient on 2/23/2023  General Symptoms: No  Skin Symptoms: No  HENT Symptoms: Yes  EYE SYMPTOMS: No  HEART SYMPTOMS: No  LUNG SYMPTOMS: Yes  INTESTINAL SYMPTOMS: No  URINARY SYMPTOMS: No  REPRODUCTIVE SYMPTOMS: No  SKELETAL SYMPTOMS: Yes  BLOOD SYMPTOMS: No  NERVOUS SYSTEM SYMPTOMS: Yes  MENTAL HEALTH SYMPTOMS: No  Ear pain: No  Ear discharge: No  Hearing loss: No  Tinnitus: No  Nosebleeds: No  Congestion: Yes  Sinus pain: Yes  Trouble swallowing: No   Voice hoarseness: No  Mouth sores: No  Sore throat: Yes  Tooth pain: No  Gum tenderness: No  Bleeding gums: No  Change in taste: No  Change in sense of smell: No  Dry mouth: No  Hearing aid used: No  Neck lump: No  Cough: Yes  Sputum or phlegm: No  Coughing up blood: No  Difficulty breating or shortness of breath: Yes  Snoring: No  Wheezing: Yes  Difficulty breathing on exertion: Yes  Nighttime Cough: Yes  Difficulty breathing when lying flat: Yes  Back pain: Yes  Muscle aches: No  Neck pain: No  Swollen joints: No  Joint pain: No  Bone pain: No  Muscle cramps: No  Muscle weakness: No  Joint stiffness: No  Bone fracture: No  Trouble with coordination: No  Dizziness or trouble with balance: Yes  Fainting or black-out spells: No  Memory loss: No  Headache: No  Seizures: No  Speech problems: No  Tingling: Yes  Tremor: No  Weakness: No  Difficulty walking: Yes  Paralysis: No  Numbness: No

## 2023-02-23 NOTE — LETTER
"    2/23/2023         RE: Walt Lambert  [address] [place] [id number]        Dear Colleague,    Thank you for referring your patient, Walt Lambert, to the [facility]. Please see a copy of my visit note below.    [facility] ENDOCRINOLOGY    Diabetes Note 2/23/2023    Walt Lambert, 1950, [id number]          Reason for visit      1. Type 2 diabetes mellitus with other circulatory complication, with long-term current use of insulin (H)        HPI     Walt Lambert is a very pleasant 72 year old old male who presents for follow up.  SUMMARY:    Alvino is contacted today via Video visit in f/u for DM 2. His current A1c is 9.7 and reflective of at least a month without all of his medications. His previous He reports that he was without insurance for a month and had to scramble to get insulin because it was going to be over $1000. He has also been without Trulicity because it was too expensive.   He continues to use the Medtronic 670 G insulin pump, but is in the process of transitioning to the T-Slim. He is using the Ivelisse CGM currently, but again, will transition to the Dexcom that pairs with the T-Slim. His Ivelisse download today would support his missing Trulicity, with in target time of only 11%, and basically high the rest. He did have some hypoglycemia on Berger's Day and it quite surprised him because he is \"usually indulging in chocolate on that day\".        Blood glucose data:      Past Medical History     Patient Active Problem List   Diagnosis     Diabetes mellitus (H)     Morbid obesity (H)     Cardiomyopathy (H)     Chronic obstructive pulmonary disease (H)     Hyperlipidemia     Hypertension     Sleep apnea     Insulin pump status     MAHMOOD (dyspnea on exertion)     Abnormal cardiac CT angiography     CAD (coronary artery disease)     S/P CABG (coronary artery bypass graft)     ARF (acute respiratory failure) (H)     Anemia due to blood " loss, acute     Arteriosclerosis of coronary artery bypass graft     Body mass index (BMI) 50.0-59.9, adult     Pure hypertriglyceridemia     Peripheral venous insufficiency     Lymphedema     Chronic kidney disease, stage 1        Family History       family history includes Colon Cancer in his sister; Heart Failure in his mother; Leukemia in his father and mother.    Social History      reports that he has quit smoking. He has never used smokeless tobacco. He reports current alcohol use. He reports that he does not use drugs.      Review of Systems     Patient has no polyuria or polydipsia, no chest pain, dyspnea or TIA's, no numbness, tingling or pain in extremities  Remainder negative except as noted in HPI.      Vital Signs     There were no vitals taken for this visit.  Wt Readings from Last 3 Encounters:   12/20/22 (!) 169.2 kg (373 lb)   08/30/22 (!) 165.6 kg (365 lb)   08/18/22 (!) 170.6 kg (376 lb)       Physical Exam     Constitutional:  Well developed, Well nourished  HENT:  Normocephalic,   Neck: normal in appearance  Eyes:  PERRL, Conjunctiva pink  Respiratory:  No respiratory distress  Skin: No acanthosis nigricans, lipoatrophy or lipodystrophy  Neurologic:  Alert & oriented x 3, nonfocal  Psychiatric:  Affect, Mood, Insight appropriate          Assessment     1. Type 2 diabetes mellitus with other circulatory complication, with long-term current use of insulin (H)        Plan     Will reorder the Trulicity. I would like to see his A1c back down in the low 8s. No changes to his pump settings today. F/u with me in 3 months per Medicare pumping regulations.         Emily Alfred NP  [facility]  2/23/2023  7:55 AM        Lab Results     Microalbumin Urine mg/dL   Date Value Ref Range Status   10/02/2020 17.87 (H) 0.00 - 1.99 mg/dL Final       Cholesterol   Date Value Ref Range Status   05/13/2022 132 <=199 mg/dL Final     Direct Measure HDL   Date Value Ref Range Status   05/13/2022 34 (L) >=40  mg/dL Final     Comment:     HDL Cholesterol Reference Range:     0-2 years:   No reference ranges established for patients under 2 years old  at [facility] for lipid analytes.    2-8 years:  Greater than 45 mg/dL     18 years and older:   Female: Greater than or equal to 50 mg/dL   Male:   Greater than or equal to 40 mg/dL     Triglycerides   Date Value Ref Range Status   05/13/2022 454 (H) <=149 mg/dL Final       [unfilled]      Current Medications     Outpatient Medications Prior to Visit   Medication Sig Dispense Refill     ADVAIR DISKUS 250-50 mcg/dose DISKUS [ADVAIR DISKUS 250-50 MCG/DOSE DISKUS] Inhale 1 puff 2 (two) times a day.       amLODIPine (NORVASC) 10 MG tablet [AMLODIPINE (NORVASC) 10 MG TABLET] 1 tab(s)       aspirin 81 MG EC tablet [ASPIRIN 81 MG EC TABLET] Take 81 mg by mouth daily.       atorvastatin (LIPITOR) 80 MG tablet Take 1 tablet (80 mg) by mouth daily 90 tablet 3     bumetanide (BUMEX) 2 MG tablet TAKE 1 TABLET BY MOUTH EVERY DAY 90 tablet 1     Continuous Blood Gluc Sensor (iWeeboYLE ANA M 14 DAY SENSOR) MISC CHANGE EVERY 14 DAYS AS DIRECTED 6 each 1     Digital Therapy (HELLO HEART BLOOD PRESSURE) KIT        insulin aspart (NOVOLOG VIAL) 100 UNITS/ML vial INJECT 375 UNITS OF INSULIN DAILY VIA THE PUMP  Strength: 100 UNIT/ mL 1     insulin lispro (HUMALOG VIAL) 100 UNIT/ML vial INJECT 375 UNITS OF INSULIN DAILY VIA THE PUMP 340 mL 0     lisinopril (ZESTRIL) 20 MG tablet Take 1 tablet (20 mg) by mouth 2 times daily 180 tablet 3     metoprolol tartrate (LOPRESSOR) 100 MG tablet Take 1 tablet (100 mg) by mouth 2 times daily 180 tablet 3     multivitamin (ONE A DAY) per tablet [MULTIVITAMIN (ONE A DAY) PER TABLET] Take 1 tablet by mouth daily.       omeprazole (PRILOSEC) 20 MG capsule [OMEPRAZOLE (PRILOSEC) 20 MG CAPSULE] Take 20 mg by mouth daily.       simvastatin (ZOCOR) 20 MG tablet Take 20 mg by mouth       triamcinolone (KENALOG) 0.025 % cream [TRIAMCINOLONE (KENALOG)  0.025 % CREAM] Apply 1 application topically 3 (three) times a day as needed.        Dulaglutide (TRULICITY) 3 MG/0.5ML SOPN Inject 3 mg Subcutaneous once a week 6 mL 3     No facility-administered medications prior to visit.               Video-Visit Details    Type of service:  Video Visit    Video Start Time (time video started): 0730    Video End Time (time video stopped): 0750    Originating Location (pt. Location): Home        Distant Location (provider location):  On-site    Mode of Communication:  Video Conference via [employer]          Date of last OV: 8/18/22  Reason for Visit: DM    Blood Glucose Log:              Answers for HPI/ROS submitted by the patient on 2/23/2023  General Symptoms: No  Skin Symptoms: No  HENT Symptoms: Yes  EYE SYMPTOMS: No  HEART SYMPTOMS: No  LUNG SYMPTOMS: Yes  INTESTINAL SYMPTOMS: No  URINARY SYMPTOMS: No  REPRODUCTIVE SYMPTOMS: No  SKELETAL SYMPTOMS: Yes  BLOOD SYMPTOMS: No  NERVOUS SYSTEM SYMPTOMS: Yes  MENTAL HEALTH SYMPTOMS: No  Ear pain: No  Ear discharge: No  Hearing loss: No  Tinnitus: No  Nosebleeds: No  Congestion: Yes  Sinus pain: Yes  Trouble swallowing: No   Voice hoarseness: No  Mouth sores: No  Sore throat: Yes  Tooth pain: No  Gum tenderness: No  Bleeding gums: No  Change in taste: No  Change in sense of smell: No  Dry mouth: No  Hearing aid used: No  Neck lump: No  Cough: Yes  Sputum or phlegm: No  Coughing up blood: No  Difficulty breating or shortness of breath: Yes  Snoring: No  Wheezing: Yes  Difficulty breathing on exertion: Yes  Nighttime Cough: Yes  Difficulty breathing when lying flat: Yes  Back pain: Yes  Muscle aches: No  Neck pain: No  Swollen joints: No  Joint pain: No  Bone pain: No  Muscle cramps: No  Muscle weakness: No  Joint stiffness: No  Bone fracture: No  Trouble with coordination: No  Dizziness or trouble with balance: Yes  Fainting or black-out spells: No  Memory loss: No  Headache: No  Seizures: No  Speech problems: No  Tingling:  Yes  Tremor: No  Weakness: No  Difficulty walking: Yes  Paralysis: No  Numbness: No          Again, thank you for allowing me to participate in the care of your patient.        Sincerely,        Emily Alfred NP

## 2023-03-08 ASSESSMENT — SLEEP AND FATIGUE QUESTIONNAIRES
HOW LIKELY ARE YOU TO NOD OFF OR FALL ASLEEP WHILE SITTING AND READING: SLIGHT CHANCE OF DOZING
HOW LIKELY ARE YOU TO NOD OFF OR FALL ASLEEP WHILE SITTING INACTIVE IN A PUBLIC PLACE: SLIGHT CHANCE OF DOZING
HOW LIKELY ARE YOU TO NOD OFF OR FALL ASLEEP WHILE WATCHING TV: SLIGHT CHANCE OF DOZING
HOW LIKELY ARE YOU TO NOD OFF OR FALL ASLEEP IN A CAR, WHILE STOPPED FOR A FEW MINUTES IN TRAFFIC: WOULD NEVER DOZE
HOW LIKELY ARE YOU TO NOD OFF OR FALL ASLEEP WHEN YOU ARE A PASSENGER IN A CAR FOR AN HOUR WITHOUT A BREAK: SLIGHT CHANCE OF DOZING
HOW LIKELY ARE YOU TO NOD OFF OR FALL ASLEEP WHILE SITTING AND TALKING TO SOMEONE: WOULD NEVER DOZE
HOW LIKELY ARE YOU TO NOD OFF OR FALL ASLEEP WHILE SITTING QUIETLY AFTER LUNCH WITHOUT ALCOHOL: SLIGHT CHANCE OF DOZING
HOW LIKELY ARE YOU TO NOD OFF OR FALL ASLEEP WHILE LYING DOWN TO REST IN THE AFTERNOON WHEN CIRCUMSTANCES PERMIT: HIGH CHANCE OF DOZING

## 2023-03-09 ENCOUNTER — OFFICE VISIT (OUTPATIENT)
Dept: SLEEP MEDICINE | Facility: CLINIC | Age: 73
End: 2023-03-09
Attending: INTERNAL MEDICINE
Payer: COMMERCIAL

## 2023-03-09 VITALS
WEIGHT: 315 LBS | SYSTOLIC BLOOD PRESSURE: 128 MMHG | HEIGHT: 70 IN | HEART RATE: 78 BPM | DIASTOLIC BLOOD PRESSURE: 72 MMHG | BODY MASS INDEX: 45.1 KG/M2 | OXYGEN SATURATION: 95 % | RESPIRATION RATE: 18 BRPM

## 2023-03-09 DIAGNOSIS — G47.10 HYPERSOMNIA: ICD-10-CM

## 2023-03-09 DIAGNOSIS — E11.59 TYPE 2 DIABETES MELLITUS WITH OTHER CIRCULATORY COMPLICATION, WITH LONG-TERM CURRENT USE OF INSULIN (H): Primary | ICD-10-CM

## 2023-03-09 DIAGNOSIS — R06.83 SNORING: ICD-10-CM

## 2023-03-09 DIAGNOSIS — R06.09 DOE (DYSPNEA ON EXERTION): ICD-10-CM

## 2023-03-09 DIAGNOSIS — Z79.4 TYPE 2 DIABETES MELLITUS WITH OTHER CIRCULATORY COMPLICATION, WITH LONG-TERM CURRENT USE OF INSULIN (H): Primary | ICD-10-CM

## 2023-03-09 DIAGNOSIS — Z86.69 HISTORY OF OBSTRUCTIVE SLEEP APNEA: Primary | ICD-10-CM

## 2023-03-09 PROCEDURE — 99204 OFFICE O/P NEW MOD 45 MIN: CPT | Performed by: INTERNAL MEDICINE

## 2023-03-09 RX ORDER — FLASH GLUCOSE SENSOR
KIT MISCELLANEOUS
Qty: 2 EACH | Refills: 5 | Status: SHIPPED | OUTPATIENT
Start: 2023-03-09 | End: 2023-08-28

## 2023-03-09 RX ORDER — BUMETANIDE 2 MG/1
2 TABLET ORAL DAILY
Qty: 90 TABLET | Refills: 1 | Status: SHIPPED | OUTPATIENT
Start: 2023-03-09 | End: 2023-05-18

## 2023-03-09 NOTE — NURSING NOTE
"Chief Complaint   Patient presents with     Sleep Apnea       Initial /72   Pulse 78   Resp 18   Ht 1.778 m (5' 10\")   Wt (!) 168.6 kg (371 lb 9.6 oz)   SpO2 95%   BMI 53.32 kg/m   Estimated body mass index is 53.32 kg/m  as calculated from the following:    Height as of this encounter: 1.778 m (5' 10\").    Weight as of this encounter: 168.6 kg (371 lb 9.6 oz).    Medication Reconciliation: complete    Neck circumference: 57 inches /  centimeters.    "

## 2023-03-09 NOTE — TELEPHONE ENCOUNTER
Refill request for FreeStyle Ivelisse 14 day sensors received via fax from [facility] pharmacy.  Medication pended and pharmacy updated in note.  Routing note to Endocrinology support team.    Last OV: 2/23/23  Future appt: 5/25/23

## 2023-03-09 NOTE — PATIENT INSTRUCTIONS
Equipment Instructions    We will process your PAP order and send it to a Durable Medical Equipment (DME) provider.    The medical equipment company should call you within 7 days.  If you have not heard from the company, please contact them to see if they received your order and are planning to call you.    Please call us at [phone] if you are unable to contact the medical equipment company or if they do not have the order.    If you are starting a new PAP machine, please call us after you use it the first night to let us know how it went. This call also helps us know that you received your equipment and that everything is ready. Please use our central phone number [phone]    Contact information for [employer] company:    [facility] Tel: [phone]

## 2023-03-09 NOTE — PROGRESS NOTES
Additional 15 minutes on the date of service was spent performing the following:    -Preparing to see the patient  -Obtaining and/or reviewing separately obtained history   -Ordering medications, tests, or procedures   -Documenting clinical information in the electronic or other health record     Assessment and Plan:    1. History of obstructive sleep apnea/Hypersomnia/Snoring  I informed the patient that we will make every attempt to try to obtain the original sleep study.  We will have the patient sign a transfer of the DME as well as a release of information today.  I strongly advised the patient to call us back next week to see if we have obtained it.  If we are unable to obtain the sleep study, then will be forced to order another sleep study at that point in time.  The patient expressed understanding and agreed.    History of present illness:    He is a 72 year old male who comes to the clinic transfer of care of his obstructive sleep apnea.  The patient was diagnosed with obstructive sleep apnea many years ago from an outside sleep center.  We do not have a copy of the patient's sleep study during this clinic visit.  The patient has been on CPAP therapy but has not followed up with a sleep medicine physician for more than 2 years.  He is also interested in switching over his durable medical equipment care to [employer] medical equipment company.  Without CPAP therapy he would still suffer from excessive daytime sleepiness and loud snoring.     Sleep-Wake Cycle:    TIME IN BED:    1) Work/School Days:    Do you work or go to school? No   What time do you usually get into bed? 8:00   About how long does it take you to fall asleep? 1 hour   How often do you have trouble falling asleep? 7   How often do you wake up during the night? 2   Do you work days/evenings/nights/rotating shifts? Days   What wakes you up at night? Use the bathroom   How often do you have trouble falling back to sleep? 7    About how long does it take to fall back to sleep? 1/2 hr   What do you usually do if you have trouble getting back to sleep? watch TV   What time do you usually get out of bed to start your day? 5:00   Do you use an alarm? No   2) Weekends/Non-work Days/All Other Days    What time do you usually get into bed? 8:00   About how long does it take you to fall asleep? 1 hr   What time do you usually get out of bed to start your day? 6:00   Do you use an alarm? No   SLEEP NEED    On average, about how much sleep do you think you get? 9   About how much sleep do you think you need? 8   SLEEP POSITION    Which sleep positions do you prefer? Side   Do you do any of the following activities in bed? Watch TV   How often do you take a nap on purpose? 7   About how long are your naps? 1 hr   Do you feel better after naps? Yes   How often do you doze off unintentionally? 1   Have you ever had a driving accident or near-miss due to sleepiness/drowsiness? No       Stop Bang Questionnaire    Question 3/8/2023  2:54 PM CST - Filed by Patient   Do you SNORE loudly (louder than talking or loud enough to be heard through closed doors)? Yes   Do you often feel TIRED, fatigued, or sleepy during daytime? Yes   Has anyone OBSERVED you stop breathing during your sleep? Yes   Do you have or are you being treated for high blood PRESSURE? Yes   BMI more than 35kg/m2? Yes   AGE over 50 years old? Yes   NECK circumference > 16 inches (40cm)? Yes   GENDER: Male? Yes   STOP-BANG Total Score (range: 0 - 8) 7 (High risk of YANE)     Compliance Download data for 30 days:  Pressure setting: APAP 10-20 CWP  95% pressure: 16.5 CWP  Residual AHI: 2.2 events per hour  Leak: Minimal  Compliance: 100%  Mask Tolerance: Good  Skin irritation: None  DME: [employer]    SI:  DWAYNE Total Score: 16  Total score - Oketo: 8 (3/8/2023  2:52 PM)    Patient Active Problem List   Diagnosis     Diabetes mellitus (H)     Morbid obesity (H)     Cardiomyopathy (H)      Chronic obstructive pulmonary disease (H)     Hyperlipidemia     Hypertension     Sleep apnea     Insulin pump status     MAHMOOD (dyspnea on exertion)     Abnormal cardiac CT angiography     CAD (coronary artery disease)     S/P CABG (coronary artery bypass graft)     ARF (acute respiratory failure) (H)     Anemia due to blood loss, acute     Arteriosclerosis of coronary artery bypass graft     Body mass index (BMI) 50.0-59.9, adult     Pure hypertriglyceridemia     Peripheral venous insufficiency     Lymphedema     Chronic kidney disease, stage 1       Past Medical History  Past Medical History:   Diagnosis Date     Asthma      Chronic obstructive pulmonary disease (H) 9/29/2019     Diabetes mellitus (H)     insulin pump     Diabetic neuropathy (H)      GERD (gastroesophageal reflux disease)      Hyperlipidemia      Hypertension      Morbid obesity (H)      Sleep apnea     uses CPAP        Past Surgical History  Past Surgical History:   Procedure Laterality Date     COLONOSCOPY N/A 9/20/2017    Procedure: COLONOSCOPY;  Surgeon: Michael Fagan MD;  Location: [facility];  Service:      CV CORONARY ANGIOGRAM N/A 2/19/2020    Procedure: Coronary Angiogram;  Surgeon: Daniel Hayden MD;  Location: [facility];  Service: Cardiology     CV LEFT HEART CATHETERIZATION WITH LEFT VENTRICULOGRAM N/A 2/19/2020    Procedure: Left Heart Catheterization with Left Ventriculogram;  Surgeon: Daniel Hayden MD;  Location: [facility];  Service: Cardiology     GALLBLADDER SURGERY          Meds  Current Outpatient Medications   Medication Sig Dispense Refill     ADVAIR DISKUS 250-50 mcg/dose DISKUS [ADVAIR DISKUS 250-50 MCG/DOSE DISKUS] Inhale 1 puff 2 (two) times a day.       amLODIPine (NORVASC) 10 MG tablet [AMLODIPINE (NORVASC) 10 MG TABLET] 1 tab(s)       aspirin 81 MG EC tablet [ASPIRIN 81 MG EC TABLET] Take 81 mg by mouth daily.       atorvastatin (LIPITOR) 80 MG tablet Take 1 tablet (80 mg) by mouth  daily 90 tablet 3     bumetanide (BUMEX) 2 MG tablet Take 1 tablet (2 mg) by mouth daily 90 tablet 1     Continuous Blood Gluc Sensor (FREESTYLE ANA M 14 DAY SENSOR) MISC CHANGE EVERY 14 DAYS AS DIRECTED 6 each 1     Digital Therapy (HELLO HEART BLOOD PRESSURE) KIT        Dulaglutide (TRULICITY) 3 MG/0.5ML SOPN Inject 3 mg Subcutaneous once a week 6 mL 3     insulin lispro (HUMALOG VIAL) 100 UNIT/ML vial Use 400 units daily via insulin pump 90529 mL 3     lisinopril (ZESTRIL) 20 MG tablet Take 1 tablet (20 mg) by mouth 2 times daily 180 tablet 3     metoprolol tartrate (LOPRESSOR) 100 MG tablet Take 1 tablet (100 mg) by mouth 2 times daily 180 tablet 3     multivitamin (ONE A DAY) per tablet [MULTIVITAMIN (ONE A DAY) PER TABLET] Take 1 tablet by mouth daily.       omeprazole (PRILOSEC) 20 MG capsule [OMEPRAZOLE (PRILOSEC) 20 MG CAPSULE] Take 20 mg by mouth daily.       simvastatin (ZOCOR) 20 MG tablet Take 20 mg by mouth       triamcinolone (KENALOG) 0.025 % cream [TRIAMCINOLONE (KENALOG) 0.025 % CREAM] Apply 1 application topically 3 (three) times a day as needed.           Allergies  Patient has no known allergies.     Social History  Social History     Socioeconomic History     Marital status:      Spouse name: Not on file     Number of children: Not on file     Years of education: Not on file     Highest education level: Not on file   Occupational History     Not on file   Tobacco Use     Smoking status: Former     Smokeless tobacco: Never     Tobacco comments:     quit 40 years ago   Vaping Use     Vaping Use: Never used   Substance and Sexual Activity     Alcohol use: Yes     Comment: Alcoholic Drinks/day: one beer every two weeks      Drug use: No     Sexual activity: Not on file   Other Topics Concern     Not on file   Social History Narrative     Not on file     Social Determinants of Health     Financial Resource Strain: Not on file   Food Insecurity: Not on file   Transportation Needs: Not on file  "  Physical Activity: Not on file   Stress: Not on file   Social Connections: Not on file   Intimate Partner Violence: Not on file   Housing Stability: Not on file        Family History  Family History   Problem Relation Age of Onset     Leukemia Mother      Heart Failure Mother      Snoring Mother      Leukemia Father      Snoring Father      Colon Cancer Sister       Review of Systems:  Constitutional: Negative except as noted in HPI.   Eyes: Negative except as noted in HPI.   ENT: Negative except as noted in HPI.   Cardiovascular: Negative except as noted in HPI.   Respiratory: Negative except as noted in HPI.   Gastrointestinal: Negative except as noted in HPI.   Genitourinary: Negative except as noted in HPI.   Musculoskeletal: Negative except as noted in HPI.   Integumentary: Negative except as noted in HPI.   Neurological: Negative except as noted in HPI.   Psychiatric: Negative except as noted in HPI.   Endocrine: Negative except as noted in HPI.   Hematologic/Lymphatic: Negative except as noted in HPI.      Physical Exam:  /72   Pulse 78   Resp 18   Ht 1.778 m (5' 10\")   Wt (!) 168.6 kg (371 lb 9.6 oz)   SpO2 95%   BMI 53.32 kg/m    BMI:Body mass index is 53.32 kg/m .   GEN: NAD, morbidly obese  Head: Normocephalic.  EYES: PERRLA, EOMI  ENT: Oropharynx is clear, Martinez class 4+ airway.   Nasal mucosa is moist without erythema  Neck : Thyroid is within normal limits.   CV: Regular rate and rhythm, S1 & S2 positive.  LUNGS: Bilateral breathsounds heard.   ABDOMEN: Positive bowel sounds in all quadrants, soft, no rebound or guarding  MUSCULOSKELETAL: Bilateral 3+ leg swelling  SKIN: warm, dry, no rashes  Neurological: Alert, oriented to time, place, and person. Gait is normal. Strength 5/5 in all extremities.  Psych: normal mood, normal affect     Labs/Studies:     Lab Results   Component Value Date    PH 7.45 02/29/2020    PH 7.41 02/29/2020    PO2 121 (H) 02/28/2020    PO2 76 02/28/2020    PCO2 " 41 02/28/2020    PCO2 40 02/28/2020    HCO3 23.3 02/26/2020    HCO3 26.6 02/26/2020    LIDIA 2.6 02/28/2020    LIDIA 1.3 02/28/2020     No results found for: TSH  Lab Results   Component Value Date     (H) 02/13/2023     (H) 10/13/2022     Lab Results   Component Value Date    HGB 14.7 08/08/2022    HGB 13.0 (L) 03/03/2020     Lab Results   Component Value Date    BUN 23.9 (H) 02/13/2023    BUN 18 08/08/2022    CR 1.19 (H) 02/13/2023    CR 1.22 08/08/2022     Lab Results   Component Value Date    AST 69 (H) 05/13/2022    AST 43 (H) 02/19/2021    ALT 78 (H) 05/13/2022    ALT 68 (H) 02/19/2021    ALKPHOS 113 05/13/2022    ALKPHOS 134 (H) 02/19/2021    BILITOTAL 1.4 (H) 05/13/2022    BILITOTAL 1.1 (H) 02/19/2021     No results found for: UAMP, UBARB, BENZODIAZEUR, UCANN, UCOC, OPIT, UPCP      Patient verbalized understanding of these issues, agrees with the plan and all questions were answered today. Patient was given an opportuntity to voice any other symptoms or concerns not listed above. Patient did not have any other symptoms or concerns.         Gustavo Borjas DO,   Board Certified in Internal Medicine and Sleep Medicine    (Note created with Dragon voice recognition and unintended spelling errors and word substitutions may occur)

## 2023-03-14 ENCOUNTER — TELEPHONE (OUTPATIENT)
Dept: SLEEP MEDICINE | Facility: CLINIC | Age: 73
End: 2023-03-14
Payer: COMMERCIAL

## 2023-03-14 DIAGNOSIS — G47.10 HYPERSOMNIA: ICD-10-CM

## 2023-03-14 DIAGNOSIS — R06.83 SNORING: ICD-10-CM

## 2023-03-14 DIAGNOSIS — Z86.69 HISTORY OF OBSTRUCTIVE SLEEP APNEA: Primary | ICD-10-CM

## 2023-03-14 NOTE — TELEPHONE ENCOUNTER
Please call the patient to inform her that we were unable to find the original sleep study.  I will therefore order an in lab diagnostic study.  Advised the patient to hold off on using CPAP for at least 2 weeks before the sleep study.  Thank you.

## 2023-03-16 ENCOUNTER — TELEPHONE (OUTPATIENT)
Dept: ENDOCRINOLOGY | Facility: CLINIC | Age: 73
End: 2023-03-16
Payer: COMMERCIAL

## 2023-03-16 NOTE — TELEPHONE ENCOUNTER
Faxed back the following clarification request on Humalog to [facility] - fax# [phone]. Ph# [phone].

## 2023-05-04 DIAGNOSIS — Z79.4 TYPE 2 DIABETES MELLITUS WITH OTHER CIRCULATORY COMPLICATION, WITH LONG-TERM CURRENT USE OF INSULIN (H): ICD-10-CM

## 2023-05-04 DIAGNOSIS — E11.59 TYPE 2 DIABETES MELLITUS WITH OTHER CIRCULATORY COMPLICATION, WITH LONG-TERM CURRENT USE OF INSULIN (H): ICD-10-CM

## 2023-05-04 RX ORDER — INSULIN LISPRO 100 [IU]/ML
INJECTION, SOLUTION INTRAVENOUS; SUBCUTANEOUS
Qty: 360 ML | Refills: 1 | Status: SHIPPED | OUTPATIENT
Start: 2023-05-04 | End: 2023-11-13

## 2023-05-15 ENCOUNTER — LAB (OUTPATIENT)
Dept: LAB | Facility: CLINIC | Age: 73
End: 2023-05-15
Payer: COMMERCIAL

## 2023-05-15 DIAGNOSIS — E11.59 TYPE 2 DIABETES MELLITUS WITH OTHER CIRCULATORY COMPLICATION, WITH LONG-TERM CURRENT USE OF INSULIN (H): ICD-10-CM

## 2023-05-15 DIAGNOSIS — Z79.4 TYPE 2 DIABETES MELLITUS WITH OTHER CIRCULATORY COMPLICATION, WITH LONG-TERM CURRENT USE OF INSULIN (H): ICD-10-CM

## 2023-05-15 LAB — HBA1C MFR BLD: 6.8 % (ref 0–5.6)

## 2023-05-15 PROCEDURE — 36415 COLL VENOUS BLD VENIPUNCTURE: CPT

## 2023-05-15 PROCEDURE — 83036 HEMOGLOBIN GLYCOSYLATED A1C: CPT

## 2023-05-18 DIAGNOSIS — R06.09 DOE (DYSPNEA ON EXERTION): ICD-10-CM

## 2023-05-18 RX ORDER — BUMETANIDE 2 MG/1
2 TABLET ORAL DAILY
Qty: 100 TABLET | Refills: 0 | Status: SHIPPED | OUTPATIENT
Start: 2023-05-18 | End: 2023-08-28

## 2023-05-25 ENCOUNTER — OFFICE VISIT (OUTPATIENT)
Dept: ENDOCRINOLOGY | Facility: CLINIC | Age: 73
End: 2023-05-25
Payer: COMMERCIAL

## 2023-05-25 VITALS
WEIGHT: 315 LBS | BODY MASS INDEX: 54.02 KG/M2 | HEART RATE: 77 BPM | DIASTOLIC BLOOD PRESSURE: 84 MMHG | OXYGEN SATURATION: 96 % | SYSTOLIC BLOOD PRESSURE: 138 MMHG

## 2023-05-25 DIAGNOSIS — Z79.4 TYPE 2 DIABETES MELLITUS WITH OTHER CIRCULATORY COMPLICATION, WITH LONG-TERM CURRENT USE OF INSULIN (H): Primary | ICD-10-CM

## 2023-05-25 DIAGNOSIS — E11.59 TYPE 2 DIABETES MELLITUS WITH OTHER CIRCULATORY COMPLICATION, WITH LONG-TERM CURRENT USE OF INSULIN (H): Primary | ICD-10-CM

## 2023-05-25 DIAGNOSIS — E66.01 MORBID OBESITY (H): ICD-10-CM

## 2023-05-25 PROCEDURE — 99214 OFFICE O/P EST MOD 30 MIN: CPT | Performed by: NURSE PRACTITIONER

## 2023-05-25 ASSESSMENT — ENCOUNTER SYMPTOMS
TREMORS: 0
TROUBLE SWALLOWING: 0
WHEEZING: 1
SPUTUM PRODUCTION: 0
JOINT SWELLING: 0
SMELL DISTURBANCE: 0
SINUS PAIN: 1
NECK MASS: 0
DISTURBANCES IN COORDINATION: 0
COUGH DISTURBING SLEEP: 0
TASTE DISTURBANCE: 0
SKIN CHANGES: 1
MYALGIAS: 0
SEIZURES: 0
MEMORY LOSS: 0
COUGH: 0
HOARSE VOICE: 0
NAIL CHANGES: 0
STIFFNESS: 0
SINUS CONGESTION: 1
SHORTNESS OF BREATH: 0
DIZZINESS: 0
MUSCLE CRAMPS: 0
TINGLING: 0
POSTURAL DYSPNEA: 0
SPEECH CHANGE: 0
HEMOPTYSIS: 0
PARALYSIS: 0
LOSS OF CONSCIOUSNESS: 0
DYSPNEA ON EXERTION: 1
HEADACHES: 0
NECK PAIN: 0
MUSCLE WEAKNESS: 0
WEAKNESS: 0
SORE THROAT: 0
POOR WOUND HEALING: 0
BACK PAIN: 1
ARTHRALGIAS: 0
SNORES LOUDLY: 0

## 2023-05-25 NOTE — PROGRESS NOTES
[facility] ENDOCRINOLOGY    Diabetes Note 5/26/2023    Walt Lambert, 1950, [id number]          Reason for visit      1. Type 2 diabetes mellitus with other circulatory complication, with long-term current use of insulin (H)    2. Morbid obesity (H)        HPI     Walt Lambert is a very pleasant 72 year old old male who presents for follow up.  SUMMARY:    Alvino is seen in f/u for DM 2. His current A1c is 6.8 and down considerably from his previous of 9.7. He has been using the T-Slim insulin pump, and for the most part, he likes it. He is also using the Ivelisse CGM, which was downloaded an data was reviewed. GMI of 7.1 and TIR was 71%, which is right at goal. He reports that he has been watching his meals, and eating less. Stress levels have been a little better as well, and this too has been contributory.     In addition to the insulin, he is taking Trulicity, 3 mg weekly, and has not been without it for a while.     He had to turn his basal insulin settings down a bit for a while when he became more active, but that as slowed, so he turned them back to where they had been. He notes that his lymphedema has worsened because of his sedentary life.     Renal function remains stable. GFR is within normal limits. He remains on an ACE and a Statin.     Alvino continues to struggle with his weight. He is not yet at a place in which he would like help, however.     Blood glucose data:      Past Medical History     Patient Active Problem List   Diagnosis     Diabetes mellitus (H)     Morbid obesity (H)     Cardiomyopathy (H)     Chronic obstructive pulmonary disease (H)     Hyperlipidemia     Hypertension     Sleep apnea     Insulin pump status     MAHMOOD (dyspnea on exertion)     Abnormal cardiac CT angiography     CAD (coronary artery disease)     S/P CABG (coronary artery bypass graft)     ARF (acute respiratory failure) (H)     Anemia due to blood loss, acute     Arteriosclerosis of coronary artery bypass  graft     Body mass index (BMI) 50.0-59.9, adult     Pure hypertriglyceridemia     Peripheral venous insufficiency     Lymphedema     Chronic kidney disease, stage 1        Family History       family history includes Colon Cancer in his sister; Heart Failure in his mother; Leukemia in his father and mother; Snoring in his father and mother.    Social History      reports that he has quit smoking. He has never used smokeless tobacco. He reports current alcohol use. He reports that he does not use drugs.      Review of Systems     Patient has no polyuria or polydipsia, no chest pain, dyspnea or TIA's, no numbness, tingling or pain in extremities  Remainder negative except as noted in HPI.    Answers for HPI/ROS submitted by the patient on 5/25/2023  General Symptoms: No  Skin Symptoms: Yes  HENT Symptoms: Yes  EYE SYMPTOMS: No  HEART SYMPTOMS: No  LUNG SYMPTOMS: Yes  INTESTINAL SYMPTOMS: No  URINARY SYMPTOMS: No  REPRODUCTIVE SYMPTOMS: No  SKELETAL SYMPTOMS: Yes  BLOOD SYMPTOMS: No  NERVOUS SYSTEM SYMPTOMS: Yes  MENTAL HEALTH SYMPTOMS: No  Ear pain: No  Ear discharge: No  Hearing loss: No  Tinnitus: No  Nosebleeds: No  Congestion: Yes  Sinus pain: Yes  Trouble swallowing: No   Voice hoarseness: No  Mouth sores: No  Sore throat: No  Tooth pain: No  Gum tenderness: No  Bleeding gums: No  Change in taste: No  Change in sense of smell: No  Dry mouth: No  Hearing aid used: No  Neck lump: No  Changes in hair: No  Changes in moles/birth marks: Yes  Itching: No  Rashes: No  Changes in nails: No  Acne: No  Change in facial hair: No  Warts: No  Non-healing sores: No  Scarring: No  Flaking of skin: Yes  Color changes of hands/feet in cold : No  Sun sensitivity: No  Skin thickening: No  Cough: No  Sputum or phlegm: No  Coughing up blood: No  Difficulty breating or shortness of breath: No  Snoring: No  Wheezing: Yes  Difficulty breathing on exertion: Yes  Nighttime Cough: No  Difficulty breathing when lying flat: No  Back pain:  Yes  Muscle aches: No  Neck pain: No  Swollen joints: No  Joint pain: No  Bone pain: No  Muscle cramps: No  Muscle weakness: No  Joint stiffness: No  Bone fracture: No  Trouble with coordination: No  Dizziness or trouble with balance: No  Fainting or black-out spells: No  Memory loss: No  Headache: No  Seizures: No  Speech problems: No  Tingling: No  Tremor: No  Weakness: No  Difficulty walking: No  Paralysis: No          Vital Signs     /84 (BP Location: Right arm, Patient Position: Sitting, Cuff Size: Adult Large)   Pulse 77   Wt (!) 170.8 kg (376 lb 8 oz)   SpO2 96%   BMI 54.02 kg/m    Wt Readings from Last 3 Encounters:   05/25/23 (!) 170.8 kg (376 lb 8 oz)   03/09/23 (!) 168.6 kg (371 lb 9.6 oz)   12/20/22 (!) 169.2 kg (373 lb)       Physical Exam     Constitutional:  Well developed, Well nourished  HENT:  Normocephalic,   Neck: Thyroid normal, No lymph nodes, Supple  Eyes:  PERRL, Conjunctiva pink  Respiratory:  Normal breath sounds, No respiratory distress  Cardiovascular:  Normal heart rate, Normal rhythm, No murmurs  GI:  Bowel sounds normal, Soft, No tenderness  Musculoskeletal:  No gross deformity or lesions, normal dorsalis pedis pulses  Skin: No acanthosis nigricans, lipoatrophy or lipodystrophy  Neurologic:  Alert & oriented x 3, nonfocal  Psychiatric:  Affect, Mood, Insight appropriate  Diabetic foot exam: no ulcers, charcot's or high risk calluses,        Assessment     1. Type 2 diabetes mellitus with other circulatory complication, with long-term current use of insulin (H)    2. Morbid obesity (H)        Plan     Alvino's BG control has improved. No changes to his medication regimen today. He will f/u with me in 6 months.         Emily Alfred NP  [facility]  5/26/2023  6:28 AM      Lab Results     Microalbumin Urine mg/dL   Date Value Ref Range Status   10/02/2020 17.87 (H) 0.00 - 1.99 mg/dL Final       Cholesterol   Date Value Ref Range Status   05/13/2022 132 <=199 mg/dL Final      Direct Measure HDL   Date Value Ref Range Status   05/13/2022 34 (L) >=40 mg/dL Final     Comment:     HDL Cholesterol Reference Range:     0-2 years:   No reference ranges established for patients under 2 years old  at [facility] for lipid analytes.    2-8 years:  Greater than 45 mg/dL     18 years and older:   Female: Greater than or equal to 50 mg/dL   Male:   Greater than or equal to 40 mg/dL     Triglycerides   Date Value Ref Range Status   05/13/2022 454 (H) <=149 mg/dL Final             Current Medications     Outpatient Medications Prior to Visit   Medication Sig Dispense Refill     ADVAIR DISKUS 250-50 mcg/dose DISKUS [ADVAIR DISKUS 250-50 MCG/DOSE DISKUS] Inhale 1 puff 2 (two) times a day.       amLODIPine (NORVASC) 10 MG tablet [AMLODIPINE (NORVASC) 10 MG TABLET] 1 tab(s)       aspirin 81 MG EC tablet [ASPIRIN 81 MG EC TABLET] Take 81 mg by mouth daily.       atorvastatin (LIPITOR) 80 MG tablet Take 1 tablet (80 mg) by mouth daily 90 tablet 3     bumetanide (BUMEX) 2 MG tablet Take 1 tablet (2 mg) by mouth daily -Needs to schedule cardiology follow-up appt for further refills 100 tablet 0     Digital Therapy ([facility]) KIT        lisinopril (ZESTRIL) 20 MG tablet Take 1 tablet (20 mg) by mouth 2 times daily 180 tablet 3     metoprolol tartrate (LOPRESSOR) 100 MG tablet Take 1 tablet (100 mg) by mouth 2 times daily 180 tablet 3     multivitamin (ONE A DAY) per tablet [MULTIVITAMIN (ONE A DAY) PER TABLET] Take 1 tablet by mouth daily.       omeprazole (PRILOSEC) 20 MG capsule [OMEPRAZOLE (PRILOSEC) 20 MG CAPSULE] Take 20 mg by mouth daily.       simvastatin (ZOCOR) 20 MG tablet Take 20 mg by mouth       triamcinolone (KENALOG) 0.025 % cream [TRIAMCINOLONE (KENALOG) 0.025 % CREAM] Apply 1 application topically 3 (three) times a day as needed.        Continuous Blood Gluc Sensor (SolvateSTYLE ANA M 14 DAY SENSOR) MISC Change every 14 days. 2 each 5     Continuous Blood Gluc  Sensor (FREESTYLE ANA M 14 DAY SENSOR) MISC CHANGE EVERY 14 DAYS AS DIRECTED 6 each 1     Dulaglutide (TRULICITY) 3 MG/0.5ML SOPN Inject 3 mg Subcutaneous once a week 6 mL 3     insulin lispro (HUMALOG VIAL) 100 UNIT/ML vial Use 400 units daily via insulin pump 360 mL 1     No facility-administered medications prior to visit.

## 2023-05-25 NOTE — Clinical Note
5/25/2023         RE: Walt Lambert  [address] [place] [id number]        Dear Colleague,    Thank you for referring your patient, Walt Lambert, to the [facility]. Please see a copy of my visit note below.                    Again, thank you for allowing me to participate in the care of your patient.        Sincerely,        Emily Alfred NP

## 2023-06-19 ASSESSMENT — SLEEP AND FATIGUE QUESTIONNAIRES
HOW LIKELY ARE YOU TO NOD OFF OR FALL ASLEEP WHILE SITTING INACTIVE IN A PUBLIC PLACE: SLIGHT CHANCE OF DOZING
HOW LIKELY ARE YOU TO NOD OFF OR FALL ASLEEP WHILE SITTING AND READING: SLIGHT CHANCE OF DOZING
HOW LIKELY ARE YOU TO NOD OFF OR FALL ASLEEP WHILE WATCHING TV: SLIGHT CHANCE OF DOZING
HOW LIKELY ARE YOU TO NOD OFF OR FALL ASLEEP WHILE LYING DOWN TO REST IN THE AFTERNOON WHEN CIRCUMSTANCES PERMIT: HIGH CHANCE OF DOZING
HOW LIKELY ARE YOU TO NOD OFF OR FALL ASLEEP WHILE SITTING AND TALKING TO SOMEONE: WOULD NEVER DOZE
HOW LIKELY ARE YOU TO NOD OFF OR FALL ASLEEP IN A CAR, WHILE STOPPED FOR A FEW MINUTES IN TRAFFIC: WOULD NEVER DOZE
HOW LIKELY ARE YOU TO NOD OFF OR FALL ASLEEP WHILE SITTING QUIETLY AFTER LUNCH WITHOUT ALCOHOL: SLIGHT CHANCE OF DOZING
HOW LIKELY ARE YOU TO NOD OFF OR FALL ASLEEP WHEN YOU ARE A PASSENGER IN A CAR FOR AN HOUR WITHOUT A BREAK: MODERATE CHANCE OF DOZING

## 2023-06-23 ENCOUNTER — THERAPY VISIT (OUTPATIENT)
Dept: SLEEP MEDICINE | Facility: CLINIC | Age: 73
End: 2023-06-23
Attending: INTERNAL MEDICINE
Payer: COMMERCIAL

## 2023-06-23 DIAGNOSIS — R06.83 SNORING: ICD-10-CM

## 2023-06-23 DIAGNOSIS — Z86.69 HISTORY OF OBSTRUCTIVE SLEEP APNEA: ICD-10-CM

## 2023-06-23 DIAGNOSIS — G47.10 HYPERSOMNIA: ICD-10-CM

## 2023-06-23 PROCEDURE — 95811 POLYSOM 6/>YRS CPAP 4/> PARM: CPT | Performed by: INTERNAL MEDICINE

## 2023-06-24 DIAGNOSIS — E11.59 TYPE 2 DIABETES MELLITUS WITH OTHER CIRCULATORY COMPLICATION, WITH LONG-TERM CURRENT USE OF INSULIN (H): ICD-10-CM

## 2023-06-24 DIAGNOSIS — Z79.4 TYPE 2 DIABETES MELLITUS WITH OTHER CIRCULATORY COMPLICATION, WITH LONG-TERM CURRENT USE OF INSULIN (H): ICD-10-CM

## 2023-06-24 NOTE — PROGRESS NOTES
Completed a split night PSG per provider order.    Preliminary AHI 84.2.  A final therapeutic PAP pressure was achieved.    Supine REM was seen on therapeutic pressure.    Patient reports feeling not refreshed in AM.

## 2023-06-26 ENCOUNTER — OFFICE VISIT (OUTPATIENT)
Dept: PULMONOLOGY | Facility: CLINIC | Age: 73
End: 2023-06-26
Payer: COMMERCIAL

## 2023-06-26 VITALS
DIASTOLIC BLOOD PRESSURE: 79 MMHG | HEIGHT: 70 IN | SYSTOLIC BLOOD PRESSURE: 136 MMHG | WEIGHT: 315 LBS | OXYGEN SATURATION: 94 % | BODY MASS INDEX: 45.1 KG/M2 | HEART RATE: 80 BPM

## 2023-06-26 DIAGNOSIS — R06.09 DYSPNEA ON EXERTION: Primary | ICD-10-CM

## 2023-06-26 PROCEDURE — 99214 OFFICE O/P EST MOD 30 MIN: CPT | Performed by: INTERNAL MEDICINE

## 2023-06-26 NOTE — TELEPHONE ENCOUNTER
Requested Prescriptions   Pending Prescriptions Disp Refills     Continuous Blood Gluc Sensor (FREESTYLE ANA M 2 SENSOR) MISC [Pharmacy Med Name: FREESTYLE LIBRE_2 SENSOR]  5     Sig: CHANGE EVERY 14 DAYS       There is no refill protocol information for this order

## 2023-06-26 NOTE — PROGRESS NOTES
"Assessment and Plan:Walt Lambert is a 72 year old male with a past medical history significant for Afib, CAD, YANE, presumed asthma who presents to clinic today for follow up.  He did not desaturate previously on 6 minute walk. He just completed a sleep study but his CPAP settings have not been changed as his appointment is coming up soon. His last sleep study was very long time ago.     Dyspnea -multifactorial likely secondary to morbid obesity and deconditioning.  Patient's BMI is more than 50. He has reversibility on PFT's but no triggers for his \"presumed\" asthma. Suspect obesity and deconditioning playing a larger role for his dyspnea. Encouraged to have formal exercise program and weight loss.     Asthma - on Advair. Seems stable.     YANE - follows up with sleep. Had a sleep study and to see sleep soon.    RTC in 3-4 months.    CCx: dyspnea    HPI: Mr. Lambert, ex-smoker (smoked for about 20 years 1-2 packs a day, quit 30 years ago) is here for follow up of dyspnea. He walks about 1 block before having to rest. He is also limited by low back pain. SOB is worse when bending over. He does not exercise.  He says he tried to check his O2 and it dropped to 80's.  He is on Advair 250/50 and a nebulizer. He uses his nebulizer once a week. He denies any cough but has occasional wheeze. He has YANE and is on CPAP.     PMHx:  CAD s/p CABG 3 years ago  YANE on CPAP  HTN/HPL  ? Atrial fibrillation  Lymphedema  Diabetes Mellitus     Social history  Ex smoker  Retired. Used to be  for 3 M.     ROS:  10 point review of system negative except for what is mentioned in the HPI.    Labs/tests/Imaging results:    I have personally reviewed all pertinent imaging studies and PFT results unless otherwise noted.    PFT's on 8/2022:  Although the FEV1/FVC ratio is > LLN, the appearance of the flow volume curve and the reduction in FEV1 with bronchodilator response indicates at least mild obstruction with reversibility.   Air " trapping is present.   Diffusing capacity for CO is normal.     Echo 6/2022:  1. The LV is normal in cavity size and wall thickness. The global systolic function is normal. The LVEF is 71%. There are no regional wall motion abnormalities.  2. The RV is normal in cavity size. The global systolic function is normal. The RVEF is 71%.   3. Mild left atrial enlargement, right atrium is normal in size.  4. There is no significant valvular disease.   5. Late gadolinium enhancement imaging shows a focus of mesocardial LGE in the basal anterior segment which  is a nonischemic pattern. Otherwise, no MI, fibrosis or infiltrative disease.   6. There is no pericardial effusion or thickening. No evidence of ventricular interdependence. No  pericardial adhesion on grid tagging.  7. There is no intracardiac thrombus.  8. T2 weighted imaging does not show convincing evidence of edema or inflammation, note  T2wi are slightly compromised by aliasing artifact      CONCLUSIONS:  Normal biventricular function with LVEF and RVEF of 71%. Focal mesocardial LGE in basal  anterior wall in a nonischemic pattern. Otherwise, no MI, fibrosis or infiltrative disease. No evidence of  pericarditis. No definite evidence of edema or acute inflammation on T2 weighted images.     CXR on 3/2020:     IMPRESSION:  Large lung volumes; correlate for obstructive lung disease. Minimal basilar atelectasis / scarring. No focal consolidation. No pulmonary edema. No definitive pleural effusion. No pneumothorax. Stable mildly enlarged cardiac silhouette. Prior   sternotomy and CABG.      Current Meds:  Current Outpatient Medications   Medication Sig Dispense Refill     ADVAIR DISKUS 250-50 mcg/dose DISKUS [ADVAIR DISKUS 250-50 MCG/DOSE DISKUS] Inhale 1 puff 2 (two) times a day.       amLODIPine (NORVASC) 10 MG tablet [AMLODIPINE (NORVASC) 10 MG TABLET] 1 tab(s)       aspirin 81 MG EC tablet [ASPIRIN 81 MG EC TABLET] Take 81 mg by mouth daily.       atorvastatin  (LIPITOR) 80 MG tablet Take 1 tablet (80 mg) by mouth daily 90 tablet 3     bumetanide (BUMEX) 2 MG tablet Take 1 tablet (2 mg) by mouth daily -Needs to schedule cardiology follow-up appt for further refills 100 tablet 0     Continuous Blood Gluc Sensor (FREESTYLE ANA M 14 DAY SENSOR) MISC Change every 14 days. 2 each 5     Continuous Blood Gluc Sensor (FREESTYLE ANA M 14 DAY SENSOR) MISC CHANGE EVERY 14 DAYS AS DIRECTED 6 each 1     Digital Therapy (HELLO HEART BLOOD PRESSURE) KIT        Dulaglutide (TRULICITY) 3 MG/0.5ML SOPN Inject 3 mg Subcutaneous once a week 6 mL 3     insulin lispro (HUMALOG VIAL) 100 UNIT/ML vial Use 400 units daily via insulin pump 360 mL 1     lisinopril (ZESTRIL) 20 MG tablet Take 1 tablet (20 mg) by mouth 2 times daily 180 tablet 3     metoprolol tartrate (LOPRESSOR) 100 MG tablet Take 1 tablet (100 mg) by mouth 2 times daily 180 tablet 3     multivitamin (ONE A DAY) per tablet [MULTIVITAMIN (ONE A DAY) PER TABLET] Take 1 tablet by mouth daily.       omeprazole (PRILOSEC) 20 MG capsule [OMEPRAZOLE (PRILOSEC) 20 MG CAPSULE] Take 20 mg by mouth daily.       simvastatin (ZOCOR) 20 MG tablet Take 20 mg by mouth       triamcinolone (KENALOG) 0.025 % cream [TRIAMCINOLONE (KENALOG) 0.025 % CREAM] Apply 1 application topically 3 (three) times a day as needed.          Labs:  @clab@  Lab Results   Component Value Date    WBC 10.7 03/03/2020    HGB 14.7 08/08/2022    HCT 41.6 03/03/2020     03/03/2020     02/13/2023    POTASSIUM 4.5 02/13/2023    CHLORIDE 102 02/13/2023    CO2 27 02/13/2023     (H) 02/13/2023    BUN 23.9 (H) 02/13/2023    CR 1.19 (H) 02/13/2023    MAG 2.0 03/02/2020    INR 1.42 (H) 02/27/2020    BILITOTAL 1.4 (H) 05/13/2022    AST 69 (H) 05/13/2022    ALT 78 (H) 05/13/2022    ALKPHOS 113 05/13/2022    PROTTOTAL 6.4 05/13/2022    ALBUMIN 3.4 (L) 05/13/2022       Physical Exam:  /79 (BP Location: Left arm, Patient Position: Sitting, Cuff Size: Adult Large)   " Pulse 80   Ht 1.778 m (5' 10\")   Wt (!) 172 kg (379 lb 3.2 oz)   SpO2 94%   BMI 54.41 kg/m    General - Well nourished  HEENT -  NCAT  Neck - no cervical lymphadenopathy  Lungs - Clear to ausculation bilaterally   CVS - regular rhythm with no murmurs, rubs or gallups  Abdomen - soft, NT, ND, NABS obese  Ext - no cyanosis, clubbing or edema  Skin - no rash  Psychology - alert and oriented, answers appropriate        Valentino Dempsey MD    "

## 2023-06-26 NOTE — NURSING NOTE
Patient oxygen saturation on RA at rest is 95.  Oxygen saturation after ambulating 300ft on RA is 90.      Patient is ambulatory within his/her home.

## 2023-07-11 ENCOUNTER — OFFICE VISIT (OUTPATIENT)
Dept: SLEEP MEDICINE | Facility: CLINIC | Age: 73
End: 2023-07-11
Payer: COMMERCIAL

## 2023-07-11 VITALS
SYSTOLIC BLOOD PRESSURE: 116 MMHG | HEART RATE: 73 BPM | HEIGHT: 70 IN | WEIGHT: 315 LBS | DIASTOLIC BLOOD PRESSURE: 74 MMHG | BODY MASS INDEX: 45.1 KG/M2 | OXYGEN SATURATION: 96 %

## 2023-07-11 DIAGNOSIS — G47.61 PERIODIC LIMB MOVEMENT: ICD-10-CM

## 2023-07-11 DIAGNOSIS — G47.33 OBSTRUCTIVE SLEEP APNEA: Primary | ICD-10-CM

## 2023-07-11 DIAGNOSIS — G47.10 HYPERSOMNIA: ICD-10-CM

## 2023-07-11 LAB — SLPCOMP: NORMAL

## 2023-07-11 PROCEDURE — 99214 OFFICE O/P EST MOD 30 MIN: CPT | Performed by: INTERNAL MEDICINE

## 2023-07-11 ASSESSMENT — SLEEP AND FATIGUE QUESTIONNAIRES
HOW LIKELY ARE YOU TO NOD OFF OR FALL ASLEEP WHILE WATCHING TV: SLIGHT CHANCE OF DOZING
HOW LIKELY ARE YOU TO NOD OFF OR FALL ASLEEP WHILE LYING DOWN TO REST IN THE AFTERNOON WHEN CIRCUMSTANCES PERMIT: SLIGHT CHANCE OF DOZING
HOW LIKELY ARE YOU TO NOD OFF OR FALL ASLEEP WHEN YOU ARE A PASSENGER IN A CAR FOR AN HOUR WITHOUT A BREAK: WOULD NEVER DOZE
HOW LIKELY ARE YOU TO NOD OFF OR FALL ASLEEP WHILE SITTING AND TALKING TO SOMEONE: WOULD NEVER DOZE
HOW LIKELY ARE YOU TO NOD OFF OR FALL ASLEEP IN A CAR, WHILE STOPPED FOR A FEW MINUTES IN TRAFFIC: WOULD NEVER DOZE
HOW LIKELY ARE YOU TO NOD OFF OR FALL ASLEEP WHILE SITTING INACTIVE IN A PUBLIC PLACE: SLIGHT CHANCE OF DOZING
HOW LIKELY ARE YOU TO NOD OFF OR FALL ASLEEP WHILE SITTING AND READING: SLIGHT CHANCE OF DOZING
HOW LIKELY ARE YOU TO NOD OFF OR FALL ASLEEP WHILE SITTING QUIETLY AFTER LUNCH WITHOUT ALCOHOL: WOULD NEVER DOZE

## 2023-07-11 NOTE — PROGRESS NOTES
Additional 10 minutes on the date of service was spent performing the following:    -Preparing to see the patient (eg, review of tests)   -Ordering medications, tests, or procedures   -Documenting clinical information in the electronic or other health record     Thank you for the opportunity to participate in the care of Walt Lambert.     He is a 72 year old  male patient who comes to the sleep medicine clinic for review of sleep study results. The patient had a sleep study performed on 06/23/2023 (AHI= 77 events per hour).  The patient was also found to have sleep-related hypoxia.  Optimal pressure was obtained at 13 cm of water.  The patient was also found to have periodic limb movement in sleep.  The patient informed me through [employer] correspondence that his CPAP machine is still working.    Assessment and Plan:  In summary Walt Lambert is a 72 year old year old male here for review of sleep study results.  1. Obstructive Sleep Apnea/Hypersomnia  I reviewed the results of the patient's sleep study with him in detail.  I congratulated him on his excellent CPAP usage.  I will narrow his pressure range to a pressure of 13-16 CWP.  Return to clinic annually.    2. . Periodic limb movement  Most likely due to his history of chronic renal failure.  Recommend watchful waiting for now.    Compliance Download data for 30 days:  Compliance: 97%  Pressure setting: APAP 10-20 CWP  95% pressure: 16.3 CWP  Leak: Minimal  Residual AHI: 1.9 events per hour  Mask Tolerance: Good  Skin irritation: None  DME: [facility]    Lab reviewed: Discussed with patient.    Cpap Fu Template    Question 7/11/2023  9:05 AM CDT - Filed by Patient   Do you use a CPAP Machine at home? Yes   Overall, on a scale of 0-10 how would you rate your CPAP? 9   Is your mask comfortable? Yes   Is your mask leaking? No   Do you notice snoring with mask on? No   Do you notice gasping arousals with mask on? No   Are you having significant oral  or nasal dryness? No   Is the pressure setting comfortable?    What type of mask do you use? Nasal Pillow   What is your typical bedtime? 8   How long does it take you to go to sleep on PAP therapy? 1hr   What time do you typically get out of bed for the day? 5   How many hours on average per night are you using PAP therapy? 12   How many hours are you sleeping per night? 8   Do you feel well rested in the morning? Yes         DWAYNE:  DWAYNE Total Score: 9  Total score - [place]: 4 (7/11/2023  9:07 AM)    Patient Active Problem List   Diagnosis     Diabetes mellitus (H)     Morbid obesity (H)     Cardiomyopathy (H)     Chronic obstructive pulmonary disease (H)     Hyperlipidemia     Hypertension     Sleep apnea     Insulin pump status     MAHMOOD (dyspnea on exertion)     Abnormal cardiac CT angiography     CAD (coronary artery disease)     S/P CABG (coronary artery bypass graft)     ARF (acute respiratory failure) (H)     Anemia due to blood loss, acute     Arteriosclerosis of coronary artery bypass graft     Body mass index (BMI) 50.0-59.9, adult     Pure hypertriglyceridemia     Peripheral venous insufficiency     Lymphedema     Chronic kidney disease, stage 1       Current Outpatient Medications   Medication Sig Dispense Refill     ADVAIR DISKUS 250-50 mcg/dose DISKUS [ADVAIR DISKUS 250-50 MCG/DOSE DISKUS] Inhale 1 puff 2 (two) times a day.       amLODIPine (NORVASC) 10 MG tablet [AMLODIPINE (NORVASC) 10 MG TABLET] 1 tab(s)       aspirin 81 MG EC tablet [ASPIRIN 81 MG EC TABLET] Take 81 mg by mouth daily.       atorvastatin (LIPITOR) 80 MG tablet Take 1 tablet (80 mg) by mouth daily 90 tablet 3     bumetanide (BUMEX) 2 MG tablet Take 1 tablet (2 mg) by mouth daily -Needs to schedule cardiology follow-up appt for further refills 100 tablet 0     Continuous Blood Gluc Sensor (FREESTYLE ANA M 14 DAY SENSOR) MISC Change every 14 days. 2 each 5     Digital Therapy (HELLO HEART BLOOD PRESSURE) KIT        Dulaglutide  "(TRULICITY) 3 MG/0.5ML SOPN Inject 3 mg Subcutaneous once a week 6 mL 3     insulin lispro (HUMALOG VIAL) 100 UNIT/ML vial Use 400 units daily via insulin pump 360 mL 1     lisinopril (ZESTRIL) 20 MG tablet Take 1 tablet (20 mg) by mouth 2 times daily 180 tablet 3     metoprolol tartrate (LOPRESSOR) 100 MG tablet Take 1 tablet (100 mg) by mouth 2 times daily 180 tablet 3     multivitamin (ONE A DAY) per tablet [MULTIVITAMIN (ONE A DAY) PER TABLET] Take 1 tablet by mouth daily.       omeprazole (PRILOSEC) 20 MG capsule [OMEPRAZOLE (PRILOSEC) 20 MG CAPSULE] Take 20 mg by mouth daily.       simvastatin (ZOCOR) 20 MG tablet Take 20 mg by mouth       triamcinolone (KENALOG) 0.025 % cream [TRIAMCINOLONE (KENALOG) 0.025 % CREAM] Apply 1 application topically 3 (three) times a day as needed.          No Known Allergies    Physical Exam:  /74   Pulse 73   Ht 1.778 m (5' 10\")   Wt (!) 170.9 kg (376 lb 12.8 oz)   SpO2 96%   BMI 54.07 kg/m    BMI:Body mass index is 54.07 kg/m .   GEN: NAD,   Head: Normocephalic.  EYES: PERRLA, EOMI  Psych: normal mood, normal affect     Labs/Studies:  - We reviewed the results of the overnight study as described on the HPI.     Lab Results   Component Value Date    PH 7.45 02/29/2020    PH 7.41 02/29/2020    PO2 121 (H) 02/28/2020    PO2 76 02/28/2020    PCO2 41 02/28/2020    PCO2 40 02/28/2020    HCO3 23.3 02/26/2020    HCO3 26.6 02/26/2020    LIDIA 2.6 02/28/2020    LIDIA 1.3 02/28/2020     No results found for: TSH  Lab Results   Component Value Date     (H) 02/13/2023     (H) 10/13/2022     Lab Results   Component Value Date    HGB 14.7 08/08/2022    HGB 13.0 (L) 03/03/2020     Lab Results   Component Value Date    BUN 23.9 (H) 02/13/2023    BUN 18 08/08/2022    CR 1.19 (H) 02/13/2023    CR 1.22 08/08/2022     Lab Results   Component Value Date    AST 69 (H) 05/13/2022    AST 43 (H) 02/19/2021    ALT 78 (H) 05/13/2022    ALT 68 (H) 02/19/2021    ALKPHOS 113 05/13/2022 "    ALKPHOS 134 (H) 02/19/2021    BILITOTAL 1.4 (H) 05/13/2022    BILITOTAL 1.1 (H) 02/19/2021     No results found for: UAMP, UBARB, BENZODIAZEUR, UCANN, UCOC, OPIT, UPCP    Recent Labs   Lab Test 02/13/23  0749 10/13/22  0750 08/08/22  0825     --  140   POTASSIUM 4.5  --  4.4   CHLORIDE 102  --  101   CO2 27  --  25   ANIONGAP 13  --  14   * 189* 149*   BUN 23.9*  --  18   CR 1.19*  --  1.22   VALERIA 9.3  --  9.4       No results found for: KELI      Patient verbalized understanding of these issues, agrees with the plan and all questions were answered today. Patient was given an opportuntity to voice any other symptoms or concerns not listed above. Patient did not have any other symptoms or concerns.      Gustavo Borjas DO  Board Certified in Internal Medicine and Sleep Medicine      (Note created with Dragon voice recognition and unintended spelling errors and word substitutions may occur)

## 2023-07-11 NOTE — NURSING NOTE
"Chief Complaint   Patient presents with     CPAP Follow Up       Initial /74   Pulse 73   Ht 1.778 m (5' 10\")   Wt (!) 170.9 kg (376 lb 12.8 oz)   SpO2 96%   BMI 54.07 kg/m   Estimated body mass index is 54.07 kg/m  as calculated from the following:    Height as of this encounter: 1.778 m (5' 10\").    Weight as of this encounter: 170.9 kg (376 lb 12.8 oz).    Medication Reconciliation: complete    Neck circumference:     DME: [facility]     Changed pressure settings to 13-16. Copy of sleep study given to patient.    Future [employer] message sent reminding patient of 1 year follow up appointment.     Alma Verma MA    "

## 2023-08-06 ENCOUNTER — HEALTH MAINTENANCE LETTER (OUTPATIENT)
Age: 73
End: 2023-08-06

## 2023-08-21 ENCOUNTER — LAB REQUISITION (OUTPATIENT)
Dept: LAB | Facility: CLINIC | Age: 73
End: 2023-08-21
Payer: COMMERCIAL

## 2023-08-21 ENCOUNTER — TRANSFERRED RECORDS (OUTPATIENT)
Dept: HEALTH INFORMATION MANAGEMENT | Facility: CLINIC | Age: 73
End: 2023-08-21

## 2023-08-21 DIAGNOSIS — E78.1 PURE HYPERGLYCERIDEMIA: ICD-10-CM

## 2023-08-21 DIAGNOSIS — E11.40 TYPE 2 DIABETES MELLITUS WITH DIABETIC NEUROPATHY, UNSPECIFIED (H): ICD-10-CM

## 2023-08-21 LAB
ALBUMIN SERPL BCG-MCNC: 4.1 G/DL (ref 3.5–5.2)
ALP SERPL-CCNC: 131 U/L (ref 40–129)
ALT SERPL W P-5'-P-CCNC: 76 U/L (ref 0–70)
ANION GAP SERPL CALCULATED.3IONS-SCNC: 12 MMOL/L (ref 7–15)
AST SERPL W P-5'-P-CCNC: 59 U/L (ref 0–45)
BILIRUB SERPL-MCNC: 1 MG/DL
BUN SERPL-MCNC: 19.3 MG/DL (ref 8–23)
CALCIUM SERPL-MCNC: 9.4 MG/DL (ref 8.8–10.2)
CHLORIDE SERPL-SCNC: 102 MMOL/L (ref 98–107)
CHOLEST SERPL-MCNC: 118 MG/DL
CREAT SERPL-MCNC: 1.15 MG/DL (ref 0.67–1.17)
CREAT UR-MCNC: 18.1 MG/DL
DEPRECATED HCO3 PLAS-SCNC: 28 MMOL/L (ref 22–29)
GFR SERPL CREATININE-BSD FRML MDRD: 68 ML/MIN/1.73M2
GLUCOSE SERPL-MCNC: 142 MG/DL (ref 70–99)
HDLC SERPL-MCNC: 38 MG/DL
LDLC SERPL CALC-MCNC: 42 MG/DL
MICROALBUMIN UR-MCNC: 45.7 MG/L
MICROALBUMIN/CREAT UR: 252.49 MG/G CR (ref 0–17)
NONHDLC SERPL-MCNC: 80 MG/DL
POTASSIUM SERPL-SCNC: 4.1 MMOL/L (ref 3.4–5.3)
PROT SERPL-MCNC: 6.6 G/DL (ref 6.4–8.3)
SODIUM SERPL-SCNC: 142 MMOL/L (ref 136–145)
TRIGL SERPL-MCNC: 188 MG/DL

## 2023-08-21 PROCEDURE — 82570 ASSAY OF URINE CREATININE: CPT | Mod: ORL | Performed by: FAMILY MEDICINE

## 2023-08-21 PROCEDURE — 80061 LIPID PANEL: CPT | Mod: ORL | Performed by: FAMILY MEDICINE

## 2023-08-21 PROCEDURE — 80053 COMPREHEN METABOLIC PANEL: CPT | Mod: ORL | Performed by: FAMILY MEDICINE

## 2023-08-25 DIAGNOSIS — E11.59 TYPE 2 DIABETES MELLITUS WITH OTHER CIRCULATORY COMPLICATION, WITH LONG-TERM CURRENT USE OF INSULIN (H): ICD-10-CM

## 2023-08-25 DIAGNOSIS — R06.09 DOE (DYSPNEA ON EXERTION): ICD-10-CM

## 2023-08-25 DIAGNOSIS — Z79.4 TYPE 2 DIABETES MELLITUS WITH OTHER CIRCULATORY COMPLICATION, WITH LONG-TERM CURRENT USE OF INSULIN (H): ICD-10-CM

## 2023-08-28 RX ORDER — BUMETANIDE 2 MG/1
2 TABLET ORAL DAILY
Qty: 90 TABLET | Refills: 1 | Status: SHIPPED | OUTPATIENT
Start: 2023-08-28 | End: 2024-01-02

## 2023-09-18 ENCOUNTER — HOSPITAL ENCOUNTER (OUTPATIENT)
Dept: RADIOLOGY | Facility: CLINIC | Age: 73
Discharge: HOME | End: 2023-09-18
Attending: INTERNAL MEDICINE | Admitting: INTERNAL MEDICINE
Payer: COMMERCIAL

## 2023-09-18 DIAGNOSIS — R06.09 DYSPNEA ON EXERTION: ICD-10-CM

## 2023-09-18 PROCEDURE — 71046 X-RAY EXAM CHEST 2 VIEWS: CPT

## 2023-09-20 NOTE — PROGRESS NOTES
Pulmonary Clinic Follow-Up            Assessment/Plan:     Walt Lambert is a 72 year old male with a past medical history significant for Afib, CAD, YANE, presumed asthma who presents to clinic today for follow up.      Dyspnea on exertion  Mild persistent asthma  Patient originally presented to our clinic in 8/2022 for evaluation of dyspnea on exertion.  Pulmonary function testing with mild obstruction (FEV1 69%) and air-trapping, with reversibility after bronchodilator.  6 minute walk test without desaturations.  Patient's BMI is more than 50.  Cause of dyspnea was thought to be most likely multifactorial secondary to morbid obesity, deconditioning, cardiac history, and asthma.  He reports stable breathing since last visit, he did try to wean off Advair but noticed difficulty taking deep breaths, so he started up again.  Plan:  - reviewed recent CXR with patient today  - continue Advair Diskus (fluticasone/salmeterol) 250/50, one inhalation BID, rinse/gargle after use.  - continue cpap at night and with naps, follow with sleep medicine as ordered  - continue to stay active and increase activity as able  - offered referral to pulmonary rehab, he declines  - he is UTD with COVID vaccine and booster, and pneumococcal vaccine.  Recommend annual influenza vaccine.  - Action plan: prednisone 40mg x5 days    Follow-up  - annually      Rosanna Goodwin CNP  Pulmonary Medicine  [facility]  [phone]       CC:     Dyspnea follow-up     HPI:     Walt Lambert is a 72 year old male with a past medical history significant for Afib, CAD, YANE, presumed asthma who presents to clinic today for follow up.      Since last visit (6/2023, Dr Dempsey), he reports stable respiratory status.  Did stop advair for some time, but notices breathing is better now that back on.  SOB with activity.  Oxygen saturations on average 95%, he states walk tests in clinic in past were above 90%.  Denies chest  pain/pressure.  Seeing sleep medicine, on cpap.       ROS:     10-point ROS performed and is negative aside from those listed in HPI.     Medical history:         PMHx:  Past Medical History:   Diagnosis Date    Asthma     Chronic obstructive pulmonary disease (H) 9/29/2019    Diabetes mellitus (H)     insulin pump    Diabetic neuropathy (H)     GERD (gastroesophageal reflux disease)     Hyperlipidemia     Hypertension     Morbid obesity (H)     Sleep apnea     uses CPAP     Past Surgical History:   Procedure Laterality Date    COLONOSCOPY N/A 9/20/2017    Procedure: COLONOSCOPY;  Surgeon: Michael Fagan MD;  Location: [facility];  Service:     CV CORONARY ANGIOGRAM N/A 2/19/2020    Procedure: Coronary Angiogram;  Surgeon: Daniel Hayden MD;  Location: [facility] Cath Lab;  Service: Cardiology    CV LEFT HEART CATHETERIZATION WITH LEFT VENTRICULOGRAM N/A 2/19/2020    Procedure: Left Heart Catheterization with Left Ventriculogram;  Surgeon: Daniel Hayden MD;  Location: [facility] Cath Lab;  Service: Cardiology    GALLBLADDER SURGERY           Social history  Social History     Tobacco Use    Smoking status: Former    Smokeless tobacco: Never    Tobacco comments:     quit 40 years ago   Vaping Use    Vaping Use: Never used   Substance Use Topics    Alcohol use: Yes     Comment: Alcoholic Drinks/day: one beer every two weeks     Drug use: No   Ex smoker  Retired. Used to be  for 3 M.     Current Outpatient Medications   Medication    ADVAIR DISKUS 250-50 mcg/dose DISKUS    amLODIPine (NORVASC) 10 MG tablet    aspirin 81 MG EC tablet    atorvastatin (LIPITOR) 80 MG tablet    bumetanide (BUMEX) 2 MG tablet    Continuous Blood Gluc Sensor (FREESTYLE ANA M 2 SENSOR) MISC    Digital Therapy (HELLO HEART BLOOD PRESSURE) KIT    Dulaglutide (TRULICITY) 3 MG/0.5ML SOPN    insulin lispro (HUMALOG VIAL) 100 UNIT/ML vial    lisinopril (ZESTRIL) 20 MG tablet    metoprolol tartrate (LOPRESSOR) 100 MG tablet     multivitamin (ONE A DAY) per tablet    omeprazole (PRILOSEC) 20 MG capsule    simvastatin (ZOCOR) 20 MG tablet    triamcinolone (KENALOG) 0.025 % cream     No current facility-administered medications for this visit.          Data:       Labs/tests/Imaging results:    I have personally reviewed all pertinent imaging studies and PFT results unless otherwise noted.    PFT's on 8/2022:  Although the FEV1/FVC ratio is > LLN, the appearance of the flow volume curve and the reduction in FEV1 with bronchodilator response indicates at least mild obstruction with reversibility.   Air trapping is present.   Diffusing capacity for CO is normal.     Echo 6/2022:  1. The LV is normal in cavity size and wall thickness. The global systolic function is normal. The LVEF is 71%. There are no regional wall motion abnormalities.  2. The RV is normal in cavity size. The global systolic function is normal. The RVEF is 71%.   3. Mild left atrial enlargement, right atrium is normal in size.  4. There is no significant valvular disease.   5. Late gadolinium enhancement imaging shows a focus of mesocardial LGE in the basal anterior segment which  is a nonischemic pattern. Otherwise, no MI, fibrosis or infiltrative disease.   6. There is no pericardial effusion or thickening. No evidence of ventricular interdependence. No  pericardial adhesion on grid tagging.  7. There is no intracardiac thrombus.  8. T2 weighted imaging does not show convincing evidence of edema or inflammation, note  T2wi are slightly compromised by aliasing artifact      CONCLUSIONS:  Normal biventricular function with LVEF and RVEF of 71%. Focal mesocardial LGE in basal  anterior wall in a nonischemic pattern. Otherwise, no MI, fibrosis or infiltrative disease. No evidence of  pericarditis. No definite evidence of edema or acute inflammation on T2 weighted images.     CXR on 3/2020:     IMPRESSION:  Large lung volumes; correlate for obstructive lung disease. Minimal  basilar atelectasis / scarring. No focal consolidation. No pulmonary edema. No definitive pleural effusion. No pneumothorax. Stable mildly enlarged cardiac silhouette. Prior   sternotomy and CABG.      Current Meds:  Current Outpatient Medications   Medication Sig Dispense Refill    ADVAIR DISKUS 250-50 mcg/dose DISKUS [ADVAIR DISKUS 250-50 MCG/DOSE DISKUS] Inhale 1 puff 2 (two) times a day.      amLODIPine (NORVASC) 10 MG tablet [AMLODIPINE (NORVASC) 10 MG TABLET] 1 tab(s)      aspirin 81 MG EC tablet [ASPIRIN 81 MG EC TABLET] Take 81 mg by mouth daily.      atorvastatin (LIPITOR) 80 MG tablet Take 1 tablet (80 mg) by mouth daily 90 tablet 3    bumetanide (BUMEX) 2 MG tablet Take 1 tablet (2 mg) by mouth daily 90 tablet 1    Continuous Blood Gluc Sensor (FREESTYLE ANA M 2 SENSOR) MISC CHANGE EVERY 14 DAYS 2 each 5    Digital Therapy (HELLO HEART BLOOD PRESSURE) KIT       Dulaglutide (TRULICITY) 3 MG/0.5ML SOPN Inject 3 mg Subcutaneous once a week 6 mL 3    insulin lispro (HUMALOG VIAL) 100 UNIT/ML vial Use 400 units daily via insulin pump 360 mL 1    lisinopril (ZESTRIL) 20 MG tablet Take 1 tablet (20 mg) by mouth 2 times daily 180 tablet 3    metoprolol tartrate (LOPRESSOR) 100 MG tablet Take 1 tablet (100 mg) by mouth 2 times daily 180 tablet 3    multivitamin (ONE A DAY) per tablet [MULTIVITAMIN (ONE A DAY) PER TABLET] Take 1 tablet by mouth daily.      omeprazole (PRILOSEC) 20 MG capsule [OMEPRAZOLE (PRILOSEC) 20 MG CAPSULE] Take 20 mg by mouth daily.      simvastatin (ZOCOR) 20 MG tablet Take 20 mg by mouth      triamcinolone (KENALOG) 0.025 % cream [TRIAMCINOLONE (KENALOG) 0.025 % CREAM] Apply 1 application topically 3 (three) times a day as needed.          Labs:  @richie@  Lab Results   Component Value Date    WBC 10.7 03/03/2020    HGB 14.7 08/08/2022    HCT 41.6 03/03/2020     03/03/2020     08/21/2023    POTASSIUM 4.1 08/21/2023    CHLORIDE 102 08/21/2023    CO2 28 08/21/2023     (H)  08/21/2023    BUN 19.3 08/21/2023    CR 1.15 08/21/2023    MAG 2.0 03/02/2020    INR 1.42 (H) 02/27/2020    BILITOTAL 1.0 08/21/2023    AST 59 (H) 08/21/2023    ALT 76 (H) 08/21/2023    ALKPHOS 131 (H) 08/21/2023    PROTTOTAL 6.6 08/21/2023    ALBUMIN 4.1 08/21/2023        Physical Exam:       /68 (BP Location: Right arm, Patient Position: Chair, Cuff Size: Adult Large)   Pulse 70   Wt (!) 170.1 kg (375 lb)   SpO2 95%   BMI 53.81 kg/m      Gen: adult male, obese, appears in NAD  HEENT: clear conjunctivae, moist mucous membranes  CV: RRR, no M/G/R  Resp: CTAB, no focal crackles or wheezes.  Respirations even and unlabored.  On RA.   Skin: no apparent rashes on visible skin  Ext: no cyanosis, clubbing or edema  Neuro: alert and answering questions appropriately

## 2023-09-21 ENCOUNTER — OFFICE VISIT (OUTPATIENT)
Dept: PULMONOLOGY | Facility: CLINIC | Age: 73
End: 2023-09-21
Payer: COMMERCIAL

## 2023-09-21 VITALS
OXYGEN SATURATION: 95 % | DIASTOLIC BLOOD PRESSURE: 68 MMHG | SYSTOLIC BLOOD PRESSURE: 120 MMHG | HEART RATE: 70 BPM | WEIGHT: 315 LBS | BODY MASS INDEX: 53.81 KG/M2

## 2023-09-21 DIAGNOSIS — J45.30 MILD PERSISTENT ASTHMA WITHOUT COMPLICATION: ICD-10-CM

## 2023-09-21 DIAGNOSIS — R06.09 DYSPNEA ON EXERTION: Primary | ICD-10-CM

## 2023-09-21 PROCEDURE — 99214 OFFICE O/P EST MOD 30 MIN: CPT | Performed by: NURSE PRACTITIONER

## 2023-09-21 NOTE — PATIENT INSTRUCTIONS
It was a pleasure to see you in clinic today.   Here is what we discussed:    Continue Advair one inhalation, twice daily, rinse/gargle after use.  Call us with any change or worsening of your breathing.  Follow-up annually.    Rosanna Goodwin, CNP  Pulmonary Medicine  [facility]  [phone]

## 2023-09-26 ENCOUNTER — OFFICE VISIT (OUTPATIENT)
Dept: PODIATRY | Facility: CLINIC | Age: 73
End: 2023-09-26
Payer: COMMERCIAL

## 2023-09-26 VITALS — HEART RATE: 76 BPM | OXYGEN SATURATION: 93 %

## 2023-09-26 DIAGNOSIS — M20.41 HAMMERTOE, BILATERAL: ICD-10-CM

## 2023-09-26 DIAGNOSIS — B35.1 ONYCHOMYCOSIS: Primary | ICD-10-CM

## 2023-09-26 DIAGNOSIS — M20.42 HAMMERTOE, BILATERAL: ICD-10-CM

## 2023-09-26 DIAGNOSIS — L74.4 ANHIDROSIS: ICD-10-CM

## 2023-09-26 PROCEDURE — 99213 OFFICE O/P EST LOW 20 MIN: CPT | Performed by: PODIATRIST

## 2023-09-26 ASSESSMENT — PAIN SCALES - GENERAL: PAINLEVEL: NO PAIN (0)

## 2023-09-26 NOTE — PATIENT INSTRUCTIONS
Podiatry Clinics that offer foot and toenail care  [facility]  [facility]  [phone]    Foot and Ankle Clinics, [facility]  [phone]    [facility]  [place] - [address] on Tuesdays  [phone]    [facility]  [place]  [phone]    [facility]  [facility]  [phone]    [facility]  [phone]    [facility]  [phone]      [facility]  *Nurse comes to your home for nail care.  Nancy Zacarias RN Foot Specialist  [phone]

## 2023-09-26 NOTE — LETTER
9/26/2023         RE: Walt Lambert  [address] [place] [id number]        Dear Colleague,    Thank you for referring your patient, Walt Lambert, to the [facility]. Please see a copy of my visit note below.          FOOT AND ANKLE SURGERY/PODIATRY CONSULT NOTE         ASSESSMENT:   Onychomycosis  Anhidrosis  Hammertoe  DM 2      TREATMENT:  -I discussed with the patient that overall his feet are in good condition.  No obvious signs of skin irritation or skin breakdown.    -We discussed importance of good glycemic control.  Patient's most recent hemoglobin A1c was 6.8% in May of this year.    -I refer the patient to local routine foot care providers for regular nail trimming.    -Patient's questions invited and answered.  He was encouraged to call my office with any further questions or concerns.     Genaro Hernandez DPM  [facility] Podiatry/Foot & Ankle Surgery      HPI: I was asked to see Walt Lambert today for a diabetic foot exam.  Patient denies pain in his feet but does admit feeling a tingling burning pain upon occasion.  He also has trouble trimming his nails and would like referrals for this concern.    Past Medical History:   Diagnosis Date     Asthma      Chronic obstructive pulmonary disease (H) 9/29/2019     Diabetes mellitus (H)     insulin pump     Diabetic neuropathy (H)      GERD (gastroesophageal reflux disease)      Hyperlipidemia      Hypertension      Morbid obesity (H)      Sleep apnea     uses CPAP         Social History     Socioeconomic History     Marital status:      Spouse name: Not on file     Number of children: Not on file     Years of education: Not on file     Highest education level: Not on file   Occupational History     Not on file   Tobacco Use     Smoking status: Former     Smokeless tobacco: Never     Tobacco comments:     quit 40 years ago   Vaping Use     Vaping Use: Never used   Substance and Sexual  Activity     Alcohol use: Yes     Comment: Alcoholic Drinks/day: one beer every two weeks      Drug use: No     Sexual activity: Not on file   Other Topics Concern     Not on file   Social History Narrative     Not on file     Social Determinants of Health     Financial Resource Strain: Not on file   Food Insecurity: Not on file   Transportation Needs: Not on file   Physical Activity: Not on file   Stress: Not on file   Social Connections: Not on file   Interpersonal Safety: Not on file   Housing Stability: Not on file          No Known Allergies      MEDICATIONS:   Current Outpatient Medications   Medication     ADVAIR DISKUS 250-50 mcg/dose DISKUS     amLODIPine (NORVASC) 10 MG tablet     aspirin 81 MG EC tablet     atorvastatin (LIPITOR) 80 MG tablet     bumetanide (BUMEX) 2 MG tablet     Continuous Blood Gluc Sensor (FREESTYLE ANA M 2 SENSOR) MISC     Digital Therapy (HELLO HEART BLOOD PRESSURE) KIT     Dulaglutide (TRULICITY) 3 MG/0.5ML SOPN     insulin lispro (HUMALOG VIAL) 100 UNIT/ML vial     lisinopril (ZESTRIL) 20 MG tablet     metoprolol tartrate (LOPRESSOR) 100 MG tablet     multivitamin (ONE A DAY) per tablet     omeprazole (PRILOSEC) 20 MG capsule     simvastatin (ZOCOR) 20 MG tablet     triamcinolone (KENALOG) 0.025 % cream     No current facility-administered medications for this visit.        Family History   Problem Relation Age of Onset     Leukemia Mother      Heart Failure Mother      Snoring Mother      Leukemia Father      Snoring Father      Colon Cancer Sister           Review of Systems - 10 point Review of Systems is negative except for diabetic foot exam which is noted in HPI.    OBJECTIVE:  Appearance: alert, well appearing, and in no distress.    VITAL SIGNS: Pulse 76   SpO2 93%       General appearance: Patient is alert and fully cooperative with history & exam.  No sign of distress is noted during the visit.     Psychiatric: Affect is pleasant & appropriate.  Patient appears  motivated to improve health.     Respiratory: Breathing is regular & unlabored while sitting.     HEENT: Hearing is intact to spoken word.  Speech is clear.  No gross evidence of visual impairment that would impact ambulation.      Vascular: Dorsalis pedis palpable.   Dermatologic: Dry, flaky skin bilateral feet.  Dystrophic nails bilateral feet.  No erythema bilaterally.  Neurologic: Diminished light touch bilateral feet.  Musculoskeletal: Contracted digits bilateral feet.          Again, thank you for allowing me to participate in the care of your patient.        Sincerely,        Genaro Hernandez DPM

## 2023-09-26 NOTE — PROGRESS NOTES
FOOT AND ANKLE SURGERY/PODIATRY CONSULT NOTE         ASSESSMENT:   Onychomycosis  Anhidrosis  Sofia  DM 2      TREATMENT:  -I discussed with the patient that overall his feet are in good condition.  No obvious signs of skin irritation or skin breakdown.    -We discussed importance of good glycemic control.  Patient's most recent hemoglobin A1c was 6.8% in May of this year.    -I refer the patient to local routine foot care providers for regular nail trimming.    -Patient's questions invited and answered.  He was encouraged to call my office with any further questions or concerns.     Genaro Hernandez DPM  [facility] Podiatry/Foot & Ankle Surgery      HPI: I was asked to see Walt Lambert today for a diabetic foot exam.  Patient denies pain in his feet but does admit feeling a tingling burning pain upon occasion.  He also has trouble trimming his nails and would like referrals for this concern.    Past Medical History:   Diagnosis Date    Asthma     Chronic obstructive pulmonary disease (H) 9/29/2019    Diabetes mellitus (H)     insulin pump    Diabetic neuropathy (H)     GERD (gastroesophageal reflux disease)     Hyperlipidemia     Hypertension     Morbid obesity (H)     Sleep apnea     uses CPAP         Social History     Socioeconomic History    Marital status:      Spouse name: Not on file    Number of children: Not on file    Years of education: Not on file    Highest education level: Not on file   Occupational History    Not on file   Tobacco Use    Smoking status: Former    Smokeless tobacco: Never    Tobacco comments:     quit 40 years ago   Vaping Use    Vaping Use: Never used   Substance and Sexual Activity    Alcohol use: Yes     Comment: Alcoholic Drinks/day: one beer every two weeks     Drug use: No    Sexual activity: Not on file   Other Topics Concern    Not on file   Social History Narrative    Not on file     Social Determinants of Health     Financial Resource Strain: Not  on file   Food Insecurity: Not on file   Transportation Needs: Not on file   Physical Activity: Not on file   Stress: Not on file   Social Connections: Not on file   Interpersonal Safety: Not on file   Housing Stability: Not on file          No Known Allergies      MEDICATIONS:   Current Outpatient Medications   Medication    ADVAIR DISKUS 250-50 mcg/dose DISKUS    amLODIPine (NORVASC) 10 MG tablet    aspirin 81 MG EC tablet    atorvastatin (LIPITOR) 80 MG tablet    bumetanide (BUMEX) 2 MG tablet    Continuous Blood Gluc Sensor (FREESTYLE ANA M 2 SENSOR) MISC    Digital Therapy (HELLO HEART BLOOD PRESSURE) KIT    Dulaglutide (TRULICITY) 3 MG/0.5ML SOPN    insulin lispro (HUMALOG VIAL) 100 UNIT/ML vial    lisinopril (ZESTRIL) 20 MG tablet    metoprolol tartrate (LOPRESSOR) 100 MG tablet    multivitamin (ONE A DAY) per tablet    omeprazole (PRILOSEC) 20 MG capsule    simvastatin (ZOCOR) 20 MG tablet    triamcinolone (KENALOG) 0.025 % cream     No current facility-administered medications for this visit.        Family History   Problem Relation Age of Onset    Leukemia Mother     Heart Failure Mother     Snoring Mother     Leukemia Father     Snoring Father     Colon Cancer Sister           Review of Systems - 10 point Review of Systems is negative except for diabetic foot exam which is noted in HPI.    OBJECTIVE:  Appearance: alert, well appearing, and in no distress.    VITAL SIGNS: Pulse 76   SpO2 93%       General appearance: Patient is alert and fully cooperative with history & exam.  No sign of distress is noted during the visit.     Psychiatric: Affect is pleasant & appropriate.  Patient appears motivated to improve health.     Respiratory: Breathing is regular & unlabored while sitting.     HEENT: Hearing is intact to spoken word.  Speech is clear.  No gross evidence of visual impairment that would impact ambulation.      Vascular: Dorsalis pedis palpable.   Dermatologic: Dry, flaky skin bilateral feet.   Dystrophic nails bilateral feet.  No erythema bilaterally.  Neurologic: Diminished light touch bilateral feet.  Musculoskeletal: Contracted digits bilateral feet.

## 2023-09-29 ENCOUNTER — OFFICE VISIT (OUTPATIENT)
Dept: CARDIOLOGY | Facility: CLINIC | Age: 73
End: 2023-09-29
Payer: COMMERCIAL

## 2023-09-29 VITALS
HEIGHT: 69 IN | WEIGHT: 315 LBS | BODY MASS INDEX: 46.65 KG/M2 | RESPIRATION RATE: 28 BRPM | SYSTOLIC BLOOD PRESSURE: 132 MMHG | DIASTOLIC BLOOD PRESSURE: 76 MMHG | HEART RATE: 120 BPM

## 2023-09-29 DIAGNOSIS — E66.813 CLASS 3 SEVERE OBESITY DUE TO EXCESS CALORIES WITH SERIOUS COMORBIDITY AND BODY MASS INDEX (BMI) OF 50.0 TO 59.9 IN ADULT (H): ICD-10-CM

## 2023-09-29 DIAGNOSIS — E11.59 TYPE 2 DIABETES MELLITUS WITH OTHER CIRCULATORY COMPLICATION, WITH LONG-TERM CURRENT USE OF INSULIN (H): ICD-10-CM

## 2023-09-29 DIAGNOSIS — E66.01 CLASS 3 SEVERE OBESITY DUE TO EXCESS CALORIES WITH SERIOUS COMORBIDITY AND BODY MASS INDEX (BMI) OF 50.0 TO 59.9 IN ADULT (H): ICD-10-CM

## 2023-09-29 DIAGNOSIS — Z79.4 TYPE 2 DIABETES MELLITUS WITH OTHER CIRCULATORY COMPLICATION, WITH LONG-TERM CURRENT USE OF INSULIN (H): ICD-10-CM

## 2023-09-29 DIAGNOSIS — Z95.1 S/P CABG (CORONARY ARTERY BYPASS GRAFT): ICD-10-CM

## 2023-09-29 DIAGNOSIS — I25.10 CORONARY ARTERY DISEASE INVOLVING NATIVE CORONARY ARTERY OF NATIVE HEART WITHOUT ANGINA PECTORIS: Primary | ICD-10-CM

## 2023-09-29 DIAGNOSIS — R06.09 DOE (DYSPNEA ON EXERTION): ICD-10-CM

## 2023-09-29 PROCEDURE — 99214 OFFICE O/P EST MOD 30 MIN: CPT | Performed by: INTERNAL MEDICINE

## 2023-09-29 NOTE — PROGRESS NOTES
[facility]   [address]  [place] [address]   [email or web address]   OFFICE: [phone]     CARDIOLOGY CLINIC NOTE     Thank you, Piyush Reeves, for asking the [facility] Heart Care team to see Mr. Walt Lambert to evaluate Follow Up (Follow up from 7/6/22)        Assessment/Recommendations   Assessment:    Coronary artery disease s/p CABG (LIMA to LAD, L radial artery to PDA, SVG to OM and SVG to Diagonal branch) by Dr. Russell on 2/26/2020 - stable without angina.  Hypertension - controlled.  Hyperlipidemia - on appropriate statin  Morbid obesity due to excess calories with comorbid conditions and BMI 56 - uncontrolled  Insulin dependent DMII - much better controlled with A1C of 6.8  Chronic dyspnea on exertion - multifactorial due to deconditioning weight, HFpEF, and asthma. Also follows in pulmonary clinic.    Plan:    Trial of decreasing metoprolol to 50mg in the morning and 100 mg in the evening to see if his HR can increase more with activity.   Continue other medications without changes  Advised lifestyle changes through diet/exercise to lose weight.  Follow up in a year or sooner if needed         History of Present Illness   Mr. Walt Lambert is a 72 year old male who presents for follow-up of his coronary artery disease.     Mr. Lambert had a coronary artery bypass graft surgery in early February, 2020. This was performed after a stress test ordered for dyspnea on exertion was suggestive of ischemia vs artifact and a CTA coronary angiogram revealed severely elevated CAC and multivessel CAD. He has chronic dyspnea on exertion that is multifactorial and due to morbid obesity, deconditioning, HFpEF, and asthma.     Currently, he reports some improvement in his breathing over the past year, but he is still limited. He has SOB with walking to the end of his driveway. He does report that his heart rate doesn't ever climb above 100 bpm,  even with activity. No chest pain. Otherwise no new cardiac symptoms.      Other than noted above, Mr. Lambert denies any chest pain/pressure/tightness, shortness of breath at rest or with exertion, light headedness/dizziness, pre-syncope, syncope, lower extremity swelling, palpitations, paroxysmal nocturnal dyspnea (PND), or orthopnea.     Cardiac Problems and Cardiac Diagnostics     Most Recent Cardiac testing:    Cardiac MRI 6/27/22  CONCLUSIONS:  Normal biventricular function with LVEF and RVEF of 71%. Focal mesocardial LGE in basal  anterior wall in a nonischemic pattern. Otherwise, no MI, fibrosis or infiltrative disease. No evidence of  pericarditis. No definite evidence of edema or acute inflammation on T2 weighted images.     TTE 2/6/2020  1. Normal left ventricular size and systolic performance with a visually estimated ejection fraction of 65%.   2. There is mild to moderate concentric increase in left ventricular wall thickness.   3. No significant valvular heart disease is identified on this study.   4. Normal right ventricular size and systolic performance.      Stress test: dated 12/24/2019 revealed    The nuclear stress test is abnormal.    There is a small area of mild ischemia in the inferior segment(s) of the left ventricle. Cannot exclude artifact.    The left ventricular ejection fraction at stress is 61%.    A prior study was conducted on 3/10/2015.  This study has changes noted when compared with the prior study:  mild inferior wall ischemia is now present     CT coronary angiogram with CAC 2/6/2020  The patient's image quality is poor due to morbid obesity.  The contrast did not fill coronary artery well.     1.  The total Agatston calcium score is 3784. A calcium score in this range places the individual in the 100th percentile when compared to an age and gender matched control group and implies a very high risk of cardiac events in the next ten years.     2.  Mild disease in left main.      3.  Diffuse calcified three coronary arteries.  Due to poor image quality, cannot exclude severe stenosis in mid LAD, proximal LCX and mid RCA.     Recommend invasive coronary angiogram for further cardiac evaluation if clinically indicated.     Cardiac cath: from 2/19/2020 demonstrated   The left ventricular size is normal. The left ventricular systolic function is normal. LV systolic pressure is normal. LV end diastolic pressure is normal. There are no wall motion abnormalities in the left ventricle.  2nd Mrg lesion is 70% stenosed.  Ost LM lesion is 30% stenosed.  Ost LAD to Mid LAD lesion is 70% stenosed.  Mid LAD lesion is 50% stenosed.  Mid RCA lesion is 75% stenosed.  Prox RCA lesion is 90% stenosed.  Mild aortic valve gradient, possible aortic stenosis       Medications  Allergies   Current Outpatient Medications   Medication Sig Dispense Refill    ADVAIR DISKUS 250-50 mcg/dose DISKUS [ADVAIR DISKUS 250-50 MCG/DOSE DISKUS] Inhale 1 puff 2 (two) times a day.      amLODIPine (NORVASC) 10 MG tablet Take 10 mg by mouth daily      aspirin 81 MG EC tablet [ASPIRIN 81 MG EC TABLET] Take 81 mg by mouth daily.      atorvastatin (LIPITOR) 80 MG tablet Take 1 tablet (80 mg) by mouth daily 90 tablet 3    bumetanide (BUMEX) 2 MG tablet Take 1 tablet (2 mg) by mouth daily 90 tablet 1    Continuous Blood Gluc Sensor (FREESTYLE ANA M 2 SENSOR) MISC CHANGE EVERY 14 DAYS 2 each 5    Digital Therapy (HELLO HEART BLOOD PRESSURE) KIT       Dulaglutide (TRULICITY) 3 MG/0.5ML SOPN Inject 3 mg Subcutaneous once a week 6 mL 3    insulin lispro (HUMALOG VIAL) 100 UNIT/ML vial Use 400 units daily via insulin pump 360 mL 1    lisinopril (ZESTRIL) 20 MG tablet Take 1 tablet (20 mg) by mouth 2 times daily 180 tablet 3    metoprolol tartrate (LOPRESSOR) 100 MG tablet Take 1 tablet (100 mg) by mouth 2 times daily 180 tablet 3    multivitamin (ONE A DAY) per tablet [MULTIVITAMIN (ONE A DAY) PER TABLET] Take 1 tablet by mouth daily.       "omeprazole (PRILOSEC) 20 MG capsule [OMEPRAZOLE (PRILOSEC) 20 MG CAPSULE] Take 20 mg by mouth daily.      simvastatin (ZOCOR) 20 MG tablet Take 20 mg by mouth      triamcinolone (KENALOG) 0.025 % cream [TRIAMCINOLONE (KENALOG) 0.025 % CREAM] Apply 1 application topically 3 (three) times a day as needed.         No Known Allergies     Physical Examination Review of Systems   Vitals: /76 (BP Location: Left arm, Patient Position: Sitting, Cuff Size: Adult Large)   Pulse 120   Resp 28   Ht 1.753 m (5' 9\")   Wt (!) 171.8 kg (378 lb 12.8 oz)   BMI 55.94 kg/m    BMI= Body mass index is 55.94 kg/m .  Wt Readings from Last 3 Encounters:   09/29/23 (!) 171.8 kg (378 lb 12.8 oz)   09/21/23 (!) 170.1 kg (375 lb)   07/11/23 (!) 170.9 kg (376 lb 12.8 oz)       General: pleasant male. Morbidly obese No acute distress.  HENT: external ears normal. Nares patent. Mucous membranes moist.  Eyes: perrla, extraocular muscles intact. No scleral icterus.   Neck: No JVD  Lungs: clear to auscultation  COR: regular rate and rhythm, No murmurs, rubs, or gallops  Abd: nondistended, BS present  Extrem: No edema        Please refer above for cardiac ROS details.       Past History   Past Medical History:   Past Medical History:   Diagnosis Date    Asthma     Chronic obstructive pulmonary disease (H) 9/29/2019    Diabetes mellitus (H)     insulin pump    Diabetic neuropathy (H)     GERD (gastroesophageal reflux disease)     Hyperlipidemia     Hypertension     Morbid obesity (H)     Sleep apnea     uses CPAP        Past Surgical History:   Past Surgical History:   Procedure Laterality Date    COLONOSCOPY N/A 9/20/2017    Procedure: COLONOSCOPY;  Surgeon: Michael Fagan MD;  Location: [facility];  Service:     CV CORONARY ANGIOGRAM N/A 2/19/2020    Procedure: Coronary Angiogram;  Surgeon: Daniel Hayden MD;  Location: [facility] Cath Lab;  Service: Cardiology    CV LEFT HEART CATHETERIZATION WITH LEFT VENTRICULOGRAM N/A " 2/19/2020    Procedure: Left Heart Catheterization with Left Ventriculogram;  Surgeon: Daniel Hayden MD;  Location: [facility] Cath Lab;  Service: Cardiology    GALLBLADDER SURGERY          Family History:   Family History   Problem Relation Age of Onset    Leukemia Mother     Heart Failure Mother     Snoring Mother     Leukemia Father     Snoring Father     Colon Cancer Sister         Social History:   Social History     Socioeconomic History    Marital status:      Spouse name: Not on file    Number of children: Not on file    Years of education: Not on file    Highest education level: Not on file   Occupational History    Not on file   Tobacco Use    Smoking status: Former    Smokeless tobacco: Never    Tobacco comments:     quit 40 years ago   Vaping Use    Vaping Use: Never used   Substance and Sexual Activity    Alcohol use: Yes     Comment: Alcoholic Drinks/day: one beer every two weeks     Drug use: No    Sexual activity: Not on file   Other Topics Concern    Not on file   Social History Narrative    Not on file     Social Determinants of Health     Financial Resource Strain: Not on file   Food Insecurity: Not on file   Transportation Needs: Not on file   Physical Activity: Not on file   Stress: Not on file   Social Connections: Not on file   Interpersonal Safety: Not on file   Housing Stability: Not on file            Lab Results    Chemistry/lipid CBC Cardiac Enzymes/BNP/TSH/INR   Lab Results   Component Value Date    CHOL 118 08/21/2023    HDL 38 (L) 08/21/2023    TRIG 188 (H) 08/21/2023    BUN 19.3 08/21/2023     08/21/2023    CO2 28 08/21/2023    Lab Results   Component Value Date    WBC 10.7 03/03/2020    HGB 14.7 08/08/2022    HCT 41.6 03/03/2020    MCV 95 03/03/2020     03/03/2020    Lab Results   Component Value Date    BNP 37 05/31/2022    INR 1.42 (H) 02/27/2020

## 2023-09-29 NOTE — PATIENT INSTRUCTIONS
It was a pleasure to meet with you today.      Below is a summary of your visit.   Try decreasing your metoprolol to a half tablet in the morning and a full tablet in the evening. Monitor to see if your heart rate increases more with activity and whether you tolerate walking better with the lower dose of metoprolol.   Continue your other medications without changes.  I encourage making changes in your diet and increasing physical activity in an effort to lose weight.   Follow up with me in a year or sooner if needed.    Please do not hesitate to call the [facility] with any questions or concerns at [phone].     Sincerely,

## 2023-09-29 NOTE — LETTER
9/29/2023    Piyush Chung MD  [address]  [place] [address]    RE: Walt Lambert       Dear Colleague,     I had the pleasure of seeing Walt Lambert in the [facility].    [facility]   [address]  [place] [address]   [email or web address]   OFFICE: [phone]     CARDIOLOGY CLINIC NOTE     Thank you, Piyush Reeves, for asking the [facility] team to see Mr. Walt Lambert to evaluate Follow Up (Follow up from 7/6/22)        Assessment/Recommendations   Assessment:    Coronary artery disease s/p CABG (LIMA to LAD, L radial artery to PDA, SVG to OM and SVG to Diagonal branch) by Dr. Russell on 2/26/2020 - stable without angina.  Hypertension - controlled.  Hyperlipidemia - on appropriate statin  Morbid obesity due to excess calories with comorbid conditions and BMI 56 - uncontrolled  Insulin dependent DMII - much better controlled with A1C of 6.8  Chronic dyspnea on exertion - multifactorial due to deconditioning weight, HFpEF, and asthma. Also follows in pulmonary clinic.    Plan:    Trial of decreasing metoprolol to 50mg in the morning and 100 mg in the evening to see if his HR can increase more with activity.   Continue other medications without changes  Advised lifestyle changes through diet/exercise to lose weight.  Follow up in a year or sooner if needed         History of Present Illness   Mr. Walt Lambert is a 72 year old male who presents for follow-up of his coronary artery disease.     Mr. Lambert had a coronary artery bypass graft surgery in early February, 2020. This was performed after a stress test ordered for dyspnea on exertion was suggestive of ischemia vs artifact and a CTA coronary angiogram revealed severely elevated CAC and multivessel CAD. He has chronic dyspnea on exertion that is multifactorial and due to morbid obesity, deconditioning, HFpEF, and asthma.      Currently, he reports some improvement in his breathing over the past year, but he is still limited. He has SOB with walking to the end of his driveway. He does report that his heart rate doesn't ever climb above 100 bpm, even with activity. No chest pain. Otherwise no new cardiac symptoms.      Other than noted above, Mr. Lambert denies any chest pain/pressure/tightness, shortness of breath at rest or with exertion, light headedness/dizziness, pre-syncope, syncope, lower extremity swelling, palpitations, paroxysmal nocturnal dyspnea (PND), or orthopnea.     Cardiac Problems and Cardiac Diagnostics     Most Recent Cardiac testing:    Cardiac MRI 6/27/22  CONCLUSIONS:  Normal biventricular function with LVEF and RVEF of 71%. Focal mesocardial LGE in basal  anterior wall in a nonischemic pattern. Otherwise, no MI, fibrosis or infiltrative disease. No evidence of  pericarditis. No definite evidence of edema or acute inflammation on T2 weighted images.     TTE 2/6/2020  1. Normal left ventricular size and systolic performance with a visually estimated ejection fraction of 65%.   2. There is mild to moderate concentric increase in left ventricular wall thickness.   3. No significant valvular heart disease is identified on this study.   4. Normal right ventricular size and systolic performance.      Stress test: dated 12/24/2019 revealed    The nuclear stress test is abnormal.    There is a small area of mild ischemia in the inferior segment(s) of the left ventricle. Cannot exclude artifact.    The left ventricular ejection fraction at stress is 61%.    A prior study was conducted on 3/10/2015.  This study has changes noted when compared with the prior study:  mild inferior wall ischemia is now present     CT coronary angiogram with CAC 2/6/2020  The patient's image quality is poor due to morbid obesity.  The contrast did not fill coronary artery well.     1.  The total Agatston calcium score is 3784. A calcium score  in this range places the individual in the 100th percentile when compared to an age and gender matched control group and implies a very high risk of cardiac events in the next ten years.     2.  Mild disease in left main.     3.  Diffuse calcified three coronary arteries.  Due to poor image quality, cannot exclude severe stenosis in mid LAD, proximal LCX and mid RCA.     Recommend invasive coronary angiogram for further cardiac evaluation if clinically indicated.     Cardiac cath: from 2/19/2020 demonstrated   The left ventricular size is normal. The left ventricular systolic function is normal. LV systolic pressure is normal. LV end diastolic pressure is normal. There are no wall motion abnormalities in the left ventricle.  2nd Mrg lesion is 70% stenosed.  Ost LM lesion is 30% stenosed.  Ost LAD to Mid LAD lesion is 70% stenosed.  Mid LAD lesion is 50% stenosed.  Mid RCA lesion is 75% stenosed.  Prox RCA lesion is 90% stenosed.  Mild aortic valve gradient, possible aortic stenosis       Medications  Allergies   Current Outpatient Medications   Medication Sig Dispense Refill    ADVAIR DISKUS 250-50 mcg/dose DISKUS [ADVAIR DISKUS 250-50 MCG/DOSE DISKUS] Inhale 1 puff 2 (two) times a day.      amLODIPine (NORVASC) 10 MG tablet Take 10 mg by mouth daily      aspirin 81 MG EC tablet [ASPIRIN 81 MG EC TABLET] Take 81 mg by mouth daily.      atorvastatin (LIPITOR) 80 MG tablet Take 1 tablet (80 mg) by mouth daily 90 tablet 3    bumetanide (BUMEX) 2 MG tablet Take 1 tablet (2 mg) by mouth daily 90 tablet 1    Continuous Blood Gluc Sensor (FREESTYLE ANA M 2 SENSOR) MISC CHANGE EVERY 14 DAYS 2 each 5    Digital Therapy ([facility] BLOOD PRESSURE) KIT       Dulaglutide (TRULICITY) 3 MG/0.5ML SOPN Inject 3 mg Subcutaneous once a week 6 mL 3    insulin lispro (HUMALOG VIAL) 100 UNIT/ML vial Use 400 units daily via insulin pump 360 mL 1    lisinopril (ZESTRIL) 20 MG tablet Take 1 tablet (20 mg) by mouth 2 times daily 180 tablet  "3    metoprolol tartrate (LOPRESSOR) 100 MG tablet Take 1 tablet (100 mg) by mouth 2 times daily 180 tablet 3    multivitamin (ONE A DAY) per tablet [MULTIVITAMIN (ONE A DAY) PER TABLET] Take 1 tablet by mouth daily.      omeprazole (PRILOSEC) 20 MG capsule [OMEPRAZOLE (PRILOSEC) 20 MG CAPSULE] Take 20 mg by mouth daily.      simvastatin (ZOCOR) 20 MG tablet Take 20 mg by mouth      triamcinolone (KENALOG) 0.025 % cream [TRIAMCINOLONE (KENALOG) 0.025 % CREAM] Apply 1 application topically 3 (three) times a day as needed.         No Known Allergies     Physical Examination Review of Systems   Vitals: /76 (BP Location: Left arm, Patient Position: Sitting, Cuff Size: Adult Large)   Pulse 120   Resp 28   Ht 1.753 m (5' 9\")   Wt (!) 171.8 kg (378 lb 12.8 oz)   BMI 55.94 kg/m    BMI= Body mass index is 55.94 kg/m .  Wt Readings from Last 3 Encounters:   09/29/23 (!) 171.8 kg (378 lb 12.8 oz)   09/21/23 (!) 170.1 kg (375 lb)   07/11/23 (!) 170.9 kg (376 lb 12.8 oz)       General: pleasant male. Morbidly obese No acute distress.  HENT: external ears normal. Nares patent. Mucous membranes moist.  Eyes: perrla, extraocular muscles intact. No scleral icterus.   Neck: No JVD  Lungs: clear to auscultation  COR: regular rate and rhythm, No murmurs, rubs, or gallops  Abd: nondistended, BS present  Extrem: No edema        Please refer above for cardiac ROS details.       Past History   Past Medical History:   Past Medical History:   Diagnosis Date    Asthma     Chronic obstructive pulmonary disease (H) 9/29/2019    Diabetes mellitus (H)     insulin pump    Diabetic neuropathy (H)     GERD (gastroesophageal reflux disease)     Hyperlipidemia     Hypertension     Morbid obesity (H)     Sleep apnea     uses CPAP        Past Surgical History:   Past Surgical History:   Procedure Laterality Date    COLONOSCOPY N/A 9/20/2017    Procedure: COLONOSCOPY;  Surgeon: Michael Fagan MD;  Location: [facility];  Service:      " CORONARY ANGIOGRAM N/A 2/19/2020    Procedure: Coronary Angiogram;  Surgeon: Daniel Hayden MD;  Location: [facility];  Service: Cardiology    CV LEFT HEART CATHETERIZATION WITH LEFT VENTRICULOGRAM N/A 2/19/2020    Procedure: Left Heart Catheterization with Left Ventriculogram;  Surgeon: Daniel Hayden MD;  Location: [facility];  Service: Cardiology    GALLBLADDER SURGERY          Family History:   Family History   Problem Relation Age of Onset    Leukemia Mother     Heart Failure Mother     Snoring Mother     Leukemia Father     Snoring Father     Colon Cancer Sister         Social History:   Social History     Socioeconomic History    Marital status:      Spouse name: Not on file    Number of children: Not on file    Years of education: Not on file    Highest education level: Not on file   Occupational History    Not on file   Tobacco Use    Smoking status: Former    Smokeless tobacco: Never    Tobacco comments:     quit 40 years ago   Vaping Use    Vaping Use: Never used   Substance and Sexual Activity    Alcohol use: Yes     Comment: Alcoholic Drinks/day: one beer every two weeks     Drug use: No    Sexual activity: Not on file   Other Topics Concern    Not on file   Social History Narrative    Not on file     Social Determinants of Health     Financial Resource Strain: Not on file   Food Insecurity: Not on file   Transportation Needs: Not on file   Physical Activity: Not on file   Stress: Not on file   Social Connections: Not on file   Interpersonal Safety: Not on file   Housing Stability: Not on file            Lab Results    Chemistry/lipid CBC Cardiac Enzymes/BNP/TSH/INR   Lab Results   Component Value Date    CHOL 118 08/21/2023    HDL 38 (L) 08/21/2023    TRIG 188 (H) 08/21/2023    BUN 19.3 08/21/2023     08/21/2023    CO2 28 08/21/2023    Lab Results   Component Value Date    WBC 10.7 03/03/2020    HGB 14.7 08/08/2022    HCT 41.6 03/03/2020    MCV 95 03/03/2020      03/03/2020    Lab Results   Component Value Date    BNP 37 05/31/2022    INR 1.42 (H) 02/27/2020                Thank you for allowing me to participate in the care of your patient.      Sincerely,     Mitch Duncan MD     [facility]  cc:   Mitch Duncan MD  [address]  [place] [address]

## 2023-10-10 ENCOUNTER — LAB REQUISITION (OUTPATIENT)
Dept: LAB | Facility: CLINIC | Age: 73
End: 2023-10-10
Payer: COMMERCIAL

## 2023-10-10 DIAGNOSIS — E11.59 TYPE 2 DIABETES MELLITUS WITH OTHER CIRCULATORY COMPLICATIONS (H): ICD-10-CM

## 2023-10-10 LAB
ANION GAP SERPL CALCULATED.3IONS-SCNC: 14 MMOL/L (ref 7–15)
BUN SERPL-MCNC: 18 MG/DL (ref 8–23)
CALCIUM SERPL-MCNC: 9.1 MG/DL (ref 8.8–10.2)
CHLORIDE SERPL-SCNC: 99 MMOL/L (ref 98–107)
CREAT SERPL-MCNC: 1.12 MG/DL (ref 0.67–1.17)
DEPRECATED HCO3 PLAS-SCNC: 26 MMOL/L (ref 22–29)
EGFRCR SERPLBLD CKD-EPI 2021: 71 ML/MIN/1.73M2
ERYTHROCYTE [DISTWIDTH] IN BLOOD BY AUTOMATED COUNT: 14.4 % (ref 10–15)
GLUCOSE SERPL-MCNC: 129 MG/DL (ref 70–99)
HBA1C MFR BLD: 7.2 %
HCT VFR BLD AUTO: 45 % (ref 40–53)
HGB BLD-MCNC: 15.1 G/DL (ref 13.3–17.7)
MCH RBC QN AUTO: 29.7 PG (ref 26.5–33)
MCHC RBC AUTO-ENTMCNC: 33.6 G/DL (ref 31.5–36.5)
MCV RBC AUTO: 88 FL (ref 78–100)
PLATELET # BLD AUTO: 187 10E3/UL (ref 150–450)
POTASSIUM SERPL-SCNC: 4.1 MMOL/L (ref 3.4–5.3)
RBC # BLD AUTO: 5.09 10E6/UL (ref 4.4–5.9)
SODIUM SERPL-SCNC: 139 MMOL/L (ref 135–145)
WBC # BLD AUTO: 9 10E3/UL (ref 4–11)

## 2023-10-10 PROCEDURE — 83036 HEMOGLOBIN GLYCOSYLATED A1C: CPT | Mod: ORL | Performed by: FAMILY MEDICINE

## 2023-10-10 PROCEDURE — 80048 BASIC METABOLIC PNL TOTAL CA: CPT | Mod: ORL | Performed by: FAMILY MEDICINE

## 2023-10-10 PROCEDURE — 85027 COMPLETE CBC AUTOMATED: CPT | Mod: ORL | Performed by: FAMILY MEDICINE

## 2023-11-10 ENCOUNTER — TRANSFERRED RECORDS (OUTPATIENT)
Dept: HEALTH INFORMATION MANAGEMENT | Facility: CLINIC | Age: 73
End: 2023-11-10
Payer: COMMERCIAL

## 2023-11-11 DIAGNOSIS — Z79.4 TYPE 2 DIABETES MELLITUS WITH OTHER CIRCULATORY COMPLICATION, WITH LONG-TERM CURRENT USE OF INSULIN (H): ICD-10-CM

## 2023-11-11 DIAGNOSIS — E11.59 TYPE 2 DIABETES MELLITUS WITH OTHER CIRCULATORY COMPLICATION, WITH LONG-TERM CURRENT USE OF INSULIN (H): ICD-10-CM

## 2023-11-13 RX ORDER — INSULIN LISPRO 100 [IU]/ML
INJECTION, SOLUTION INTRAVENOUS; SUBCUTANEOUS
Qty: 120 ML | Refills: 0 | Status: SHIPPED | OUTPATIENT
Start: 2023-11-13 | End: 2023-12-01

## 2023-11-13 NOTE — TELEPHONE ENCOUNTER
"        Requested Prescriptions   Pending Prescriptions Disp Refills    insulin lispro (HUMALOG) 100 UNIT/ML vial [Pharmacy Med Name: HumaLOG 100 UNIT/ML Subcutaneous Solution] 400 mL 2     Sig: INJECT SUBCUTANEOUSLY 400 UNITS  DAILY VIA INSULIN PUMP       Short Acting Insulin Protocol Passed - 11/11/2023  9:09 PM        Passed - Serum creatinine on file in past 12 months     Recent Labs   Lab Test 10/10/23  1003   CR 1.12       Ok to refill medication if creatinine is low          Passed - HgbA1C in past 3 or 6 months     If HgbA1C is 8 or greater, it needs to be on file within the past 3 months.  If less than 8, must be on file within the past 6 months.     Recent Labs   Lab Test 10/10/23  1003   A1C 7.2*             Passed - Medication is active on med list        Passed - Patient is age 18 or older        Passed - Recent (6 mo) or future (30 days) visit within the authorizing provider's specialty     Patient had office visit in the last 6 months or has a visit in the next 30 days with authorizing provider or within the authorizing provider's specialty.  See \"Patient Info\" tab in inbasket, or \"Choose Columns\" in Meds & Orders section of the refill encounter.                 "

## 2023-11-14 ENCOUNTER — MEDICAL CORRESPONDENCE (OUTPATIENT)
Dept: HEALTH INFORMATION MANAGEMENT | Facility: CLINIC | Age: 73
End: 2023-11-14
Payer: COMMERCIAL

## 2023-11-15 ENCOUNTER — TRANSCRIBE ORDERS (OUTPATIENT)
Dept: OTHER | Age: 73
End: 2023-11-15

## 2023-11-15 ENCOUNTER — TELEPHONE (OUTPATIENT)
Dept: OPHTHALMOLOGY | Facility: CLINIC | Age: 73
End: 2023-11-15
Payer: COMMERCIAL

## 2023-11-15 DIAGNOSIS — H50.34 INTERMITTENT ALTERNATING EXOTROPIA: ICD-10-CM

## 2023-11-15 DIAGNOSIS — E11.39: ICD-10-CM

## 2023-11-15 DIAGNOSIS — H25.13 CATARACT, NUCLEAR SCLEROTIC SENILE, BILATERAL: Primary | ICD-10-CM

## 2023-11-15 NOTE — TELEPHONE ENCOUNTER
Called and spoke to Alvino     He was ok with his time / date for his upcoming appointment with Dr. Cortez - added appointment to the wait list     Massiel Tabor Communication Facilitator on 11/15/2023 at 3:28 PM    
unk

## 2023-11-16 ENCOUNTER — ANESTHESIA (OUTPATIENT)
Dept: SURGERY | Facility: CLINIC | Age: 73
End: 2023-11-16
Payer: COMMERCIAL

## 2023-11-16 ENCOUNTER — HOSPITAL ENCOUNTER (OUTPATIENT)
Facility: CLINIC | Age: 73
Discharge: HOME | End: 2023-11-16
Attending: INTERNAL MEDICINE | Admitting: INTERNAL MEDICINE
Payer: COMMERCIAL

## 2023-11-16 ENCOUNTER — ANESTHESIA EVENT (OUTPATIENT)
Dept: SURGERY | Facility: CLINIC | Age: 73
End: 2023-11-16
Payer: COMMERCIAL

## 2023-11-16 VITALS
HEIGHT: 70 IN | OXYGEN SATURATION: 95 % | SYSTOLIC BLOOD PRESSURE: 132 MMHG | DIASTOLIC BLOOD PRESSURE: 76 MMHG | WEIGHT: 315 LBS | RESPIRATION RATE: 16 BRPM | TEMPERATURE: 97.6 F | HEART RATE: 63 BPM | BODY MASS INDEX: 45.1 KG/M2

## 2023-11-16 LAB
COLONOSCOPY: NORMAL
GLUCOSE BLDC GLUCOMTR-MCNC: 169 MG/DL (ref 70–99)

## 2023-11-16 PROCEDURE — 258N000003 HC RX IP 258 OP 636: Performed by: ANESTHESIOLOGY

## 2023-11-16 PROCEDURE — 88305 TISSUE EXAM BY PATHOLOGIST: CPT | Mod: TC | Performed by: INTERNAL MEDICINE

## 2023-11-16 PROCEDURE — 82962 GLUCOSE BLOOD TEST: CPT

## 2023-11-16 PROCEDURE — 710N000012 HC RECOVERY PHASE 2, PER MINUTE: Performed by: INTERNAL MEDICINE

## 2023-11-16 PROCEDURE — 370N000017 HC ANESTHESIA TECHNICAL FEE, PER MIN: Performed by: INTERNAL MEDICINE

## 2023-11-16 PROCEDURE — 272N000001 HC OR GENERAL SUPPLY STERILE: Performed by: INTERNAL MEDICINE

## 2023-11-16 PROCEDURE — 999N000141 HC STATISTIC PRE-PROCEDURE NURSING ASSESSMENT: Performed by: INTERNAL MEDICINE

## 2023-11-16 PROCEDURE — 250N000011 HC RX IP 250 OP 636: Performed by: NURSE ANESTHETIST, CERTIFIED REGISTERED

## 2023-11-16 PROCEDURE — 250N000009 HC RX 250: Performed by: NURSE ANESTHETIST, CERTIFIED REGISTERED

## 2023-11-16 PROCEDURE — 360N000075 HC SURGERY LEVEL 2, PER MIN: Performed by: INTERNAL MEDICINE

## 2023-11-16 RX ORDER — PROPOFOL 10 MG/ML
INJECTION, EMULSION INTRAVENOUS PRN
Status: DISCONTINUED | OUTPATIENT
Start: 2023-11-16 | End: 2023-11-16

## 2023-11-16 RX ORDER — NALOXONE HYDROCHLORIDE 0.4 MG/ML
0.2 INJECTION, SOLUTION INTRAMUSCULAR; INTRAVENOUS; SUBCUTANEOUS
Status: CANCELLED | OUTPATIENT
Start: 2023-11-16

## 2023-11-16 RX ORDER — FLUMAZENIL 0.1 MG/ML
0.2 INJECTION, SOLUTION INTRAVENOUS
Status: CANCELLED | OUTPATIENT
Start: 2023-11-16 | End: 2023-11-16

## 2023-11-16 RX ORDER — ONDANSETRON 4 MG/1
4 TABLET, ORALLY DISINTEGRATING ORAL EVERY 6 HOURS PRN
Status: CANCELLED | OUTPATIENT
Start: 2023-11-16

## 2023-11-16 RX ORDER — OXYCODONE HYDROCHLORIDE 5 MG/1
5 TABLET ORAL
Status: CANCELLED | OUTPATIENT
Start: 2023-11-16

## 2023-11-16 RX ORDER — PROPOFOL 10 MG/ML
INJECTION, EMULSION INTRAVENOUS CONTINUOUS PRN
Status: DISCONTINUED | OUTPATIENT
Start: 2023-11-16 | End: 2023-11-16

## 2023-11-16 RX ORDER — ONDANSETRON 4 MG/1
4 TABLET, ORALLY DISINTEGRATING ORAL EVERY 30 MIN PRN
Status: CANCELLED | OUTPATIENT
Start: 2023-11-16

## 2023-11-16 RX ORDER — NALOXONE HYDROCHLORIDE 0.4 MG/ML
0.4 INJECTION, SOLUTION INTRAMUSCULAR; INTRAVENOUS; SUBCUTANEOUS
Status: CANCELLED | OUTPATIENT
Start: 2023-11-16

## 2023-11-16 RX ORDER — FENTANYL CITRATE 50 UG/ML
25 INJECTION, SOLUTION INTRAMUSCULAR; INTRAVENOUS
Status: CANCELLED | OUTPATIENT
Start: 2023-11-16

## 2023-11-16 RX ORDER — LIDOCAINE 40 MG/G
CREAM TOPICAL
Status: DISCONTINUED | OUTPATIENT
Start: 2023-11-16 | End: 2023-11-16 | Stop reason: HOSPADM

## 2023-11-16 RX ORDER — LIDOCAINE HYDROCHLORIDE 10 MG/ML
INJECTION, SOLUTION INFILTRATION; PERINEURAL PRN
Status: DISCONTINUED | OUTPATIENT
Start: 2023-11-16 | End: 2023-11-16

## 2023-11-16 RX ORDER — ONDANSETRON 2 MG/ML
4 INJECTION INTRAMUSCULAR; INTRAVENOUS EVERY 6 HOURS PRN
Status: CANCELLED | OUTPATIENT
Start: 2023-11-16

## 2023-11-16 RX ORDER — ONDANSETRON 2 MG/ML
4 INJECTION INTRAMUSCULAR; INTRAVENOUS EVERY 30 MIN PRN
Status: CANCELLED | OUTPATIENT
Start: 2023-11-16

## 2023-11-16 RX ORDER — SODIUM CHLORIDE, SODIUM LACTATE, POTASSIUM CHLORIDE, CALCIUM CHLORIDE 600; 310; 30; 20 MG/100ML; MG/100ML; MG/100ML; MG/100ML
INJECTION, SOLUTION INTRAVENOUS CONTINUOUS
Status: DISCONTINUED | OUTPATIENT
Start: 2023-11-16 | End: 2023-11-16 | Stop reason: HOSPADM

## 2023-11-16 RX ORDER — PROCHLORPERAZINE MALEATE 5 MG
5 TABLET ORAL EVERY 6 HOURS PRN
Status: CANCELLED | OUTPATIENT
Start: 2023-11-16

## 2023-11-16 RX ADMIN — PROPOFOL 200 MCG/KG/MIN: 10 INJECTION, EMULSION INTRAVENOUS at 08:56

## 2023-11-16 RX ADMIN — LIDOCAINE HYDROCHLORIDE 3 ML: 10 INJECTION, SOLUTION INFILTRATION; PERINEURAL at 08:56

## 2023-11-16 RX ADMIN — PROPOFOL 40 MG: 10 INJECTION, EMULSION INTRAVENOUS at 08:59

## 2023-11-16 RX ADMIN — SODIUM CHLORIDE, POTASSIUM CHLORIDE, SODIUM LACTATE AND CALCIUM CHLORIDE: 600; 310; 30; 20 INJECTION, SOLUTION INTRAVENOUS at 08:54

## 2023-11-16 ASSESSMENT — ACTIVITIES OF DAILY LIVING (ADL): ADLS_ACUITY_SCORE: 35

## 2023-11-16 ASSESSMENT — COPD QUESTIONNAIRES: COPD: 1

## 2023-11-16 NOTE — ANESTHESIA POSTPROCEDURE EVALUATION
Patient: Walt Lambert    Procedure: Procedure(s):  COLONOSCOPY WITH POLYPECTOMY       Anesthesia Type:  MAC    Note:  Disposition: Outpatient   Postop Pain Control: Uneventful            Sign Out: Well controlled pain   PONV: No   Neuro/Psych: Uneventful            Sign Out: Acceptable/Baseline neuro status   Airway/Respiratory: Uneventful            Sign Out: Acceptable/Baseline resp. status   CV/Hemodynamics: Uneventful            Sign Out: Acceptable CV status; No obvious hypovolemia; No obvious fluid overload   Other NRE: NONE   DID A NON-ROUTINE EVENT OCCUR? No           Last vitals:  Vitals Value Taken Time   /77 11/16/23 0950   Temp 36.2  C (97.1  F) 11/16/23 0944   Pulse 62 11/16/23 0957   Resp 16 11/16/23 0944   SpO2 95 % 11/16/23 0957   Vitals shown include unfiled device data.    Electronically Signed By: Lopez Deleon MD  November 16, 2023  9:58 AM

## 2023-11-16 NOTE — ANESTHESIA CARE TRANSFER NOTE
Patient: Walt Lambert    Procedure: Procedure(s):  COLONOSCOPY WITH POLYPECTOMY       Diagnosis: Screening for colon cancer [Z12.11]  Diagnosis Additional Information: No value filed.    Anesthesia Type:   MAC     Note:    Oropharynx: oropharynx clear of all foreign objects and spontaneously breathing  Level of Consciousness: awake  Oxygen Supplementation: room air    Independent Airway: airway patency satisfactory and stable  Dentition: dentition unchanged  Vital Signs Stable: post-procedure vital signs reviewed and stable  Report to RN Given: handoff report given  Patient transferred to: Phase II    Handoff Report: Identifed the Patient, Identified the Reponsible Provider, Reviewed the pertinent medical history, Discussed the surgical course, Reviewed Intra-OP anesthesia mangement and issues during anesthesia, Set expectations for post-procedure period and Allowed opportunity for questions and acknowledgement of understanding      Vitals:  Vitals Value Taken Time   /77 11/16/23 0943   Temp 98.2    Pulse 66 11/16/23 0944   Resp 18    SpO2 97 % 11/16/23 0944   Vitals shown include unfiled device data.    Electronically Signed By: JORGE Martínez CRNA  November 16, 2023  9:45 AM

## 2023-11-16 NOTE — ANESTHESIA PREPROCEDURE EVALUATION
Anesthesia Pre-Procedure Evaluation    Patient: Walt Lambert   MRN: [id number] : 1950        Procedure : Procedure(s):  COLONOSCOPY          Past Medical History:   Diagnosis Date    Asthma     Chronic obstructive pulmonary disease (H) 2019    Diabetes mellitus (H)     insulin pump    Diabetic neuropathy (H)     GERD (gastroesophageal reflux disease)     Hyperlipidemia     Hypertension     Morbid obesity (H)     Sleep apnea     uses CPAP      Past Surgical History:   Procedure Laterality Date    COLONOSCOPY N/A 2017    Procedure: COLONOSCOPY;  Surgeon: Michael Fagan MD;  Location: [facility];  Service:     CV CORONARY ANGIOGRAM N/A 2020    Procedure: Coronary Angiogram;  Surgeon: Daniel Hayden MD;  Location: [facility];  Service: Cardiology    CV LEFT HEART CATHETERIZATION WITH LEFT VENTRICULOGRAM N/A 2020    Procedure: Left Heart Catheterization with Left Ventriculogram;  Surgeon: Daniel Hayden MD;  Location: [facility];  Service: Cardiology    GALLBLADDER SURGERY        No Known Allergies   Social History     Tobacco Use    Smoking status: Former    Smokeless tobacco: Never    Tobacco comments:     quit 40 years ago   Substance Use Topics    Alcohol use: Yes     Comment: Alcoholic Drinks/day: one beer every two weeks       Wt Readings from Last 1 Encounters:   23 (!) 172.4 kg (380 lb)        Anesthesia Evaluation   Pt has had prior anesthetic. Type: General.        ROS/MED HX  ENT/Pulmonary:     (+) sleep apnea, uses CPAP,                       COPD,              Neurologic:  - neg neurologic ROS     Cardiovascular:  - neg cardiovascular ROS   (+)  hypertension- -  CAD -  CABG- -                                      METS/Exercise Tolerance: >4 METS    Hematologic:  - neg hematologic  ROS     Musculoskeletal:  - neg musculoskeletal ROS     GI/Hepatic:     (+) GERD,       bowel prep,            Renal/Genitourinary:  - neg Renal ROS   (+) renal  "disease, type: CRI,            Endo:     (+)  type II DM, Last HgA1c: 7.3,   - using insulin pump.   Diabetic complications: nephropathy cardiac problems.      Obesity,       Psychiatric/Substance Use:  - neg psychiatric ROS     Infectious Disease:  - neg infectious disease ROS     Malignancy:  - neg malignancy ROS     Other:  - neg other ROS          Physical Exam    Airway        Mallampati: II   TM distance: > 3 FB   Neck ROM: full   Mouth opening: > 3 cm    Respiratory Devices and Support         Dental           Cardiovascular   cardiovascular exam normal       Rhythm and rate: regular and normal     Pulmonary   pulmonary exam normal        breath sounds clear to auscultation           OUTSIDE LABS:  CBC:   Lab Results   Component Value Date    WBC 9.0 10/10/2023    WBC 10.7 03/03/2020    HGB 15.1 10/10/2023    HGB 14.7 08/08/2022    HCT 45.0 10/10/2023    HCT 41.6 03/03/2020     10/10/2023     03/03/2020     BMP:   Lab Results   Component Value Date     10/10/2023     08/21/2023    POTASSIUM 4.1 10/10/2023    POTASSIUM 4.1 08/21/2023    CHLORIDE 99 10/10/2023    CHLORIDE 102 08/21/2023    CO2 26 10/10/2023    CO2 28 08/21/2023    BUN 18.0 10/10/2023    BUN 19.3 08/21/2023    CR 1.12 10/10/2023    CR 1.15 08/21/2023     (H) 11/16/2023     (H) 10/10/2023     COAGS:   Lab Results   Component Value Date    PTT 33 02/26/2020    INR 1.42 (H) 02/27/2020    FIBR 271 02/26/2020     POC: No results found for: \"BGM\", \"HCG\", \"HCGS\"  HEPATIC:   Lab Results   Component Value Date    ALBUMIN 4.1 08/21/2023    PROTTOTAL 6.6 08/21/2023    ALT 76 (H) 08/21/2023    AST 59 (H) 08/21/2023    ALKPHOS 131 (H) 08/21/2023    BILITOTAL 1.0 08/21/2023     OTHER:   Lab Results   Component Value Date    PH 7.45 02/29/2020    A1C 7.2 (H) 10/10/2023    VALERIA 9.1 10/10/2023    MAG 2.0 03/02/2020       Anesthesia Plan    ASA Status:  4    NPO Status:  NPO Appropriate    Anesthesia Type: MAC.     - Reason " for MAC: immobility needed              Consents    Anesthesia Plan(s) and associated risks, benefits, and realistic alternatives discussed. Questions answered and patient/representative(s) expressed understanding.     - Discussed:     - Discussed with:  Patient      - Extended Intubation/Ventilatory Support Discussed: No.      - Patient is DNR/DNI Status: No     Use of blood products discussed: No .     Postoperative Care       PONV prophylaxis: Ondansetron (or other 5HT-3), Dexamethasone or Solumedrol, Background Propofol Infusion     Comments:                Lopez Deleon MD

## 2023-11-16 NOTE — H&P
GENERAL PRE-PROCEDURE:   Procedure:  Colonoscopy  Date/Time:  11/16/2023 7:49 AM    Verbal consent obtained?: Yes    Written consent obtained?: Yes    Risks and benefits: Risks, benefits and alternatives were discussed    Consent given by:  Patient  Patient states understanding of procedure being performed: Yes    Patient's understanding of procedure matches consent: Yes    Procedure consent matches procedure scheduled: Yes    Expected level of sedation:  Deep  Appropriately NPO:  Yes  ASA Class:  3  Mallampati  :  Grade 2- soft palate, base of uvula, tonsillar pillars, and portion of posterior pharyngeal wall visible  Lungs:  Lungs clear with good breath sounds bilaterally  Heart:  Normal heart sounds and rate and systolic murmur  History & Physical reviewed:  History and physical reviewed and no updates needed  Statement of review:  I have reviewed the lab findings, diagnostic data, medications, and the plan for sedation

## 2023-11-17 LAB
PATH REPORT.COMMENTS IMP SPEC: NORMAL
PATH REPORT.COMMENTS IMP SPEC: NORMAL
PATH REPORT.FINAL DX SPEC: NORMAL
PATH REPORT.GROSS SPEC: NORMAL
PATH REPORT.MICROSCOPIC SPEC OTHER STN: NORMAL
PATH REPORT.RELEVANT HX SPEC: NORMAL
PHOTO IMAGE: NORMAL

## 2023-11-17 PROCEDURE — 88305 TISSUE EXAM BY PATHOLOGIST: CPT | Mod: 26 | Performed by: PATHOLOGY

## 2023-11-21 ENCOUNTER — LAB (OUTPATIENT)
Dept: LAB | Facility: CLINIC | Age: 73
End: 2023-11-21
Payer: COMMERCIAL

## 2023-11-21 DIAGNOSIS — Z79.4 TYPE 2 DIABETES MELLITUS WITH OTHER CIRCULATORY COMPLICATION, WITH LONG-TERM CURRENT USE OF INSULIN (H): ICD-10-CM

## 2023-11-21 DIAGNOSIS — E11.59 TYPE 2 DIABETES MELLITUS WITH OTHER CIRCULATORY COMPLICATION, WITH LONG-TERM CURRENT USE OF INSULIN (H): ICD-10-CM

## 2023-11-21 LAB
ANION GAP SERPL CALCULATED.3IONS-SCNC: 13 MMOL/L (ref 7–15)
BUN SERPL-MCNC: 20.2 MG/DL (ref 8–23)
CALCIUM SERPL-MCNC: 9.5 MG/DL (ref 8.8–10.2)
CHLORIDE SERPL-SCNC: 104 MMOL/L (ref 98–107)
CREAT SERPL-MCNC: 1.32 MG/DL (ref 0.67–1.17)
CREAT UR-MCNC: 18.4 MG/DL
DEPRECATED HCO3 PLAS-SCNC: 28 MMOL/L (ref 22–29)
EGFRCR SERPLBLD CKD-EPI 2021: 58 ML/MIN/1.73M2
GLUCOSE SERPL-MCNC: 96 MG/DL (ref 70–99)
HBA1C MFR BLD: 6.7 % (ref 0–5.6)
LDLC SERPL DIRECT ASSAY-MCNC: 54 MG/DL
MICROALBUMIN UR-MCNC: 49.1 MG/L
MICROALBUMIN/CREAT UR: 266.85 MG/G CR (ref 0–17)
POTASSIUM SERPL-SCNC: 4.7 MMOL/L (ref 3.4–5.3)
SODIUM SERPL-SCNC: 145 MMOL/L (ref 135–145)

## 2023-11-21 PROCEDURE — 36415 COLL VENOUS BLD VENIPUNCTURE: CPT

## 2023-11-21 PROCEDURE — 82043 UR ALBUMIN QUANTITATIVE: CPT

## 2023-11-21 PROCEDURE — 83036 HEMOGLOBIN GLYCOSYLATED A1C: CPT

## 2023-11-21 PROCEDURE — 82570 ASSAY OF URINE CREATININE: CPT

## 2023-11-21 PROCEDURE — 83721 ASSAY OF BLOOD LIPOPROTEIN: CPT

## 2023-11-21 PROCEDURE — 80048 BASIC METABOLIC PNL TOTAL CA: CPT

## 2023-11-28 ENCOUNTER — OFFICE VISIT (OUTPATIENT)
Dept: OPHTHALMOLOGY | Facility: CLINIC | Age: 73
End: 2023-11-28
Attending: OPTOMETRIST
Payer: COMMERCIAL

## 2023-11-28 DIAGNOSIS — H53.10 SUBJECTIVE VISUAL DISTURBANCE: Primary | ICD-10-CM

## 2023-11-28 DIAGNOSIS — H50.52 EXOPHORIA: Primary | ICD-10-CM

## 2023-11-28 DIAGNOSIS — H50.34 INTERMITTENT ALTERNATING EXOTROPIA: ICD-10-CM

## 2023-11-28 DIAGNOSIS — H25.13 CATARACT, NUCLEAR SCLEROTIC SENILE, BILATERAL: ICD-10-CM

## 2023-11-28 DIAGNOSIS — E11.39: ICD-10-CM

## 2023-11-28 PROCEDURE — 92060 SENSORIMOTOR EXAMINATION: CPT | Mod: 26 | Performed by: OPHTHALMOLOGY

## 2023-11-28 PROCEDURE — 99204 OFFICE O/P NEW MOD 45 MIN: CPT | Mod: GC | Performed by: OPHTHALMOLOGY

## 2023-11-28 PROCEDURE — G0463 HOSPITAL OUTPT CLINIC VISIT: HCPCS | Performed by: OPHTHALMOLOGY

## 2023-11-28 PROCEDURE — 92060 SENSORIMOTOR EXAMINATION: CPT | Performed by: OPHTHALMOLOGY

## 2023-11-28 ASSESSMENT — TONOMETRY
IOP_METHOD: ICARE
OS_IOP_MMHG: 14
OD_IOP_MMHG: 14

## 2023-11-28 ASSESSMENT — SLIT LAMP EXAM - LIDS
COMMENTS: NORMAL
COMMENTS: NORMAL

## 2023-11-28 ASSESSMENT — VISUAL ACUITY
OS_SC+: -1
OD_PH_SC+: -1
OD_PH_SC: 20/20
METHOD: SNELLEN - LINEAR
OD_SC: 20/40
OS_SC: 20/25
OD_SC+: +1

## 2023-11-28 ASSESSMENT — CUP TO DISC RATIO
OS_RATIO: 0.1
OD_RATIO: 0.1

## 2023-11-28 ASSESSMENT — CONF VISUAL FIELD
OD_INFERIOR_NASAL_RESTRICTION: 0
OD_NORMAL: 1
OD_SUPERIOR_TEMPORAL_RESTRICTION: 0
OS_INFERIOR_TEMPORAL_RESTRICTION: 0
OS_NORMAL: 1
METHOD: COUNTING FINGERS
OD_INFERIOR_TEMPORAL_RESTRICTION: 0
OD_SUPERIOR_NASAL_RESTRICTION: 0
OS_SUPERIOR_TEMPORAL_RESTRICTION: 0
OS_SUPERIOR_NASAL_RESTRICTION: 0
OS_INFERIOR_NASAL_RESTRICTION: 0

## 2023-11-28 ASSESSMENT — EXTERNAL EXAM - RIGHT EYE: OD_EXAM: NORMAL

## 2023-11-28 ASSESSMENT — EXTERNAL EXAM - LEFT EYE: OS_EXAM: NORMAL

## 2023-11-28 NOTE — PROGRESS NOTES
Walt Lambert is a 72 year old male with the following diagnoses:   1. Exophoria    2. Cataract, nuclear sclerotic senile, bilateral    3. Intermittent alternating exotropia    4. Type 2 diabetes mellitus, controlled, with ophthalmic manifestations (H)         Patient was sent for consultation by Dr. Klein for double vision and blurry vision     HPI:    A month ago patient noticed vertical diplopia and having trouble reading due to eye feeling tired and tearing. Diplopia was always vertical never horizontal. He mostly noticed it in distance not so much at near. The double vision also gets worse later in the day. Patient never tried to close either eye to see if diplopia improved. Patient states that he rarely sees double now but complains mostly of blurry vision. Patient states that his vision is always blurry and gets worse if he does a lot of reading. Patient states that his right eye is the one that mostly blurry. Patient currently only wears reading glasses he doesn't wear anything for distance correction. Denies headaches, jaw claudication, night sweats/fevers, new shoulder or girdle pain, eyelid drooping. Denies any recent neuro-imaging or lab work up.       Independent historians:  Patient    Review of outside testing:    None obtained     My interpretation performed today of outside testing:  N/A         Review of outside clinical notes:    11/10/23 -- Visit with Dr. Klein        Past medical history:    Patient Active Problem List   Diagnosis    Diabetes mellitus (H)    Morbid obesity (H)    Cardiomyopathy (H)    Chronic obstructive pulmonary disease (H)    Hyperlipidemia    Hypertension    Sleep apnea    Insulin pump status    MAHMOOD (dyspnea on exertion)    Abnormal cardiac CT angiography    CAD (coronary artery disease)    S/P CABG (coronary artery bypass graft)    ARF (acute respiratory failure) (H)    Anemia due to blood loss, acute    Arteriosclerosis of coronary artery bypass graft     Body mass index (BMI) 50.0-59.9, adult    Pure hypertriglyceridemia    Peripheral venous insufficiency    Lymphedema    Chronic kidney disease, stage 1    Hammertoe, bilateral    Onychomycosis    Anhidrosis         Medications:   Advair Diskus Aepb  amLODIPine  aspirin  atorvastatin  bumetanide  FreeStyle Ivelisse 2 Sensor Misc  Hello Heart Blood Pressure Kit  insulin lispro  lisinopril  metoprolol tartrate  Multi Vitamin Tabs  omeprazole  simvastatin  triamcinolone  Trulicity Sopn      Family history / social history:  Patient's family history includes Colon Cancer in his sister; Heart Failure in his mother; Leukemia in his father and mother; Snoring in his father and mother.     Patient  reports that he has quit smoking. He has never used smokeless tobacco. He reports current alcohol use. He reports that he does not use drugs.       Exam:  VA ph 20/20 OD, 20/20 OS. Pupils no rAPD. Anterior segment wnl for age. Posterior segment wnl for age. Strabismus small exophoria at distance and near but no subjective diplopia on alternate cover testing today. No fatigable ptosis but possible mild orbicularis weakness       Tests ordered and interpreted today:  Sensorimotor exam        Discussion of management / interpretation with another provider:   None    Assessment/Plan:   It is my impression that patient experienced double vision for about a month.  He does not have any double vision at this time or in the office.  Alternate cover testing shows a mild exophoria.  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 This also could be the first presenting sign of myasthenia although no ptosis or fatigable ptosis but does have mild orbicularis weakness on exam today.  I suggested that he could have an acetylcholine  receptor antibody test today but he would prefer to wait.  This is not unreasonable.  I have asked him to contact me should his double vision return.          Attending Physician Attestation:  Complete documentation of historical and exam elements from today's encounter can be found in the full encounter summary report (not reduplicated in this progress note).  I personally obtained the chief complaint(s) and history of present illness.  I confirmed and edited as necessary the review of systems, past medical/surgical history, family history, social history, and examination findings as documented by others; and I examined the patient myself.  I personally reviewed the relevant tests, images, and reports as documented above.  I formulated and edited as necessary the assessment and plan and discussed the findings and management plan with the patient and family. I personally reviewed the ophthalmic test(s) associated with this encounter, agree with the interpretation(s) as documented by the resident/fellow, and have edited the corresponding report(s) as necessary.  - Gurwinder Camp MD   Fellow, Neuro-Ophthalmology

## 2023-11-28 NOTE — NURSING NOTE
Chief Complaint(s) and History of Present Illness(es)       Diplopia Evaluation    In both eyes.  Disease is new.  Characterized as horizontal.  Duration of 1 month.  Occurring intermittently.  Occurring in the evening.  Associated symptoms include eye pain and blurred vision.  Negative for headaches. Additional comments: Walt Lambert is a 72 year old male sent for consultation by Dr. Klein for diplopia evaluation.  Alvino reports vertical double vision onset 1 month ago.  Double vision more present in the evenings, feels like eyes are out of focus.  Patient endorses blurry vision and mild eye pain.  Patient is DM 2, BSL in exam rm is 121.  Last A1C 6.7 a weeks ago.  Says he has pre-cataracts.  PIPPA Gonzalez 11/28/2023 7:52 AM

## 2023-11-28 NOTE — LETTER
2023         RE:  :  MRN: Walt Lambert  1950  [id number]     Dear Dr. Klein,    Thank you for asking me to see your very pleasant patient, Walt Lambert, in neuro-ophthalmic consultation.  I would like to thank you for sending your records and I have summarized them in the history of present illness.  My assessment and plan are below.  For further details, please see my attached clinic note.      Walt Lambert is a 72 year old male with the following diagnoses:   1. Exophoria    2. Cataract, nuclear sclerotic senile, bilateral    3. Intermittent alternating exotropia    4. Type 2 diabetes mellitus, controlled, with ophthalmic manifestations (H)       Patient was sent for consultation by Dr. Klein for double vision and blurry vision     HPI: A month ago patient noticed vertical diplopia and having trouble reading due to eye feeling tired and tearing. Diplopia was always vertical never horizontal. He mostly noticed it in distance not so much at near. The double vision also gets worse later in the day. Patient never tried to close either eye to see if diplopia improved. Patient states that he rarely sees double now but complains mostly of blurry vision. Patient states that his vision is always blurry and gets worse if he does a lot of reading. Patient states that his right eye is the one that mostly blurry. Patient currently only wears reading glasses he doesn't wear anything for distance correction. Denies headaches, jaw claudication, night sweats/fevers, new shoulder or girdle pain, eyelid drooping. Denies any recent neuro-imaging or lab work up.     Independent historians: Patient    Review of outside testing: None obtained     My interpretation performed today of outside testing: N/A     Review of outside clinical notes:  11/10/23 -- Visit with Dr. Klein    Past medical history:  Patient Active Problem List   Diagnosis    Diabetes mellitus (H)    Morbid obesity (H)     Cardiomyopathy (H)    Chronic obstructive pulmonary disease (H)    Hyperlipidemia    Hypertension    Sleep apnea    Insulin pump status    MAHMOOD (dyspnea on exertion)    Abnormal cardiac CT angiography    CAD (coronary artery disease)    S/P CABG (coronary artery bypass graft)    ARF (acute respiratory failure) (H)    Anemia due to blood loss, acute    Arteriosclerosis of coronary artery bypass graft    Body mass index (BMI) 50.0-59.9, adult    Pure hypertriglyceridemia    Peripheral venous insufficiency    Lymphedema    Chronic kidney disease, stage 1    Hammertoe, bilateral    Onychomycosis    Anhidrosis     Medications:   Advair Diskus Aepb  amLODIPine  aspirin  atorvastatin  bumetanide  FreeStyle Ivelisse 2 Sensor Misc  Hello Heart Blood Pressure Kit  insulin lispro  lisinopril  metoprolol tartrate  Multi Vitamin Tabs  omeprazole  simvastatin  triamcinolone  Trulicity Sopn    Family history / social history:  Patient's family history includes Colon Cancer in his sister; Heart Failure in his mother; Leukemia in his father and mother; Snoring in his father and mother.     Patient  reports that he has quit smoking. He has never used smokeless tobacco. He reports current alcohol use. He reports that he does not use drugs.     Exam: VA ph 20/20 OD, 20/20 OS. Pupils no rAPD. Anterior segment wnl for age. Posterior segment wnl for age. Strabismus small exophoria at distance and near but no subjective diplopia on alternate cover testing today. No fatigable ptosis but possible mild orbicularis weakness     Tests ordered and interpreted today: Sensorimotor exam    Discussion of management / interpretation with another provider: None    Assessment/Plan: It is my impression that patient experienced double vision for about a month.  He does not have any double vision at this time or in the office.  Alternate cover testing shows a mild exophoria.  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 This also could be the first presenting sign of myasthenia although no ptosis or fatigable ptosis but does have mild orbicularis weakness on exam today.  I suggested that he could have an acetylcholine receptor antibody test today but he would prefer to wait.  This is not unreasonable.  I have asked him to contact me should his double vision return.    Again, thank you for allowing me to participate in the care of your patient.      Sincerely,    Gurwinder Cortez MD  Professor  Ophthalmology Residency   Director of Neuro-Ophthalmology  [employer] [facility] [employer] Endowed Chair  Departments of Ophthalmology, Neurology, and Neurosurgery  [facility] [address] [place] [address]  [id number]  T - [phone]  F - [phone]  [email or web address]      CC: Ange Klein OD  [facility]  [address] [place] [id number]  Via Fax: [phone]

## 2023-11-29 NOTE — PROGRESS NOTES
"[facility] ENDOCRINOLOGY    Diabetes Note 11/30/2023    Walt Lambert, 1950, [id number]          Reason for visit      1. Type 2 diabetes mellitus with other circulatory complication, with long-term current use of insulin (H)        HPI     Walt Lambert is a very pleasant 72 year old old male who presents for follow up.  SUMMARY:    Alvino returns in follow-up for DM 2. His current A1c is 6.7 and has improved markedly from where he was in February. He retired about a year ago, and had \"had more time\" to devote to self care.     He continues to use the T-slim insulin pump and the Ivelisse 2 CGM for his management. Both were downloaded today and data was reviewed for appointment.     TIR was 85% with high 15%. No hypoglycemia was noted. He does all of his pumping manually. Lowest recorded BG was 110, highest was 251. He does note a 61 that happened within the last two days. He is completely blunted and does not feel his lows at all.     He reports that his feet are quite numb, but that \"they don't bother him any more\". He does have what he calls \"restless feet\" at night, but no pain.      LDL is currently 54 and he is on a Statin. Renal function slightly elevated (Creatinine of 1.32 and GFR of 58) we both suspect that he was a little dry that day. He states that he does try and drink \"a lot of water\". He is on an ACE. He continues with Microalbuminuria, and this is stable.     Blood glucose data:    Insulin Pump Settings     Basal Rate  TIME Units/HR   0000 - 0000 10.0                         Bolus: Insulin : CHO ratio  Time Units Grams CHO   0000 - 0000 1 15                          Correction  #Units Blood glucose >Target BG   1 5 120         Past Medical History     Patient Active Problem List   Diagnosis    Diabetes mellitus (H)    Morbid obesity (H)    Cardiomyopathy (H)    Chronic obstructive pulmonary disease (H)    Hyperlipidemia    Hypertension    Sleep apnea    Insulin pump status    MAHMOOD " (dyspnea on exertion)    Abnormal cardiac CT angiography    CAD (coronary artery disease)    S/P CABG (coronary artery bypass graft)    ARF (acute respiratory failure) (H)    Anemia due to blood loss, acute    Arteriosclerosis of coronary artery bypass graft    Body mass index (BMI) 50.0-59.9, adult    Pure hypertriglyceridemia    Peripheral venous insufficiency    Lymphedema    Chronic kidney disease, stage 1    Hammertoe, bilateral    Onychomycosis    Anhidrosis        Family History       family history includes Colon Cancer in his sister; Heart Failure in his mother; Leukemia in his father and mother; Snoring in his father and mother.    Social History      reports that he has quit smoking. He has never used smokeless tobacco. He reports current alcohol use. He reports that he does not use drugs.      Review of Systems     Patient has no polyuria or polydipsia, no chest pain, dyspnea or TIA's, no numbness, tingling or pain in extremities  Remainder negative except as noted in HPI.    Vital Signs     /73 (BP Location: Left arm, Patient Position: Sitting, Cuff Size: Adult Large)   Pulse 67   Wt (!) 172.8 kg (381 lb)   SpO2 95%   BMI 54.67 kg/m    Wt Readings from Last 3 Encounters:   11/30/23 (!) 172.8 kg (381 lb)   11/16/23 (!) 172.4 kg (380 lb)   09/29/23 (!) 171.8 kg (378 lb 12.8 oz)       Physical Exam     Constitutional:  Well developed, Well nourished. obese  HENT:  Normocephalic,   Neck: Thyroid normal, No lymph nodes, Supple  Eyes:  PERRL, Conjunctiva pink  Respiratory:  Normal breath sounds, No respiratory distress  Cardiovascular:  Normal heart rate, Normal rhythm, No murmurs  GI:  Bowel sounds normal, Soft, No tenderness  Musculoskeletal:  No gross deformity or lesions, normal dorsalis pedis pulses  Skin: No acanthosis nigricans, lipoatrophy or lipodystrophy  Neurologic:  Alert & oriented x 3, nonfocal  Psychiatric:  Affect, Mood, Insight appropriate      Assessment     1. Type 2 diabetes  mellitus with other circulatory complication, with long-term current use of insulin (H)        Plan     Alvino's control has improved. No changes to his settings warranted today. I will see him back in 3 months per Medicare guidelines for pumping.         Emily Alfred NP  [facility]  11/30/2023  8:01 AM      Lab Results     Microalbumin Urine mg/dL   Date Value Ref Range Status   10/02/2020 17.87 (H) 0.00 - 1.99 mg/dL Final       Cholesterol   Date Value Ref Range Status   08/21/2023 118 <200 mg/dL Final     Direct Measure HDL   Date Value Ref Range Status   08/21/2023 38 (L) >=40 mg/dL Final     Triglycerides   Date Value Ref Range Status   08/21/2023 188 (H) <150 mg/dL Final       [unfilled]      Current Medications     Outpatient Medications Prior to Visit   Medication Sig Dispense Refill    ADVAIR DISKUS 250-50 mcg/dose DISKUS [ADVAIR DISKUS 250-50 MCG/DOSE DISKUS] Inhale 1 puff 2 (two) times a day.      albuterol (PROAIR HFA/PROVENTIL HFA/VENTOLIN HFA) 108 (90 Base) MCG/ACT inhaler INHALE 1 PUFF INTO THE LUNGS EVERY 4 HOURS AS NEEDED      amLODIPine (NORVASC) 10 MG tablet Take 10 mg by mouth daily      aspirin 81 MG EC tablet [ASPIRIN 81 MG EC TABLET] Take 81 mg by mouth daily.      atorvastatin (LIPITOR) 80 MG tablet Take 1 tablet (80 mg) by mouth daily 90 tablet 3    bumetanide (BUMEX) 2 MG tablet Take 1 tablet (2 mg) by mouth daily 90 tablet 1    Digital Therapy (HELLO HEART BLOOD PRESSURE) KIT       Dulaglutide (TRULICITY) 3 MG/0.5ML SOPN Inject 3 mg Subcutaneous once a week 6 mL 3    furosemide (LASIX) 40 MG tablet       insulin lispro (HUMALOG) 100 UNIT/ML vial INJECT SUBCUTANEOUSLY 400 UNITS  DAILY VIA INSULIN PUMP 120 mL 0    lisinopril (ZESTRIL) 20 MG tablet Take 1 tablet (20 mg) by mouth 2 times daily 180 tablet 3    metoprolol tartrate (LOPRESSOR) 100 MG tablet Take 1 tablet (100 mg) by mouth 2 times daily 180 tablet 3    multivitamin (ONE A DAY) per tablet [MULTIVITAMIN (ONE A DAY) PER TABLET]  Take 1 tablet by mouth daily.      omeprazole (PRILOSEC) 20 MG capsule [OMEPRAZOLE (PRILOSEC) 20 MG CAPSULE] Take 20 mg by mouth daily.      predniSONE (DELTASONE) 20 MG tablet PLEASE SEE ATTACHED FOR DETAILED DIRECTIONS      simvastatin (ZOCOR) 20 MG tablet Take 20 mg by mouth      triamcinolone (KENALOG) 0.025 % cream [TRIAMCINOLONE (KENALOG) 0.025 % CREAM] Apply 1 application topically 3 (three) times a day as needed.       Continuous Blood Gluc Sensor (FREESTYLE ANA M 2 SENSOR) MISC CHANGE EVERY 14 DAYS 2 each 5     No facility-administered medications prior to visit.

## 2023-11-30 ENCOUNTER — OFFICE VISIT (OUTPATIENT)
Dept: ENDOCRINOLOGY | Facility: CLINIC | Age: 73
End: 2023-11-30
Payer: COMMERCIAL

## 2023-11-30 VITALS
SYSTOLIC BLOOD PRESSURE: 117 MMHG | OXYGEN SATURATION: 95 % | WEIGHT: 315 LBS | HEART RATE: 67 BPM | BODY MASS INDEX: 54.67 KG/M2 | DIASTOLIC BLOOD PRESSURE: 73 MMHG

## 2023-11-30 DIAGNOSIS — E11.59 TYPE 2 DIABETES MELLITUS WITH OTHER CIRCULATORY COMPLICATION, WITH LONG-TERM CURRENT USE OF INSULIN (H): Primary | ICD-10-CM

## 2023-11-30 DIAGNOSIS — Z79.4 TYPE 2 DIABETES MELLITUS WITH OTHER CIRCULATORY COMPLICATION, WITH LONG-TERM CURRENT USE OF INSULIN (H): Primary | ICD-10-CM

## 2023-11-30 PROCEDURE — 99214 OFFICE O/P EST MOD 30 MIN: CPT | Performed by: NURSE PRACTITIONER

## 2023-11-30 RX ORDER — FUROSEMIDE 40 MG
TABLET ORAL
COMMUNITY
Start: 2023-03-06 | End: 2024-03-01

## 2023-11-30 RX ORDER — PREDNISONE 20 MG/1
TABLET ORAL
COMMUNITY
Start: 2023-05-18 | End: 2024-03-01

## 2023-11-30 RX ORDER — ALBUTEROL SULFATE 90 UG/1
AEROSOL, METERED RESPIRATORY (INHALATION)
COMMUNITY
Start: 2023-05-18

## 2023-11-30 RX ORDER — BLOOD-GLUCOSE SENSOR
1 EACH MISCELLANEOUS
Qty: 6 EACH | Refills: 3 | Status: SHIPPED | OUTPATIENT
Start: 2023-11-30

## 2023-11-30 NOTE — LETTER
"    11/30/2023         RE: Walt Lambert  [address] [place] [id number]        Dear Colleague,    Thank you for referring your patient, Walt Lambert, to the [facility] [place]. Please see a copy of my visit note below.    [facility]    Diabetes Note 11/30/2023    Walt Lambert, 1950, [id number]          Reason for visit      1. Type 2 diabetes mellitus with other circulatory complication, with long-term current use of insulin (H)        HPI     Walt Lambert is a very pleasant 72 year old old male who presents for follow up.  SUMMARY:    Alvino returns in follow-up for DM 2. His current A1c is 6.7 and has improved markedly from where he was in February. He retired about a year ago, and had \"had more time\" to devote to self care.     He continues to use the T-slim insulin pump and the Ivelisse 2 CGM for his management. Both were downloaded today and data was reviewed for appointment.     TIR was 85% with high 15%. No hypoglycemia was noted. He does all of his pumping manually. Lowest recorded BG was 110, highest was 251. He does note a 61 that happened within the last two days. He is completely blunted and does not feel his lows at all.     He reports that his feet are quite numb, but that \"they don't bother him any more\". He does have what he calls \"restless feet\" at night, but no pain.      LDL is currently 54 and he is on a Statin. Renal function slightly elevated (Creatinine of 1.32 and GFR of 58) we both suspect that he was a little dry that day. He states that he does try and drink \"a lot of water\". He is on an ACE. He continues with Microalbuminuria, and this is stable.     Blood glucose data:    Insulin Pump Settings     Basal Rate  TIME Units/HR   0000 - 0000 10.0                         Bolus: Insulin : CHO ratio  Time Units Grams CHO   0000 - 0000 1 15                          Correction  #Units Blood glucose >Target BG   1 5 120 "         Past Medical History     Patient Active Problem List   Diagnosis     Diabetes mellitus (H)     Morbid obesity (H)     Cardiomyopathy (H)     Chronic obstructive pulmonary disease (H)     Hyperlipidemia     Hypertension     Sleep apnea     Insulin pump status     MAHMOOD (dyspnea on exertion)     Abnormal cardiac CT angiography     CAD (coronary artery disease)     S/P CABG (coronary artery bypass graft)     ARF (acute respiratory failure) (H)     Anemia due to blood loss, acute     Arteriosclerosis of coronary artery bypass graft     Body mass index (BMI) 50.0-59.9, adult     Pure hypertriglyceridemia     Peripheral venous insufficiency     Lymphedema     Chronic kidney disease, stage 1     Hammertoe, bilateral     Onychomycosis     Anhidrosis        Family History       family history includes Colon Cancer in his sister; Heart Failure in his mother; Leukemia in his father and mother; Snoring in his father and mother.    Social History      reports that he has quit smoking. He has never used smokeless tobacco. He reports current alcohol use. He reports that he does not use drugs.      Review of Systems     Patient has no polyuria or polydipsia, no chest pain, dyspnea or TIA's, no numbness, tingling or pain in extremities  Remainder negative except as noted in HPI.    Vital Signs     /73 (BP Location: Left arm, Patient Position: Sitting, Cuff Size: Adult Large)   Pulse 67   Wt (!) 172.8 kg (381 lb)   SpO2 95%   BMI 54.67 kg/m    Wt Readings from Last 3 Encounters:   11/30/23 (!) 172.8 kg (381 lb)   11/16/23 (!) 172.4 kg (380 lb)   09/29/23 (!) 171.8 kg (378 lb 12.8 oz)       Physical Exam     Constitutional:  Well developed, Well nourished. obese  HENT:  Normocephalic,   Neck: Thyroid normal, No lymph nodes, Supple  Eyes:  PERRL, Conjunctiva pink  Respiratory:  Normal breath sounds, No respiratory distress  Cardiovascular:  Normal heart rate, Normal rhythm, No murmurs  GI:  Bowel sounds normal, Soft, No  tenderness  Musculoskeletal:  No gross deformity or lesions, normal dorsalis pedis pulses  Skin: No acanthosis nigricans, lipoatrophy or lipodystrophy  Neurologic:  Alert & oriented x 3, nonfocal  Psychiatric:  Affect, Mood, Insight appropriate      Assessment     1. Type 2 diabetes mellitus with other circulatory complication, with long-term current use of insulin (H)        Plan     Alvino's control has improved. No changes to his settings warranted today. I will see him back in 3 months per Medicare guidelines for pumping.         Emily Alfred NP  [facility]  11/30/2023  8:01 AM      Lab Results     Microalbumin Urine mg/dL   Date Value Ref Range Status   10/02/2020 17.87 (H) 0.00 - 1.99 mg/dL Final       Cholesterol   Date Value Ref Range Status   08/21/2023 118 <200 mg/dL Final     Direct Measure HDL   Date Value Ref Range Status   08/21/2023 38 (L) >=40 mg/dL Final     Triglycerides   Date Value Ref Range Status   08/21/2023 188 (H) <150 mg/dL Final       [unfilled]      Current Medications     Outpatient Medications Prior to Visit   Medication Sig Dispense Refill     ADVAIR DISKUS 250-50 mcg/dose DISKUS [ADVAIR DISKUS 250-50 MCG/DOSE DISKUS] Inhale 1 puff 2 (two) times a day.       albuterol (PROAIR HFA/PROVENTIL HFA/VENTOLIN HFA) 108 (90 Base) MCG/ACT inhaler INHALE 1 PUFF INTO THE LUNGS EVERY 4 HOURS AS NEEDED       amLODIPine (NORVASC) 10 MG tablet Take 10 mg by mouth daily       aspirin 81 MG EC tablet [ASPIRIN 81 MG EC TABLET] Take 81 mg by mouth daily.       atorvastatin (LIPITOR) 80 MG tablet Take 1 tablet (80 mg) by mouth daily 90 tablet 3     bumetanide (BUMEX) 2 MG tablet Take 1 tablet (2 mg) by mouth daily 90 tablet 1     Digital Therapy (HELLO HEART BLOOD PRESSURE) KIT        Dulaglutide (TRULICITY) 3 MG/0.5ML SOPN Inject 3 mg Subcutaneous once a week 6 mL 3     furosemide (LASIX) 40 MG tablet        insulin lispro (HUMALOG) 100 UNIT/ML vial INJECT SUBCUTANEOUSLY 400 UNITS  DAILY VIA INSULIN  PUMP 120 mL 0     lisinopril (ZESTRIL) 20 MG tablet Take 1 tablet (20 mg) by mouth 2 times daily 180 tablet 3     metoprolol tartrate (LOPRESSOR) 100 MG tablet Take 1 tablet (100 mg) by mouth 2 times daily 180 tablet 3     multivitamin (ONE A DAY) per tablet [MULTIVITAMIN (ONE A DAY) PER TABLET] Take 1 tablet by mouth daily.       omeprazole (PRILOSEC) 20 MG capsule [OMEPRAZOLE (PRILOSEC) 20 MG CAPSULE] Take 20 mg by mouth daily.       predniSONE (DELTASONE) 20 MG tablet PLEASE SEE ATTACHED FOR DETAILED DIRECTIONS       simvastatin (ZOCOR) 20 MG tablet Take 20 mg by mouth       triamcinolone (KENALOG) 0.025 % cream [TRIAMCINOLONE (KENALOG) 0.025 % CREAM] Apply 1 application topically 3 (three) times a day as needed.        Continuous Blood Gluc Sensor (FREESTYLE ANA M 2 SENSOR) MISC CHANGE EVERY 14 DAYS 2 each 5     No facility-administered medications prior to visit.                         Again, thank you for allowing me to participate in the care of your patient.        Sincerely,        Emily Alfred NP

## 2023-12-01 DIAGNOSIS — E11.59 TYPE 2 DIABETES MELLITUS WITH OTHER CIRCULATORY COMPLICATION, WITH LONG-TERM CURRENT USE OF INSULIN (H): ICD-10-CM

## 2023-12-01 DIAGNOSIS — Z79.4 TYPE 2 DIABETES MELLITUS WITH OTHER CIRCULATORY COMPLICATION, WITH LONG-TERM CURRENT USE OF INSULIN (H): ICD-10-CM

## 2023-12-01 RX ORDER — DULAGLUTIDE 3 MG/.5ML
INJECTION, SOLUTION SUBCUTANEOUS
Qty: 6 ML | Refills: 3 | OUTPATIENT
Start: 2023-12-01

## 2023-12-01 RX ORDER — INSULIN LISPRO 100 [IU]/ML
INJECTION, SOLUTION INTRAVENOUS; SUBCUTANEOUS
Qty: 120 ML | Refills: 2 | Status: SHIPPED | OUTPATIENT
Start: 2023-12-01 | End: 2024-04-15

## 2023-12-01 NOTE — TELEPHONE ENCOUNTER
"      Requested Prescriptions   Pending Prescriptions Disp Refills    insulin lispro (HUMALOG) 100 UNIT/ML vial [Pharmacy Med Name: HumaLOG 100 UNIT/ML Subcutaneous Solution] 120 mL 11     Sig: INJECT SUBCUTANEOUSLY 400 UNITS  DAILY VIA INSULIN PUMP       Short Acting Insulin Protocol Failed - 12/1/2023  4:30 AM        Failed - Recent (6 mo) or future (30 days) visit within the authorizing provider's specialty     Patient had office visit in the last 6 months or has a visit in the next 30 days with authorizing provider or within the authorizing provider's specialty.  See \"Patient Info\" tab in inbasket, or \"Choose Columns\" in Meds & Orders section of the refill encounter.            Passed - Serum creatinine on file in past 12 months     Recent Labs   Lab Test 11/21/23  0800   CR 1.32*       Ok to refill medication if creatinine is low          Passed - HgbA1C in past 3 or 6 months     If HgbA1C is 8 or greater, it needs to be on file within the past 3 months.  If less than 8, must be on file within the past 6 months.     Recent Labs   Lab Test 11/21/23  0800   A1C 6.7*             Passed - Medication is active on med list        Passed - Patient is age 18 or older          TRULICITY 3 MG/0.5ML SOPN [Pharmacy Med Name: Trulicity 3 MG/0.5ML Subcutaneous Solution Pen-injector] 6 mL 3     Sig: INJECT THE CONTENTS OF ONE PEN  SUBCUTANEOUSLY WEEKLY AS  DIRECTED       GLP-1 Agonists Protocol Failed - 12/1/2023  4:30 AM        Failed - Normal serum creatinine on file in past 12 months     Recent Labs   Lab Test 11/21/23  0800   CR 1.32*       Ok to refill medication if creatinine is low          Failed - Recent (6 mo) or future (30 days) visit within the authorizing provider's specialty     Patient had office visit in the last 6 months or has a visit in the next 30 days with authorizing provider.  See \"Patient Info\" tab in inbasket, or \"Choose Columns\" in Meds & Orders section of the refill encounter.            Passed - " HgbA1C in past 3 or 6 months     If HgbA1C is 8 or greater, it needs to be on file within the past 3 months.  If less than 8, must be on file within the past 6 months.     Recent Labs   Lab Test 11/21/23  0800   A1C 6.7*             Passed - Medication is active on med list        Passed - Patient is age 18 or older

## 2024-01-02 DIAGNOSIS — R06.09 DOE (DYSPNEA ON EXERTION): ICD-10-CM

## 2024-01-02 RX ORDER — BUMETANIDE 2 MG/1
2 TABLET ORAL DAILY
Qty: 100 TABLET | Refills: 2 | Status: SHIPPED | OUTPATIENT
Start: 2024-01-02 | End: 2024-08-09

## 2024-02-08 DIAGNOSIS — E11.59 TYPE 2 DIABETES MELLITUS WITH OTHER CIRCULATORY COMPLICATION, WITH LONG-TERM CURRENT USE OF INSULIN (H): ICD-10-CM

## 2024-02-08 DIAGNOSIS — Z79.4 TYPE 2 DIABETES MELLITUS WITH OTHER CIRCULATORY COMPLICATION, WITH LONG-TERM CURRENT USE OF INSULIN (H): ICD-10-CM

## 2024-02-09 RX ORDER — INSULIN LISPRO 100 [IU]/ML
INJECTION, SOLUTION INTRAVENOUS; SUBCUTANEOUS
Qty: 360 ML | Refills: 3 | OUTPATIENT
Start: 2024-02-09

## 2024-02-09 NOTE — TELEPHONE ENCOUNTER
3 month supply sent 12/1/23  Requested Prescriptions   Pending Prescriptions Disp Refills    insulin lispro (HUMALOG) 100 UNIT/ML vial [Pharmacy Med Name: HumaLOG 100 UNIT/ML Subcutaneous Solution] 360 mL 3     Sig: INJECT SUBCUTANEOUSLY 400 UNITS  DAILY VIA INSULIN PUMP       Insulin Protocol Failed - 2/8/2024 10:46 PM        Failed - Has GFR on file in past 12 months and most recent value is normal        Failed - Chart Review Required     Review Chart.    Do not approve if insulin is used in a pump.  Instead, direct refill request to the patient's endocrinologist.  If the patient doesn't have an endocrinologist, then send the refill to the patient's PCP for review            Passed - Medication is active on med list        Passed - Recent (6 mo) or future (90 days) visit within the authorizing provider's specialty     The patient must have completed an in-person or virtual visit within the past 6 months or has a future visit scheduled within the next 90 days with the authorizing provider s specialty.  Urgent care and e-visits do not quality as an office visit for this protocol.          Passed - Medication indicated for associated diagnosis     Medication is associated with one or more of the following diagnoses:   - Type 1 diabetes mellitus  - Type 2 diabetes mellitus  - Diabetic nephropathy; Prophylaxis  - Neuropathy due to diabetes mellitus; Prophylaxis  - Retinopathy due to diabetes mellitus; Prophylaxis  - Diabetes mellitus associated with cystic fibrosis  - Disorder of cardiovascular system; Prophylaxis - Type 1 diabetes mellitus   - Disorder of cardiovascular system; Prophylaxis - Type 2 diabetes mellitus            Passed - Patient is 18 years of age or older       Short Acting Insulin Protocol Passed - 2/8/2024 10:46 PM        Passed - Serum creatinine on file in past 12 months     Recent Labs   Lab Test 11/21/23  0800   CR 1.32*       Ok to refill medication if creatinine is low          Passed -  "HgbA1C in past 3 or 6 months     If HgbA1C is 8 or greater, it needs to be on file within the past 3 months.  If less than 8, must be on file within the past 6 months.     Recent Labs   Lab Test 11/21/23  0800   A1C 6.7*             Passed - Medication is active on med list        Passed - Patient is age 18 or older        Passed - Recent (6 mo) or future (30 days) visit within the authorizing provider's specialty     Patient had office visit in the last 6 months or has a visit in the next 30 days with authorizing provider or within the authorizing provider's specialty.  See \"Patient Info\" tab in inbasket, or \"Choose Columns\" in Meds & Orders section of the refill encounter.                 "

## 2024-02-22 ENCOUNTER — LAB (OUTPATIENT)
Dept: LAB | Facility: CLINIC | Age: 74
End: 2024-02-22
Payer: COMMERCIAL

## 2024-02-22 DIAGNOSIS — Z79.4 TYPE 2 DIABETES MELLITUS WITH OTHER CIRCULATORY COMPLICATION, WITH LONG-TERM CURRENT USE OF INSULIN (H): ICD-10-CM

## 2024-02-22 DIAGNOSIS — E11.59 TYPE 2 DIABETES MELLITUS WITH OTHER CIRCULATORY COMPLICATION, WITH LONG-TERM CURRENT USE OF INSULIN (H): ICD-10-CM

## 2024-02-22 LAB
ANION GAP SERPL CALCULATED.3IONS-SCNC: 13 MMOL/L (ref 7–15)
BUN SERPL-MCNC: 20.8 MG/DL (ref 8–23)
CALCIUM SERPL-MCNC: 9.2 MG/DL (ref 8.8–10.2)
CHLORIDE SERPL-SCNC: 103 MMOL/L (ref 98–107)
CREAT SERPL-MCNC: 1.16 MG/DL (ref 0.67–1.17)
DEPRECATED HCO3 PLAS-SCNC: 25 MMOL/L (ref 22–29)
EGFRCR SERPLBLD CKD-EPI 2021: 67 ML/MIN/1.73M2
GLUCOSE SERPL-MCNC: 161 MG/DL (ref 70–99)
HBA1C MFR BLD: 7 % (ref 0–5.6)
POTASSIUM SERPL-SCNC: 4 MMOL/L (ref 3.4–5.3)
SODIUM SERPL-SCNC: 141 MMOL/L (ref 135–145)

## 2024-02-22 PROCEDURE — 80048 BASIC METABOLIC PNL TOTAL CA: CPT

## 2024-02-22 PROCEDURE — 36415 COLL VENOUS BLD VENIPUNCTURE: CPT

## 2024-02-22 PROCEDURE — 83036 HEMOGLOBIN GLYCOSYLATED A1C: CPT

## 2024-02-29 NOTE — PROGRESS NOTES
[facility] END[facility]    Diabetes Note 3/1/2024    Walt Lambert, 1950, [id number]          Reason for visit      1. Type 2 diabetes mellitus with other circulatory complications (H)        HPI     Walt Lambert is a very pleasant 73 year old old male who presents for follow up.  SUMMARY:    Alvino returns in follow-up for DM 2. His current A1c is 7.0 and up slightly from his previous of 6.7. He reports that he recently had the Norovirus and was quite ill for several days. He is using the Tslim insulin pump and the [employer] 3 CGM. Both were downloaded and data was reviewed.     TIR was 87% with above, 11% and low, 2 %. This is happening about 0600. He has not required any outside assistance. GMI of 6.6. Pump shows that he is getting 81% of his insulin in Basal and only 19% in Bolus. He does use 265 units of insulin a day. Ave BG of 171.     Renal function remains within normal range. He does have Microalbuminuria, and this is stable. He is taking Bumex and Lisinopril, in addition to Beta Blocker. He is on high dose Statin.     Weight is stable.     Blood glucose data:      Past Medical History     Patient Active Problem List   Diagnosis    Diabetes mellitus (H)    Morbid obesity (H)    Cardiomyopathy (H)    Chronic obstructive pulmonary disease (H)    Hyperlipidemia    Hypertension    Sleep apnea    Insulin pump status    MHAMOOD (dyspnea on exertion)    Abnormal cardiac CT angiography    CAD (coronary artery disease)    S/P CABG (coronary artery bypass graft)    ARF (acute respiratory failure) (H)    Anemia due to blood loss, acute    Arteriosclerosis of coronary artery bypass graft    Body mass index (BMI) 50.0-59.9, adult    Pure hypertriglyceridemia    Peripheral venous insufficiency    Lymphedema    Chronic kidney disease, stage 1    Hammertoe, bilateral    Onychomycosis    Anhidrosis    Type 2 diabetes mellitus with other circulatory complications (H)    History of colon polyps        Family  History       family history includes Colon Cancer in his sister; Heart Failure in his mother; Leukemia in his father and mother; Snoring in his father and mother.    Social History      reports that he has quit smoking. He has never used smokeless tobacco. He reports current alcohol use. He reports that he does not use drugs.      Review of Systems     Patient has no polyuria or polydipsia, no chest pain, dyspnea or TIA's, no numbness, tingling or pain in extremities  Remainder negative except as noted in HPI.    Vital Signs     /82 (BP Location: Left arm, Patient Position: Sitting, Cuff Size: Adult Large)   Pulse 73   Wt (!) 173.3 kg (382 lb)   SpO2 94%   BMI 54.81 kg/m    Wt Readings from Last 3 Encounters:   03/01/24 (!) 173.3 kg (382 lb)   11/30/23 (!) 172.8 kg (381 lb)   11/16/23 (!) 172.4 kg (380 lb)       Physical Exam     Constitutional:  Well developed, Well nourished, obese  HENT:  Normocephalic,   Neck: Thyroid normal, No lymph nodes, Supple  Eyes:  PERRL, Conjunctiva pink  Respiratory:  Normal breath sounds, No respiratory distress  Cardiovascular:  Normal heart rate, Normal rhythm, No murmurs, bilateral LE edema  GI:  Bowel sounds normal, Soft, No tenderness  Musculoskeletal:  No gross deformity or lesions, normal dorsalis pedis pulses  Skin: No acanthosis nigricans, lipoatrophy or lipodystrophy  Neurologic:  Alert & oriented x 3, nonfocal  Psychiatric:  Affect, Mood, Insight appropriate  Diabetic foot exam: no ulcers, charcot's or high risk calluses,       Assessment     1. Type 2 diabetes mellitus with other circulatory complications (H)        Plan     Alvino's DM control is stable at present. No changes to his medication regimen. Follow-up with me in 3 months per Medicare guidelines for pumpers.           Emily Alfred NP  [facility]  3/1/2024  7:42 AM      Lab Results     Microalbumin Urine mg/dL   Date Value Ref Range Status   10/02/2020 17.87 (H) 0.00 - 1.99 mg/dL Final        Cholesterol   Date Value Ref Range Status   08/21/2023 118 <200 mg/dL Final     Direct Measure HDL   Date Value Ref Range Status   08/21/2023 38 (L) >=40 mg/dL Final     Triglycerides   Date Value Ref Range Status   08/21/2023 188 (H) <150 mg/dL Final       [unfilled]      Current Medications     Outpatient Medications Prior to Visit   Medication Sig Dispense Refill    ADVAIR DISKUS 250-50 mcg/dose DISKUS [ADVAIR DISKUS 250-50 MCG/DOSE DISKUS] Inhale 1 puff 2 (two) times a day.      albuterol (PROAIR HFA/PROVENTIL HFA/VENTOLIN HFA) 108 (90 Base) MCG/ACT inhaler INHALE 1 PUFF INTO THE LUNGS EVERY 4 HOURS AS NEEDED      amLODIPine (NORVASC) 10 MG tablet Take 10 mg by mouth daily      aspirin 81 MG EC tablet [ASPIRIN 81 MG EC TABLET] Take 81 mg by mouth daily.      atorvastatin (LIPITOR) 80 MG tablet Take 1 tablet (80 mg) by mouth daily 90 tablet 3    bumetanide (BUMEX) 2 MG tablet TAKE 1 TABLET BY MOUTH DAILY 100 tablet 2    Continuous Blood Gluc Sensor (MaxymiserYLE ANA M 3 SENSOR) MISC 1 Units every 14 days 6 each 3    Digital Therapy (HELLO HEART BLOOD PRESSURE) KIT       Dulaglutide (TRULICITY) 3 MG/0.5ML SOPN Inject 3 mg Subcutaneous once a week 6 mL 3    insulin lispro (HUMALOG) 100 UNIT/ML vial INJECT SUBCUTANEOUSLY 400 UNITS  DAILY VIA INSULIN PUMP 120 mL 2    lisinopril (ZESTRIL) 20 MG tablet Take 1 tablet (20 mg) by mouth 2 times daily 180 tablet 3    metoprolol tartrate (LOPRESSOR) 100 MG tablet Take 1 tablet (100 mg) by mouth 2 times daily 180 tablet 3    multivitamin (ONE A DAY) per tablet [MULTIVITAMIN (ONE A DAY) PER TABLET] Take 1 tablet by mouth daily.      omeprazole (PRILOSEC) 20 MG capsule [OMEPRAZOLE (PRILOSEC) 20 MG CAPSULE] Take 20 mg by mouth daily.      triamcinolone (KENALOG) 0.025 % cream [TRIAMCINOLONE (KENALOG) 0.025 % CREAM] Apply 1 application topically 3 (three) times a day as needed.       furosemide (LASIX) 40 MG tablet       predniSONE (DELTASONE) 20 MG tablet PLEASE SEE ATTACHED  FOR DETAILED DIRECTIONS      simvastatin (ZOCOR) 20 MG tablet Take 20 mg by mouth       No facility-administered medications prior to visit.

## 2024-03-01 ENCOUNTER — OFFICE VISIT (OUTPATIENT)
Dept: ENDOCRINOLOGY | Facility: CLINIC | Age: 74
End: 2024-03-01
Payer: COMMERCIAL

## 2024-03-01 VITALS
HEART RATE: 73 BPM | WEIGHT: 315 LBS | BODY MASS INDEX: 54.81 KG/M2 | SYSTOLIC BLOOD PRESSURE: 122 MMHG | DIASTOLIC BLOOD PRESSURE: 82 MMHG | OXYGEN SATURATION: 94 %

## 2024-03-01 DIAGNOSIS — E11.59 TYPE 2 DIABETES MELLITUS WITH OTHER CIRCULATORY COMPLICATIONS (H): Primary | ICD-10-CM

## 2024-03-01 PROBLEM — Z86.0100 HISTORY OF COLON POLYPS: Status: ACTIVE | Noted: 2024-03-01

## 2024-03-01 PROCEDURE — G2211 COMPLEX E/M VISIT ADD ON: HCPCS | Performed by: NURSE PRACTITIONER

## 2024-03-01 PROCEDURE — 99213 OFFICE O/P EST LOW 20 MIN: CPT | Performed by: NURSE PRACTITIONER

## 2024-03-01 NOTE — LETTER
3/1/2024         RE: Walt Lambert  [address] [place] [id number]        Dear Colleague,    Thank you for referring your patient, Walt Lambert, to the [facility] [place]. Please see a copy of my visit note below.    [facility]    Diabetes Note 3/1/2024    Walt Lambert, 1950, [id number]          Reason for visit      1. Type 2 diabetes mellitus with other circulatory complications (H)        HPI     Walt Lambert is a very pleasant 73 year old old male who presents for follow up.  SUMMARY:    Alvino returns in follow-up for DM 2. His current A1c is 7.0 and up slightly from his previous of 6.7. He reports that he recently had the Norovirus and was quite ill for several days. He is using the Tslim insulin pump and the Ivelisse 3 CGM. Both were downloaded and data was reviewed.     TIR was 87% with above, 11% and low, 2 %. This is happening about 0600. He has not required any outside assistance. GMI of 6.6. Pump shows that he is getting 81% of his insulin in Basal and only 19% in Bolus. He does use 265 units of insulin a day. Ave BG of 171.     Renal function remains within normal range. He does have Microalbuminuria, and this is stable. He is taking Bumex and Lisinopril, in addition to Beta Blocker. He is on high dose Statin.     Weight is stable.     Blood glucose data:      Past Medical History     Patient Active Problem List   Diagnosis     Diabetes mellitus (H)     Morbid obesity (H)     Cardiomyopathy (H)     Chronic obstructive pulmonary disease (H)     Hyperlipidemia     Hypertension     Sleep apnea     Insulin pump status     MAHMOOD (dyspnea on exertion)     Abnormal cardiac CT angiography     CAD (coronary artery disease)     S/P CABG (coronary artery bypass graft)     ARF (acute respiratory failure) (H)     Anemia due to blood loss, acute     Arteriosclerosis of coronary artery bypass graft     Body mass index (BMI) 50.0-59.9, adult      Pure hypertriglyceridemia     Peripheral venous insufficiency     Lymphedema     Chronic kidney disease, stage 1     Hammertoe, bilateral     Onychomycosis     Anhidrosis     Type 2 diabetes mellitus with other circulatory complications (H)     History of colon polyps        Family History       family history includes Colon Cancer in his sister; Heart Failure in his mother; Leukemia in his father and mother; Snoring in his father and mother.    Social History      reports that he has quit smoking. He has never used smokeless tobacco. He reports current alcohol use. He reports that he does not use drugs.      Review of Systems     Patient has no polyuria or polydipsia, no chest pain, dyspnea or TIA's, no numbness, tingling or pain in extremities  Remainder negative except as noted in HPI.    Vital Signs     /82 (BP Location: Left arm, Patient Position: Sitting, Cuff Size: Adult Large)   Pulse 73   Wt (!) 173.3 kg (382 lb)   SpO2 94%   BMI 54.81 kg/m    Wt Readings from Last 3 Encounters:   03/01/24 (!) 173.3 kg (382 lb)   11/30/23 (!) 172.8 kg (381 lb)   11/16/23 (!) 172.4 kg (380 lb)       Physical Exam     Constitutional:  Well developed, Well nourished, obese  HENT:  Normocephalic,   Neck: Thyroid normal, No lymph nodes, Supple  Eyes:  PERRL, Conjunctiva pink  Respiratory:  Normal breath sounds, No respiratory distress  Cardiovascular:  Normal heart rate, Normal rhythm, No murmurs, bilateral LE edema  GI:  Bowel sounds normal, Soft, No tenderness  Musculoskeletal:  No gross deformity or lesions, normal dorsalis pedis pulses  Skin: No acanthosis nigricans, lipoatrophy or lipodystrophy  Neurologic:  Alert & oriented x 3, nonfocal  Psychiatric:  Affect, Mood, Insight appropriate  Diabetic foot exam: no ulcers, charcot's or high risk calluses,       Assessment     1. Type 2 diabetes mellitus with other circulatory complications (H)        Plan     Alvino's DM control is stable at present. No changes to  his medication regimen. Follow-up with me in 3 months per Medicare guidelines for pumpers.           Emily Alfred NP  [facility]  3/1/2024  7:42 AM      Lab Results     Microalbumin Urine mg/dL   Date Value Ref Range Status   10/02/2020 17.87 (H) 0.00 - 1.99 mg/dL Final       Cholesterol   Date Value Ref Range Status   08/21/2023 118 <200 mg/dL Final     Direct Measure HDL   Date Value Ref Range Status   08/21/2023 38 (L) >=40 mg/dL Final     Triglycerides   Date Value Ref Range Status   08/21/2023 188 (H) <150 mg/dL Final       [unfilled]      Current Medications     Outpatient Medications Prior to Visit   Medication Sig Dispense Refill     ADVAIR DISKUS 250-50 mcg/dose DISKUS [ADVAIR DISKUS 250-50 MCG/DOSE DISKUS] Inhale 1 puff 2 (two) times a day.       albuterol (PROAIR HFA/PROVENTIL HFA/VENTOLIN HFA) 108 (90 Base) MCG/ACT inhaler INHALE 1 PUFF INTO THE LUNGS EVERY 4 HOURS AS NEEDED       amLODIPine (NORVASC) 10 MG tablet Take 10 mg by mouth daily       aspirin 81 MG EC tablet [ASPIRIN 81 MG EC TABLET] Take 81 mg by mouth daily.       atorvastatin (LIPITOR) 80 MG tablet Take 1 tablet (80 mg) by mouth daily 90 tablet 3     bumetanide (BUMEX) 2 MG tablet TAKE 1 TABLET BY MOUTH DAILY 100 tablet 2     Continuous Blood Gluc Sensor (FREESTYLE ANA M 3 SENSOR) MISC 1 Units every 14 days 6 each 3     Digital Therapy (HELLO HEART BLOOD PRESSURE) KIT        Dulaglutide (TRULICITY) 3 MG/0.5ML SOPN Inject 3 mg Subcutaneous once a week 6 mL 3     insulin lispro (HUMALOG) 100 UNIT/ML vial INJECT SUBCUTANEOUSLY 400 UNITS  DAILY VIA INSULIN PUMP 120 mL 2     lisinopril (ZESTRIL) 20 MG tablet Take 1 tablet (20 mg) by mouth 2 times daily 180 tablet 3     metoprolol tartrate (LOPRESSOR) 100 MG tablet Take 1 tablet (100 mg) by mouth 2 times daily 180 tablet 3     multivitamin (ONE A DAY) per tablet [MULTIVITAMIN (ONE A DAY) PER TABLET] Take 1 tablet by mouth daily.       omeprazole (PRILOSEC) 20 MG capsule [OMEPRAZOLE  (PRILOSEC) 20 MG CAPSULE] Take 20 mg by mouth daily.       triamcinolone (KENALOG) 0.025 % cream [TRIAMCINOLONE (KENALOG) 0.025 % CREAM] Apply 1 application topically 3 (three) times a day as needed.        furosemide (LASIX) 40 MG tablet        predniSONE (DELTASONE) 20 MG tablet PLEASE SEE ATTACHED FOR DETAILED DIRECTIONS       simvastatin (ZOCOR) 20 MG tablet Take 20 mg by mouth       No facility-administered medications prior to visit.                       Again, thank you for allowing me to participate in the care of your patient.        Sincerely,        Emily Alfred NP

## 2024-03-15 ENCOUNTER — TELEPHONE (OUTPATIENT)
Dept: PHARMACY | Facility: CLINIC | Age: 74
End: 2024-03-15
Payer: COMMERCIAL

## 2024-03-15 DIAGNOSIS — E11.59 TYPE 2 DIABETES MELLITUS WITH OTHER CIRCULATORY COMPLICATIONS (H): ICD-10-CM

## 2024-03-15 RX ORDER — INSULIN PUMP CARTRIDGE
1 CARTRIDGE (EA) SUBCUTANEOUS
Qty: 30 EACH | Refills: 3 | Status: SHIPPED | OUTPATIENT
Start: 2024-03-15

## 2024-03-15 RX ORDER — INFUSION SET FOR INSULIN PUMP
1 INFUSION SETS-PARAPHERNALIA MISCELLANEOUS
Qty: 30 EACH | Refills: 3 | Status: SHIPPED | OUTPATIENT
Start: 2024-03-15

## 2024-03-15 NOTE — TELEPHONE ENCOUNTER
"Resending prescription for tandem infusion sets and cartridges for directions \"change every 3 days\" for 90 day supply per request of [facility].    Isaiah Vickers, PharmD, [facility]  Endocrine & Diabetes [facility] Pharmacist  [address] [place] [id number]    "

## 2024-03-16 ENCOUNTER — MYC REFILL (OUTPATIENT)
Dept: ENDOCRINOLOGY | Facility: CLINIC | Age: 74
End: 2024-03-16
Payer: COMMERCIAL

## 2024-03-16 DIAGNOSIS — Z79.4 TYPE 2 DIABETES MELLITUS WITH OTHER CIRCULATORY COMPLICATION, WITH LONG-TERM CURRENT USE OF INSULIN (H): ICD-10-CM

## 2024-03-16 DIAGNOSIS — E11.59 TYPE 2 DIABETES MELLITUS WITH OTHER CIRCULATORY COMPLICATION, WITH LONG-TERM CURRENT USE OF INSULIN (H): ICD-10-CM

## 2024-03-18 RX ORDER — DULAGLUTIDE 3 MG/.5ML
3 INJECTION, SOLUTION SUBCUTANEOUS WEEKLY
Qty: 6 ML | Refills: 0 | Status: SHIPPED | OUTPATIENT
Start: 2024-03-18 | End: 2024-05-28

## 2024-03-18 NOTE — TELEPHONE ENCOUNTER
Requested Prescriptions   Pending Prescriptions Disp Refills    Dulaglutide (TRULICITY) 3 MG/0.5ML SOPN 6 mL 3     Sig: Inject 3 mg Subcutaneous once a week       GLP-1 Agonists Protocol Passed - 3/16/2024  8:06 AM        Passed - HgbA1C in past 3 or 6 months     If HgbA1C is 8 or greater, it needs to be on file within the past 3 months.  If less than 8, must be on file within the past 6 months.     Recent Labs   Lab Test 02/22/24  0739   A1C 7.0*             Passed - Medication is active on med list        Passed - Has GFR on file in past 12 months and most recent value is normal        Passed - Recent (6 mo) or future (90 days) visit within the authorizing provider's specialty     The patient must have completed an in-person or virtual visit within the past 6 months or has a future visit scheduled within the next 90 days with the authorizing provider s specialty.  Urgent care and e-visits do not quality as an office visit for this protocol.          Passed - Medication indicated for associated diagnosis     Medication is associated with one or more of the following diagnoses:     Type 2 diabetes mellitus           Passed - Patient is age 18 or older

## 2024-04-10 ENCOUNTER — MEDICAL CORRESPONDENCE (OUTPATIENT)
Dept: HEALTH INFORMATION MANAGEMENT | Facility: CLINIC | Age: 74
End: 2024-04-10
Payer: COMMERCIAL

## 2024-04-13 DIAGNOSIS — Z79.4 TYPE 2 DIABETES MELLITUS WITH OTHER CIRCULATORY COMPLICATION, WITH LONG-TERM CURRENT USE OF INSULIN (H): ICD-10-CM

## 2024-04-13 DIAGNOSIS — E11.59 TYPE 2 DIABETES MELLITUS WITH OTHER CIRCULATORY COMPLICATION, WITH LONG-TERM CURRENT USE OF INSULIN (H): ICD-10-CM

## 2024-04-15 RX ORDER — INSULIN LISPRO 100 [IU]/ML
INJECTION, SOLUTION INTRAVENOUS; SUBCUTANEOUS
Qty: 120 ML | Refills: 1 | Status: SHIPPED | OUTPATIENT
Start: 2024-04-15 | End: 2024-06-10

## 2024-05-06 DIAGNOSIS — Z79.4 TYPE 2 DIABETES MELLITUS WITH OTHER CIRCULATORY COMPLICATION, WITH LONG-TERM CURRENT USE OF INSULIN (H): ICD-10-CM

## 2024-05-06 DIAGNOSIS — E11.59 TYPE 2 DIABETES MELLITUS WITH OTHER CIRCULATORY COMPLICATION, WITH LONG-TERM CURRENT USE OF INSULIN (H): ICD-10-CM

## 2024-05-07 RX ORDER — DULAGLUTIDE 3 MG/.5ML
INJECTION, SOLUTION SUBCUTANEOUS
Qty: 6 ML | Refills: 3 | OUTPATIENT
Start: 2024-05-07

## 2024-05-07 NOTE — TELEPHONE ENCOUNTER
Requested Prescriptions   Pending Prescriptions Disp Refills    TRULICITY 3 MG/0.5ML SOPN [Pharmacy Med Name: Trulicity 3 MG/0.5ML Subcutaneous Solution Pen-injector] 6 mL 3     Sig: INJECT THE CONTENTS OF ONE PEN  SUBCUTANEOUSLY WEEKLY AS  DIRECTED       GLP-1 Agonists Protocol Passed - 5/6/2024  9:27 PM        Passed - HgbA1C in past 3 or 6 months     If HgbA1C is 8 or greater, it needs to be on file within the past 3 months.  If less than 8, must be on file within the past 6 months.     Recent Labs   Lab Test 02/22/24  0739   A1C 7.0*             Passed - Medication is active on med list        Passed - Has GFR on file in past 12 months and most recent value is normal        Passed - Recent (6 mo) or future (90 days) visit within the authorizing provider's specialty     The patient must have completed an in-person or virtual visit within the past 6 months or has a future visit scheduled within the next 90 days with the authorizing provider s specialty.  Urgent care and e-visits do not quality as an office visit for this protocol.          Passed - Medication indicated for associated diagnosis     Medication is associated with one or more of the following diagnoses:     Type 2 diabetes mellitus           Passed - Patient is age 18 or older

## 2024-05-12 DIAGNOSIS — Z79.4 TYPE 2 DIABETES MELLITUS WITH OTHER CIRCULATORY COMPLICATION, WITH LONG-TERM CURRENT USE OF INSULIN (H): ICD-10-CM

## 2024-05-12 DIAGNOSIS — E11.59 TYPE 2 DIABETES MELLITUS WITH OTHER CIRCULATORY COMPLICATION, WITH LONG-TERM CURRENT USE OF INSULIN (H): ICD-10-CM

## 2024-05-13 RX ORDER — INSULIN LISPRO 100 [IU]/ML
INJECTION, SOLUTION INTRAVENOUS; SUBCUTANEOUS
Qty: 240 ML | Refills: 5 | OUTPATIENT
Start: 2024-05-13

## 2024-05-13 NOTE — TELEPHONE ENCOUNTER
Requested Prescriptions   Pending Prescriptions Disp Refills    insulin lispro (HUMALOG) 100 UNIT/ML vial [Pharmacy Med Name: HumaLOG 100 UNIT/ML Subcutaneous Solution] 240 mL 5     Sig: INJECT SUBCUTANEOUSLY 400 UNITS  DAILY VIA INSULIN PUMP       Insulin Protocol Failed - 5/12/2024 10:27 PM        Failed - Chart Review Required     Review Chart.    Do not approve if insulin is used in a pump.  Instead, direct refill request to the patient's endocrinologist.  If the patient doesn't have an endocrinologist, then send the refill to the patient's PCP for review            Passed - Medication is active on med list        Passed - Has GFR on file in past 12 months and most recent value is normal        Passed - Recent (6 mo) or future (90 days) visit within the authorizing provider's specialty     The patient must have completed an in-person or virtual visit within the past 6 months or has a future visit scheduled within the next 90 days with the authorizing provider s specialty.  Urgent care and e-visits do not quality as an office visit for this protocol.          Passed - Medication indicated for associated diagnosis     Medication is associated with one or more of the following diagnoses:   - Type 1 diabetes mellitus  - Type 2 diabetes mellitus  - Diabetic nephropathy; Prophylaxis  - Neuropathy due to diabetes mellitus; Prophylaxis  - Retinopathy due to diabetes mellitus; Prophylaxis  - Diabetes mellitus associated with cystic fibrosis  - Disorder of cardiovascular system; Prophylaxis - Type 1 diabetes mellitus   - Disorder of cardiovascular system; Prophylaxis - Type 2 diabetes mellitus            Passed - Patient is 18 years of age or older

## 2024-05-25 DIAGNOSIS — E11.59 TYPE 2 DIABETES MELLITUS WITH OTHER CIRCULATORY COMPLICATION, WITH LONG-TERM CURRENT USE OF INSULIN (H): ICD-10-CM

## 2024-05-25 DIAGNOSIS — Z79.4 TYPE 2 DIABETES MELLITUS WITH OTHER CIRCULATORY COMPLICATION, WITH LONG-TERM CURRENT USE OF INSULIN (H): ICD-10-CM

## 2024-05-28 RX ORDER — DULAGLUTIDE 3 MG/.5ML
INJECTION, SOLUTION SUBCUTANEOUS
Qty: 6 ML | Refills: 0 | Status: SHIPPED | OUTPATIENT
Start: 2024-05-28 | End: 2024-09-05

## 2024-05-28 NOTE — TELEPHONE ENCOUNTER
2 month supply of humalog sent 4/15 pt has follow up 6/7    Requested Prescriptions   Pending Prescriptions Disp Refills    TRULICITY 3 MG/0.5ML SOPN [Pharmacy Med Name: Trulicity 3 MG/0.5ML Subcutaneous Solution Pen-injector] 6 mL 3     Sig: INJECT THE CONTENTS OF ONE PEN  SUBCUTANEOUSLY WEEKLY AS  DIRECTED       GLP-1 Agonists Protocol Passed - 5/25/2024 11:37 PM        Passed - HgbA1C in past 3 or 6 months     If HgbA1C is 8 or greater, it needs to be on file within the past 3 months.  If less than 8, must be on file within the past 6 months.     Recent Labs   Lab Test 02/22/24  0739   A1C 7.0*             Passed - Medication is active on med list        Passed - Has GFR on file in past 12 months and most recent value is normal        Passed - Recent (6 mo) or future (90 days) visit within the authorizing provider's specialty     The patient must have completed an in-person or virtual visit within the past 6 months or has a future visit scheduled within the next 90 days with the authorizing provider s specialty.  Urgent care and e-visits do not quality as an office visit for this protocol.          Passed - Medication indicated for associated diagnosis     Medication is associated with one or more of the following diagnoses:     Type 2 diabetes mellitus           Passed - Patient is age 18 or older          insulin lispro (HUMALOG) 100 UNIT/ML vial [Pharmacy Med Name: HumaLOG 100 UNIT/ML Subcutaneous Solution] 240 mL 5     Sig: INJECT SUBCUTANEOUSLY 400 UNITS  DAILY VIA INSULIN PUMP       Insulin Protocol Failed - 5/25/2024 11:37 PM        Failed - Chart Review Required     Review Chart.    Do not approve if insulin is used in a pump.  Instead, direct refill request to the patient's endocrinologist.  If the patient doesn't have an endocrinologist, then send the refill to the patient's PCP for review            Passed - Medication is active on med list        Passed - Has GFR on file in past 12 months and most  recent value is normal        Passed - Recent (6 mo) or future (90 days) visit within the authorizing provider's specialty     The patient must have completed an in-person or virtual visit within the past 6 months or has a future visit scheduled within the next 90 days with the authorizing provider s specialty.  Urgent care and e-visits do not quality as an office visit for this protocol.          Passed - Medication indicated for associated diagnosis     Medication is associated with one or more of the following diagnoses:   - Type 1 diabetes mellitus  - Type 2 diabetes mellitus  - Diabetic nephropathy; Prophylaxis  - Neuropathy due to diabetes mellitus; Prophylaxis  - Retinopathy due to diabetes mellitus; Prophylaxis  - Diabetes mellitus associated with cystic fibrosis  - Disorder of cardiovascular system; Prophylaxis - Type 1 diabetes mellitus   - Disorder of cardiovascular system; Prophylaxis - Type 2 diabetes mellitus            Passed - Patient is 18 years of age or older

## 2024-05-30 ENCOUNTER — LAB (OUTPATIENT)
Dept: LAB | Facility: CLINIC | Age: 74
End: 2024-05-30
Payer: COMMERCIAL

## 2024-05-30 DIAGNOSIS — E11.59 TYPE 2 DIABETES MELLITUS WITH OTHER CIRCULATORY COMPLICATIONS (H): ICD-10-CM

## 2024-05-30 LAB
ANION GAP SERPL CALCULATED.3IONS-SCNC: 9 MMOL/L (ref 7–15)
BUN SERPL-MCNC: 21.8 MG/DL (ref 8–23)
CALCIUM SERPL-MCNC: 8.9 MG/DL (ref 8.8–10.2)
CHLORIDE SERPL-SCNC: 105 MMOL/L (ref 98–107)
CREAT SERPL-MCNC: 1.24 MG/DL (ref 0.67–1.17)
DEPRECATED HCO3 PLAS-SCNC: 27 MMOL/L (ref 22–29)
EGFRCR SERPLBLD CKD-EPI 2021: 62 ML/MIN/1.73M2
GLUCOSE SERPL-MCNC: 129 MG/DL (ref 70–99)
HBA1C MFR BLD: 6 % (ref 0–5.6)
POTASSIUM SERPL-SCNC: 4.1 MMOL/L (ref 3.4–5.3)
SODIUM SERPL-SCNC: 141 MMOL/L (ref 135–145)

## 2024-05-30 PROCEDURE — 83036 HEMOGLOBIN GLYCOSYLATED A1C: CPT

## 2024-05-30 PROCEDURE — 80048 BASIC METABOLIC PNL TOTAL CA: CPT

## 2024-05-30 PROCEDURE — 36415 COLL VENOUS BLD VENIPUNCTURE: CPT

## 2024-06-06 NOTE — PROGRESS NOTES
[facility]    Diabetes Note 6/7/2024    Walt Lambert, 1950, [id number]          Reason for visit      1. Type 2 diabetes mellitus with other circulatory complications (H)    2. Morbid obesity (H)        HPI     Walt Lambert is a very pleasant 73 year old old male who presents for follow up.  SUMMARY:    Alvino is seen today in follow-up for DM 2 and Obesity. His current A1c is 6.0 and quite improved from his previous of 7.0.     He continues to use the Tslim  insulin pump and Ivelisse CGM, which was downloaded today and data was reviewed.     TIR was 96%, Above 5% and no hypoglycemia. GMI of 6.4.  Ave BG was 154. Noticeable is that his highest BG have come down well below 300. Basal/Bolus ratio has also improved with 63/36 and formerly 81/19.  Daily insulin use has dropped to 178 units a day from 265.    He is also taking Trulicity, 3 mg weekly. He has had some trouble getting it consistently, but has it currently.     He is currently doing the Keto diet, as well as intermittent fasting.     He is treating his Neuropathy with Laser treatments, and has regained some sensation. The lower BG are also contributory.     He has lost over 30 lbs from where he started last year. He reports that he was close to 400 lbs.     Blood glucose data:      Past Medical History     Patient Active Problem List   Diagnosis    Diabetes mellitus (H)    Morbid obesity (H)    Cardiomyopathy (H)    Chronic obstructive pulmonary disease (H)    Hyperlipidemia    Hypertension    Sleep apnea    Insulin pump status    MAHMOOD (dyspnea on exertion)    Abnormal cardiac CT angiography    CAD (coronary artery disease)    S/P CABG (coronary artery bypass graft)    ARF (acute respiratory failure) (H)    Anemia due to blood loss, acute    Arteriosclerosis of coronary artery bypass graft    Body mass index (BMI) 50.0-59.9, adult    Pure hypertriglyceridemia    Peripheral venous insufficiency    Lymphedema    Chronic kidney  disease, stage 1    Hammertoe, bilateral    Onychomycosis    Anhidrosis    Type 2 diabetes mellitus with other circulatory complications (H)    History of colon polyps        Family History       family history includes Colon Cancer in his sister; Heart Failure in his mother; Leukemia in his father and mother; Snoring in his father and mother.    Social History      reports that he has quit smoking. He has never used smokeless tobacco. He reports current alcohol use. He reports that he does not use drugs.      Review of Systems     Patient has no polyuria or polydipsia, no chest pain, dyspnea or TIA's, no numbness, tingling or pain in extremities  Remainder negative except as noted in HPI.    Vital Signs     /76 (BP Location: Left arm, Patient Position: Sitting, Cuff Size: Adult Large)   Pulse 74   Wt (!) 162.3 kg (357 lb 14.4 oz)   SpO2 93%   BMI 51.35 kg/m    Wt Readings from Last 3 Encounters:   06/07/24 (!) 162.3 kg (357 lb 14.4 oz)   03/01/24 (!) 173.3 kg (382 lb)   11/30/23 (!) 172.8 kg (381 lb)       Physical Exam     Constitutional:  Well developed, Well nourished  HENT:  Normocephalic,   Neck: normal in appearance  Eyes:  PERRL, Conjunctiva pink  Respiratory:  No respiratory distress  Skin: No acanthosis nigricans, lipoatrophy or lipodystrophy  Neurologic:  Alert & oriented x 3, nonfocal  Psychiatric:  Affect, Mood, Insight appropriate        Assessment     1. Type 2 diabetes mellitus with other circulatory complications (H)    2. Morbid obesity (H)        Plan     Great improvement with the new dietary practice, and improved movement. No changes warranted. Follow-up with me in 3 months per Medicare Guidelines.           Emily Alfred NP  [facility]  6/7/2024  7:37 AM    Lab Results     Microalbumin Urine mg/dL   Date Value Ref Range Status   10/02/2020 17.87 (H) 0.00 - 1.99 mg/dL Final       Cholesterol   Date Value Ref Range Status   08/21/2023 118 <200 mg/dL Final     Direct Measure HDL    Date Value Ref Range Status   08/21/2023 38 (L) >=40 mg/dL Final     Triglycerides   Date Value Ref Range Status   08/21/2023 188 (H) <150 mg/dL Final       [unfilled]      Current Medications     Outpatient Medications Prior to Visit   Medication Sig Dispense Refill    ADVAIR DISKUS 250-50 mcg/dose DISKUS [ADVAIR DISKUS 250-50 MCG/DOSE DISKUS] Inhale 1 puff 2 (two) times a day.      albuterol (PROAIR HFA/PROVENTIL HFA/VENTOLIN HFA) 108 (90 Base) MCG/ACT inhaler INHALE 1 PUFF INTO THE LUNGS EVERY 4 HOURS AS NEEDED      amLODIPine (NORVASC) 10 MG tablet Take 10 mg by mouth daily      aspirin 81 MG EC tablet [ASPIRIN 81 MG EC TABLET] Take 81 mg by mouth daily.      atorvastatin (LIPITOR) 80 MG tablet Take 1 tablet (80 mg) by mouth daily 90 tablet 3    bumetanide (BUMEX) 2 MG tablet TAKE 1 TABLET BY MOUTH DAILY 100 tablet 2    Cholecalciferol (VITAMIN D-3) 25 MCG (1000 UT) CAPS 1 capsule      Continuous Blood Gluc Sensor (FREESTYLE ANA M 3 SENSOR) MISC 1 Units every 14 days 6 each 3    Digital Therapy (HELLO HEART BLOOD PRESSURE) KIT       Insulin Infusion Pump Supplies (AUTOSOFT 90 INFUSION SET) MISC 1 each every 3 days 30 each 3    Insulin Infusion Pump Supplies (T:SLIM X2 3ML CARTRIDGE) MISC 1 each every 3 days 30 each 3    insulin lispro (HUMALOG) 100 UNIT/ML vial INJECT SUBCUTANEOUSLY 400 UNITS  DAILY VIA INSULIN PUMP 120 mL 1    lisinopril (ZESTRIL) 20 MG tablet Take 1 tablet (20 mg) by mouth 2 times daily 180 tablet 3    metoprolol tartrate (LOPRESSOR) 100 MG tablet Take 1 tablet (100 mg) by mouth 2 times daily 180 tablet 3    multivitamin (ONE A DAY) per tablet [MULTIVITAMIN (ONE A DAY) PER TABLET] Take 1 tablet by mouth daily.      omeprazole (PRILOSEC) 20 MG capsule [OMEPRAZOLE (PRILOSEC) 20 MG CAPSULE] Take 20 mg by mouth daily.      triamcinolone (KENALOG) 0.025 % cream [TRIAMCINOLONE (KENALOG) 0.025 % CREAM] Apply 1 application topically 3 (three) times a day as needed.       TRULICITY 3 MG/0.5ML SOPN  INJECT THE CONTENTS OF ONE PEN  SUBCUTANEOUSLY WEEKLY AS  DIRECTED 6 mL 0     No facility-administered medications prior to visit.

## 2024-06-07 ENCOUNTER — OFFICE VISIT (OUTPATIENT)
Dept: ENDOCRINOLOGY | Facility: CLINIC | Age: 74
End: 2024-06-07
Payer: COMMERCIAL

## 2024-06-07 VITALS
BODY MASS INDEX: 51.35 KG/M2 | OXYGEN SATURATION: 93 % | HEART RATE: 74 BPM | SYSTOLIC BLOOD PRESSURE: 123 MMHG | WEIGHT: 315 LBS | DIASTOLIC BLOOD PRESSURE: 76 MMHG

## 2024-06-07 DIAGNOSIS — E11.59 TYPE 2 DIABETES MELLITUS WITH OTHER CIRCULATORY COMPLICATIONS (H): Primary | ICD-10-CM

## 2024-06-07 DIAGNOSIS — E66.01 MORBID OBESITY (H): ICD-10-CM

## 2024-06-07 PROCEDURE — 99214 OFFICE O/P EST MOD 30 MIN: CPT | Performed by: NURSE PRACTITIONER

## 2024-06-07 RX ORDER — CHOLECALCIFEROL (VITAMIN D3) 25 MCG
1 CAPSULE ORAL
COMMUNITY

## 2024-06-07 NOTE — LETTER
6/7/2024      Walt Lambert  [address] [place] [id number]      Dear Colleague,    Thank you for referring your patient, Walt Lambert, to the [facility] [place]. Please see a copy of my visit note below.      [facility] ENDOCRINOLOGY    Diabetes Note 6/7/2024    Walt Lambert, 1950, [id number]          Reason for visit      1. Type 2 diabetes mellitus with other circulatory complications (H)    2. Morbid obesity (H)        HPI     Walt Lambert is a very pleasant 73 year old old male who presents for follow up.  SUMMARY:    Alvino is seen today in follow-up for DM 2 and Obesity. His current A1c is 6.0 and quite improved from his previous of 7.0.     He continues to use the Tslim  insulin pump and Ivelisse CGM, which was downloaded today and data was reviewed.     TIR was 96%, Above 5% and no hypoglycemia. GMI of 6.4.  Ave BG was 154. Noticeable is that his highest BG have come down well below 300. Basal/Bolus ratio has also improved with 63/36 and formerly 81/19.  Daily insulin use has dropped to 178 units a day from 265.    He is also taking Trulicity, 3 mg weekly. He has had some trouble getting it consistently, but has it currently.     He is currently doing the Keto diet, as well as intermittent fasting.     He is treating his Neuropathy with Laser treatments, and has regained some sensation. The lower BG are also contributory.     He has lost over 30 lbs from where he started last year. He reports that he was close to 400 lbs.     Blood glucose data:      Past Medical History     Patient Active Problem List   Diagnosis     Diabetes mellitus (H)     Morbid obesity (H)     Cardiomyopathy (H)     Chronic obstructive pulmonary disease (H)     Hyperlipidemia     Hypertension     Sleep apnea     Insulin pump status     MAHMOOD (dyspnea on exertion)     Abnormal cardiac CT angiography     CAD (coronary artery disease)     S/P CABG (coronary artery bypass graft)      ARF (acute respiratory failure) (H)     Anemia due to blood loss, acute     Arteriosclerosis of coronary artery bypass graft     Body mass index (BMI) 50.0-59.9, adult     Pure hypertriglyceridemia     Peripheral venous insufficiency     Lymphedema     Chronic kidney disease, stage 1     Hammertoe, bilateral     Onychomycosis     Anhidrosis     Type 2 diabetes mellitus with other circulatory complications (H)     History of colon polyps        Family History       family history includes Colon Cancer in his sister; Heart Failure in his mother; Leukemia in his father and mother; Snoring in his father and mother.    Social History      reports that he has quit smoking. He has never used smokeless tobacco. He reports current alcohol use. He reports that he does not use drugs.      Review of Systems     Patient has no polyuria or polydipsia, no chest pain, dyspnea or TIA's, no numbness, tingling or pain in extremities  Remainder negative except as noted in HPI.    Vital Signs     /76 (BP Location: Left arm, Patient Position: Sitting, Cuff Size: Adult Large)   Pulse 74   Wt (!) 162.3 kg (357 lb 14.4 oz)   SpO2 93%   BMI 51.35 kg/m    Wt Readings from Last 3 Encounters:   06/07/24 (!) 162.3 kg (357 lb 14.4 oz)   03/01/24 (!) 173.3 kg (382 lb)   11/30/23 (!) 172.8 kg (381 lb)       Physical Exam     Constitutional:  Well developed, Well nourished  HENT:  Normocephalic,   Neck: normal in appearance  Eyes:  PERRL, Conjunctiva pink  Respiratory:  No respiratory distress  Skin: No acanthosis nigricans, lipoatrophy or lipodystrophy  Neurologic:  Alert & oriented x 3, nonfocal  Psychiatric:  Affect, Mood, Insight appropriate        Assessment     1. Type 2 diabetes mellitus with other circulatory complications (H)    2. Morbid obesity (H)        Plan     Great improvement with the new dietary practice, and improved movement. No changes warranted. Follow-up with me in 3 months per Medicare Guidelines.            Emily Alfred NP   Endocrinology  6/7/2024  7:37 AM    Lab Results     Microalbumin Urine mg/dL   Date Value Ref Range Status   10/02/2020 17.87 (H) 0.00 - 1.99 mg/dL Final       Cholesterol   Date Value Ref Range Status   08/21/2023 118 <200 mg/dL Final     Direct Measure HDL   Date Value Ref Range Status   08/21/2023 38 (L) >=40 mg/dL Final     Triglycerides   Date Value Ref Range Status   08/21/2023 188 (H) <150 mg/dL Final       [unfilled]      Current Medications     Outpatient Medications Prior to Visit   Medication Sig Dispense Refill     ADVAIR DISKUS 250-50 mcg/dose DISKUS [ADVAIR DISKUS 250-50 MCG/DOSE DISKUS] Inhale 1 puff 2 (two) times a day.       albuterol (PROAIR HFA/PROVENTIL HFA/VENTOLIN HFA) 108 (90 Base) MCG/ACT inhaler INHALE 1 PUFF INTO THE LUNGS EVERY 4 HOURS AS NEEDED       amLODIPine (NORVASC) 10 MG tablet Take 10 mg by mouth daily       aspirin 81 MG EC tablet [ASPIRIN 81 MG EC TABLET] Take 81 mg by mouth daily.       atorvastatin (LIPITOR) 80 MG tablet Take 1 tablet (80 mg) by mouth daily 90 tablet 3     bumetanide (BUMEX) 2 MG tablet TAKE 1 TABLET BY MOUTH DAILY 100 tablet 2     Cholecalciferol (VITAMIN D-3) 25 MCG (1000 UT) CAPS 1 capsule       Continuous Blood Gluc Sensor (FREESTYLE ANA M 3 SENSOR) MISC 1 Units every 14 days 6 each 3     Digital Therapy (HELLO HEART BLOOD PRESSURE) KIT        Insulin Infusion Pump Supplies (AUTOSOFT 90 INFUSION SET) MISC 1 each every 3 days 30 each 3     Insulin Infusion Pump Supplies (T:SLIM X2 3ML CARTRIDGE) MISC 1 each every 3 days 30 each 3     insulin lispro (HUMALOG) 100 UNIT/ML vial INJECT SUBCUTANEOUSLY 400 UNITS  DAILY VIA INSULIN PUMP 120 mL 1     lisinopril (ZESTRIL) 20 MG tablet Take 1 tablet (20 mg) by mouth 2 times daily 180 tablet 3     metoprolol tartrate (LOPRESSOR) 100 MG tablet Take 1 tablet (100 mg) by mouth 2 times daily 180 tablet 3     multivitamin (ONE A DAY) per tablet [MULTIVITAMIN (ONE A DAY) PER TABLET] Take 1  tablet by mouth daily.       omeprazole (PRILOSEC) 20 MG capsule [OMEPRAZOLE (PRILOSEC) 20 MG CAPSULE] Take 20 mg by mouth daily.       triamcinolone (KENALOG) 0.025 % cream [TRIAMCINOLONE (KENALOG) 0.025 % CREAM] Apply 1 application topically 3 (three) times a day as needed.        TRULICITY 3 MG/0.5ML SOPN INJECT THE CONTENTS OF ONE PEN  SUBCUTANEOUSLY WEEKLY AS  DIRECTED 6 mL 0     No facility-administered medications prior to visit.                         Again, thank you for allowing me to participate in the care of your patient.        Sincerely,        Emily Alfred NP

## 2024-06-10 RX ORDER — INSULIN LISPRO 100 [IU]/ML
INJECTION, SOLUTION INTRAVENOUS; SUBCUTANEOUS
Qty: 120 ML | Refills: 2 | Status: SHIPPED | OUTPATIENT
Start: 2024-06-10 | End: 2024-08-23

## 2024-07-16 DIAGNOSIS — E11.59 TYPE 2 DIABETES MELLITUS WITH OTHER CIRCULATORY COMPLICATION, WITH LONG-TERM CURRENT USE OF INSULIN (H): ICD-10-CM

## 2024-07-16 DIAGNOSIS — Z79.4 TYPE 2 DIABETES MELLITUS WITH OTHER CIRCULATORY COMPLICATION, WITH LONG-TERM CURRENT USE OF INSULIN (H): ICD-10-CM

## 2024-07-17 RX ORDER — DULAGLUTIDE 3 MG/.5ML
INJECTION, SOLUTION SUBCUTANEOUS
Qty: 6 ML | Refills: 3 | OUTPATIENT
Start: 2024-07-17

## 2024-08-04 DIAGNOSIS — E11.59 TYPE 2 DIABETES MELLITUS WITH OTHER CIRCULATORY COMPLICATION, WITH LONG-TERM CURRENT USE OF INSULIN (H): ICD-10-CM

## 2024-08-04 DIAGNOSIS — Z79.4 TYPE 2 DIABETES MELLITUS WITH OTHER CIRCULATORY COMPLICATION, WITH LONG-TERM CURRENT USE OF INSULIN (H): ICD-10-CM

## 2024-08-05 RX ORDER — INSULIN LISPRO 100 [IU]/ML
INJECTION, SOLUTION INTRAVENOUS; SUBCUTANEOUS
Qty: 360 ML | Refills: 3 | OUTPATIENT
Start: 2024-08-05

## 2024-08-05 NOTE — TELEPHONE ENCOUNTER
Requested Prescriptions   Pending Prescriptions Disp Refills    insulin lispro (HUMALOG) 100 UNIT/ML vial [Pharmacy Med Name: HumaLOG 100 UNIT/ML Subcutaneous Solution] 360 mL 3     Sig: INJECT SUBCUTANEOUSLY 400 UNITS  DAILY VIA INSULIN PUMP       Insulin Protocol Failed - 8/4/2024  9:06 PM        Failed - Chart Review Required     Review Chart.    Do not approve if insulin is used in a pump.  Instead, direct refill request to the patient's endocrinologist.  If the patient doesn't have an endocrinologist, then send the refill to the patient's PCP for review            Passed - Medication is active on med list        Passed - Has GFR on file in past 12 months and most recent value is normal        Passed - Recent (6 mo) or future (90 days) visit within the authorizing provider's specialty     The patient must have completed an in-person or virtual visit within the past 6 months or has a future visit scheduled within the next 90 days with the authorizing provider s specialty.  Urgent care and e-visits do not quality as an office visit for this protocol.          Passed - Medication indicated for associated diagnosis     Medication is associated with one or more of the following diagnoses:   - Type 1 diabetes mellitus  - Type 2 diabetes mellitus  - Diabetic nephropathy; Prophylaxis  - Neuropathy due to diabetes mellitus; Prophylaxis  - Retinopathy due to diabetes mellitus; Prophylaxis  - Diabetes mellitus associated with cystic fibrosis  - Disorder of cardiovascular system; Prophylaxis - Type 1 diabetes mellitus   - Disorder of cardiovascular system; Prophylaxis - Type 2 diabetes mellitus            Passed - Patient is 18 years of age or older

## 2024-08-08 DIAGNOSIS — R06.09 DOE (DYSPNEA ON EXERTION): ICD-10-CM

## 2024-08-09 RX ORDER — BUMETANIDE 2 MG/1
2 TABLET ORAL DAILY
Qty: 100 TABLET | Refills: 0 | Status: SHIPPED | OUTPATIENT
Start: 2024-08-09

## 2024-08-22 DIAGNOSIS — Z79.4 TYPE 2 DIABETES MELLITUS WITH OTHER CIRCULATORY COMPLICATION, WITH LONG-TERM CURRENT USE OF INSULIN (H): ICD-10-CM

## 2024-08-22 DIAGNOSIS — E11.59 TYPE 2 DIABETES MELLITUS WITH OTHER CIRCULATORY COMPLICATION, WITH LONG-TERM CURRENT USE OF INSULIN (H): ICD-10-CM

## 2024-08-23 RX ORDER — INSULIN LISPRO 100 [IU]/ML
INJECTION, SOLUTION INTRAVENOUS; SUBCUTANEOUS
Qty: 120 ML | Refills: 0 | Status: SHIPPED | OUTPATIENT
Start: 2024-08-23 | End: 2024-09-09

## 2024-08-23 NOTE — TELEPHONE ENCOUNTER
Requested Prescriptions   Pending Prescriptions Disp Refills    insulin lispro (HUMALOG) 100 UNIT/ML vial [Pharmacy Med Name: HumaLOG 100 UNIT/ML Subcutaneous Solution] 360 mL 3     Sig: INJECT SUBCUTANEOUSLY 400 UNITS  DAILY VIA INSULIN PUMP       Insulin Protocol Failed - 8/22/2024  9:57 PM        Failed - Chart Review Required     Review Chart.    Do not approve if insulin is used in a pump.  Instead, direct refill request to the patient's endocrinologist.  If the patient doesn't have an endocrinologist, then send the refill to the patient's PCP for review            Passed - Medication is active on med list        Passed - Has GFR on file in past 12 months and most recent value is normal        Passed - Recent (6 mo) or future (90 days) visit within the authorizing provider's specialty     The patient must have completed an in-person or virtual visit within the past 6 months or has a future visit scheduled within the next 90 days with the authorizing provider s specialty.  Urgent care and e-visits do not quality as an office visit for this protocol.          Passed - Medication indicated for associated diagnosis     Medication is associated with one or more of the following diagnoses:   - Type 1 diabetes mellitus  - Type 2 diabetes mellitus  - Diabetic nephropathy; Prophylaxis  - Neuropathy due to diabetes mellitus; Prophylaxis  - Retinopathy due to diabetes mellitus; Prophylaxis  - Diabetes mellitus associated with cystic fibrosis  - Disorder of cardiovascular system; Prophylaxis - Type 1 diabetes mellitus   - Disorder of cardiovascular system; Prophylaxis - Type 2 diabetes mellitus            Passed - Patient is 18 years of age or older

## 2024-08-26 ENCOUNTER — LAB (OUTPATIENT)
Dept: LAB | Facility: CLINIC | Age: 74
End: 2024-08-26
Payer: COMMERCIAL

## 2024-08-26 DIAGNOSIS — E11.59 TYPE 2 DIABETES MELLITUS WITH OTHER CIRCULATORY COMPLICATIONS (H): ICD-10-CM

## 2024-08-26 LAB
ANION GAP SERPL CALCULATED.3IONS-SCNC: 10 MMOL/L (ref 7–15)
BUN SERPL-MCNC: 22.6 MG/DL (ref 8–23)
CALCIUM SERPL-MCNC: 8.9 MG/DL (ref 8.8–10.4)
CHLORIDE SERPL-SCNC: 103 MMOL/L (ref 98–107)
CREAT SERPL-MCNC: 1.18 MG/DL (ref 0.67–1.17)
EGFRCR SERPLBLD CKD-EPI 2021: 66 ML/MIN/1.73M2
GLUCOSE SERPL-MCNC: 105 MG/DL (ref 70–99)
HBA1C MFR BLD: 6.4 % (ref 0–5.6)
HCO3 SERPL-SCNC: 27 MMOL/L (ref 22–29)
POTASSIUM SERPL-SCNC: 4.2 MMOL/L (ref 3.4–5.3)
SODIUM SERPL-SCNC: 140 MMOL/L (ref 135–145)

## 2024-08-26 PROCEDURE — 80048 BASIC METABOLIC PNL TOTAL CA: CPT

## 2024-08-26 PROCEDURE — 36415 COLL VENOUS BLD VENIPUNCTURE: CPT

## 2024-08-26 PROCEDURE — 83036 HEMOGLOBIN GLYCOSYLATED A1C: CPT

## 2024-09-04 DIAGNOSIS — E11.59 TYPE 2 DIABETES MELLITUS WITH OTHER CIRCULATORY COMPLICATION, WITH LONG-TERM CURRENT USE OF INSULIN (H): ICD-10-CM

## 2024-09-04 DIAGNOSIS — Z79.4 TYPE 2 DIABETES MELLITUS WITH OTHER CIRCULATORY COMPLICATION, WITH LONG-TERM CURRENT USE OF INSULIN (H): ICD-10-CM

## 2024-09-04 NOTE — TELEPHONE ENCOUNTER
Pt has follow up 9/5    Requested Prescriptions   Pending Prescriptions Disp Refills    TRULICITY 3 MG/0.5ML SOPN [Pharmacy Med Name: Trulicity 3 MG/0.5ML Subcutaneous Solution Pen-injector] 6 mL 3     Sig: INJECT THE CONTENTS OF ONE PEN  SUBCUTANEOUSLY WEEKLY AS  DIRECTED       GLP-1 Agonists Protocol Passed - 9/4/2024  3:48 PM        Passed - HgbA1C in past 3 or 6 months     If HgbA1C is 8 or greater, it needs to be on file within the past 3 months.  If less than 8, must be on file within the past 6 months.     Recent Labs   Lab Test 08/26/24  0728   A1C 6.4*             Passed - Medication is active on med list        Passed - Has GFR on file in past 12 months and most recent value is normal        Passed - Recent (6 mo) or future (90 days) visit within the authorizing provider's specialty     The patient must have completed an in-person or virtual visit within the past 6 months or has a future visit scheduled within the next 90 days with the authorizing provider s specialty.  Urgent care and e-visits do not quality as an office visit for this protocol.          Passed - Medication indicated for associated diagnosis     Medication is associated with one or more of the following diagnoses:     Type 2 diabetes mellitus           Passed - Patient is age 18 or older

## 2024-09-05 ENCOUNTER — OFFICE VISIT (OUTPATIENT)
Dept: ENDOCRINOLOGY | Facility: CLINIC | Age: 74
End: 2024-09-05
Payer: COMMERCIAL

## 2024-09-05 VITALS
HEART RATE: 73 BPM | OXYGEN SATURATION: 94 % | BODY MASS INDEX: 52.13 KG/M2 | SYSTOLIC BLOOD PRESSURE: 150 MMHG | WEIGHT: 315 LBS | DIASTOLIC BLOOD PRESSURE: 81 MMHG

## 2024-09-05 DIAGNOSIS — E11.59 TYPE 2 DIABETES MELLITUS WITH OTHER CIRCULATORY COMPLICATIONS (H): Primary | ICD-10-CM

## 2024-09-05 DIAGNOSIS — E66.01 MORBID OBESITY (H): ICD-10-CM

## 2024-09-05 PROCEDURE — 99214 OFFICE O/P EST MOD 30 MIN: CPT | Performed by: NURSE PRACTITIONER

## 2024-09-05 RX ORDER — DULAGLUTIDE 3 MG/.5ML
INJECTION, SOLUTION SUBCUTANEOUS
Qty: 6 ML | Refills: 1 | Status: SHIPPED | OUTPATIENT
Start: 2024-09-05

## 2024-09-05 NOTE — PROGRESS NOTES
"[facility] ENDOCRINOLOGY    Diabetes Note 9/5/2024    Walt Lambert, 1950, [id number]          Reason for visit      1. Type 2 diabetes mellitus with other circulatory complications (H)    2. Morbid obesity (H)        HPI     Walt Lambert is a very pleasant 73 year old old male who presents for follow up.  SUMMARY:    Alvino is seen today in follow-up for DM 2 and Obesity. His current A1c is 6.4 and up slightly from his previous of 6.0. He continues to use the Tandem insulin pump system with Ivelisse CGM which was downloaded today and data was reviewed.     TIR was 93% on CGM, 98% on the Pump. Above, 7% and no hypoglycemia noted. GMI of 6.5. He is also taking Trulicity, 3 mg weekly.     He reports that he will correct for elevations above 150.     Creatinine remains slightly elevated with mild improvement from his previous.     He reports recent treatment for an area on his R LE. It was treated as Cellulitis with Doxycyline and it cleared up. He reports that the area became very discolored and dark. This has cleared up. He does have Diabetic Dermopathy in the area.     Pt reports that he is doing the Keto diet, \"mostly\". He initially lost 40 lbs, and has now hit a plateau. He is down 20 lbs since March of this year.     BP elevated today. He did report that his back is bothering him and notes that his BG is \"always good\" at home.  He is taking Almodipine and Metoprolol daily.     Blood glucose data:    Insulin Pump Settings     Basal Rate  TIME Units/HR   0000 - 0000 5.0                         Bolus: Insulin : CHO ratio  Time Units Grams CHO   0000 - 0000 1 8                          Correction  #Units Blood glucose >Target BG   1 2 110           Past Medical History     Patient Active Problem List   Diagnosis    Diabetes mellitus (H)    Morbid obesity (H)    Cardiomyopathy (H)    Chronic obstructive pulmonary disease (H)    Hyperlipidemia    Hypertension    Sleep apnea    Insulin pump status    MAHMOOD " (dyspnea on exertion)    Abnormal cardiac CT angiography    CAD (coronary artery disease)    S/P CABG (coronary artery bypass graft)    ARF (acute respiratory failure) (H)    Anemia due to blood loss, acute    Arteriosclerosis of coronary artery bypass graft    Body mass index (BMI) 50.0-59.9, adult    Pure hypertriglyceridemia    Peripheral venous insufficiency    Lymphedema    Chronic kidney disease, stage 1    Hammertoe, bilateral    Onychomycosis    Anhidrosis    Type 2 diabetes mellitus with other circulatory complications (H)    History of colon polyps        Family History       family history includes Colon Cancer in his sister; Heart Failure in his mother; Leukemia in his father and mother; Snoring in his father and mother.    Social History      reports that he has quit smoking. He has never used smokeless tobacco. He reports current alcohol use. He reports that he does not use drugs.      Review of Systems     Patient has no polyuria or polydipsia, no chest pain, dyspnea or TIA's, no numbness, tingling or pain in extremities  Remainder negative except as noted in HPI.    Vital Signs     BP (!) 150/81 (BP Location: Left arm, Patient Position: Sitting, Cuff Size: Adult Large)   Pulse 73   Wt (!) 164.8 kg (363 lb 4.8 oz)   SpO2 94%   BMI 52.13 kg/m    Wt Readings from Last 3 Encounters:   09/05/24 (!) 164.8 kg (363 lb 4.8 oz)   06/07/24 (!) 162.3 kg (357 lb 14.4 oz)   03/01/24 (!) 173.3 kg (382 lb)       Physical Exam     Constitutional:  Well developed, Well nourished  HENT:  Normocephalic,   Neck: Thyroid normal, No lymph nodes, Supple  Eyes:  PERRL, Conjunctiva pink  Respiratory:  Normal breath sounds, No respiratory distress  Cardiovascular:  Normal heart rate, Normal rhythm, No murmurs  GI:  Bowel sounds normal, Soft, No tenderness  Musculoskeletal:  No gross deformity or lesions, normal dorsalis pedis pulses  Skin: No acanthosis nigricans, lipoatrophy or lipodystrophy  Neurologic:  Alert & oriented  x 3, nonfocal  Psychiatric:  Affect, Mood, Insight appropriate      Assessment     1. Type 2 diabetes mellitus with other circulatory complications (H)    2. Morbid obesity (H)        Plan     Alvino's control remains good. No changes to his pump settings warranted today.     Follow-up in 3 months per Medicare Pumping Guidelines.         Emily Alfred NP  [facility] Endocrinology  9/5/2024  8:41 AM      Lab Results     Microalbumin Urine mg/dL   Date Value Ref Range Status   10/02/2020 17.87 (H) 0.00 - 1.99 mg/dL Final       Cholesterol   Date Value Ref Range Status   08/21/2023 118 <200 mg/dL Final     Direct Measure HDL   Date Value Ref Range Status   08/21/2023 38 (L) >=40 mg/dL Final     Triglycerides   Date Value Ref Range Status   08/21/2023 188 (H) <150 mg/dL Final       [unfilled]      Current Medications     Outpatient Medications Prior to Visit   Medication Sig Dispense Refill    ADVAIR DISKUS 250-50 mcg/dose DISKUS [ADVAIR DISKUS 250-50 MCG/DOSE DISKUS] Inhale 1 puff 2 (two) times a day.      albuterol (PROAIR HFA/PROVENTIL HFA/VENTOLIN HFA) 108 (90 Base) MCG/ACT inhaler INHALE 1 PUFF INTO THE LUNGS EVERY 4 HOURS AS NEEDED      amLODIPine (NORVASC) 10 MG tablet Take 10 mg by mouth daily      aspirin 81 MG EC tablet [ASPIRIN 81 MG EC TABLET] Take 81 mg by mouth daily.      atorvastatin (LIPITOR) 80 MG tablet Take 1 tablet (80 mg) by mouth daily 90 tablet 3    bumetanide (BUMEX) 2 MG tablet TAKE 1 TABLET BY MOUTH DAILY 100 tablet 0    Cholecalciferol (VITAMIN D-3) 25 MCG (1000 UT) CAPS 1 capsule      Continuous Blood Gluc Sensor (FREESTYLE ANA M 3 SENSOR) MISC 1 Units every 14 days 6 each 3    Digital Therapy (HELLO HEART BLOOD PRESSURE) KIT       Insulin Infusion Pump Supplies (AUTOSOFT 90 INFUSION SET) MISC 1 each every 3 days 30 each 3    Insulin Infusion Pump Supplies (T:SLIM X2 3ML CARTRIDGE) MISC 1 each every 3 days 30 each 3    insulin lispro (HUMALOG) 100 UNIT/ML vial INJECT SUBCUTANEOUSLY 400 UNITS  DAILY  VIA INSULIN PUMP 120 mL 0    lisinopril (ZESTRIL) 20 MG tablet Take 1 tablet (20 mg) by mouth 2 times daily 180 tablet 3    metoprolol tartrate (LOPRESSOR) 100 MG tablet Take 1 tablet (100 mg) by mouth 2 times daily 180 tablet 3    multivitamin (ONE A DAY) per tablet [MULTIVITAMIN (ONE A DAY) PER TABLET] Take 1 tablet by mouth daily.      omeprazole (PRILOSEC) 20 MG capsule [OMEPRAZOLE (PRILOSEC) 20 MG CAPSULE] Take 20 mg by mouth daily.      triamcinolone (KENALOG) 0.025 % cream [TRIAMCINOLONE (KENALOG) 0.025 % CREAM] Apply 1 application topically 3 (three) times a day as needed.       TRULICITY 3 MG/0.5ML SOPN INJECT THE CONTENTS OF ONE PEN  SUBCUTANEOUSLY WEEKLY AS  DIRECTED 6 mL 0     No facility-administered medications prior to visit.

## 2024-09-05 NOTE — LETTER
"9/5/2024      Walt Lambert  [address] [place] [id number]      Dear Colleague,    Thank you for referring your patient, Walt Lambert, to the [facility] [place]. Please see a copy of my visit note below.    [facility]    Diabetes Note 9/5/2024    Walt Lambert, 1950, [id number]          Reason for visit      1. Type 2 diabetes mellitus with other circulatory complications (H)    2. Morbid obesity (H)        HPI     Walt Lambert is a very pleasant 73 year old old male who presents for follow up.  SUMMARY:    Alvino is seen today in follow-up for DM 2 and Obesity. His current A1c is 6.4 and up slightly from his previous of 6.0. He continues to use the Tandem insulin pump system with Ivelisse CGM which was downloaded today and data was reviewed.     TIR was 93% on CGM, 98% on the Pump. Above, 7% and no hypoglycemia noted. GMI of 6.5. He is also taking Trulicity, 3 mg weekly.     He reports that he will correct for elevations above 150.     Creatinine remains slightly elevated with mild improvement from his previous.     He reports recent treatment for an area on his R LE. It was treated as Cellulitis with Doxycyline and it cleared up. He reports that the area became very discolored and dark. This has cleared up. He does have Diabetic Dermopathy in the area.     Pt reports that he is doing the Keto diet, \"mostly\". He initially lost 40 lbs, and has now hit a plateau. He is down 20 lbs since March of this year.     BP elevated today. He did report that his back is bothering him and notes that his BG is \"always good\" at home.  He is taking Almodipine and Metoprolol daily.     Blood glucose data:    Insulin Pump Settings     Basal Rate  TIME Units/HR   0000 - 0000 5.0                         Bolus: Insulin : CHO ratio  Time Units Grams CHO   0000 - 0000 1 8                          Correction  #Units Blood glucose >Target BG   1 2 110 "           Past Medical History     Patient Active Problem List   Diagnosis     Diabetes mellitus (H)     Morbid obesity (H)     Cardiomyopathy (H)     Chronic obstructive pulmonary disease (H)     Hyperlipidemia     Hypertension     Sleep apnea     Insulin pump status     MAHMOOD (dyspnea on exertion)     Abnormal cardiac CT angiography     CAD (coronary artery disease)     S/P CABG (coronary artery bypass graft)     ARF (acute respiratory failure) (H)     Anemia due to blood loss, acute     Arteriosclerosis of coronary artery bypass graft     Body mass index (BMI) 50.0-59.9, adult     Pure hypertriglyceridemia     Peripheral venous insufficiency     Lymphedema     Chronic kidney disease, stage 1     Hammertoe, bilateral     Onychomycosis     Anhidrosis     Type 2 diabetes mellitus with other circulatory complications (H)     History of colon polyps        Family History       family history includes Colon Cancer in his sister; Heart Failure in his mother; Leukemia in his father and mother; Snoring in his father and mother.    Social History      reports that he has quit smoking. He has never used smokeless tobacco. He reports current alcohol use. He reports that he does not use drugs.      Review of Systems     Patient has no polyuria or polydipsia, no chest pain, dyspnea or TIA's, no numbness, tingling or pain in extremities  Remainder negative except as noted in HPI.    Vital Signs     BP (!) 150/81 (BP Location: Left arm, Patient Position: Sitting, Cuff Size: Adult Large)   Pulse 73   Wt (!) 164.8 kg (363 lb 4.8 oz)   SpO2 94%   BMI 52.13 kg/m    Wt Readings from Last 3 Encounters:   09/05/24 (!) 164.8 kg (363 lb 4.8 oz)   06/07/24 (!) 162.3 kg (357 lb 14.4 oz)   03/01/24 (!) 173.3 kg (382 lb)       Physical Exam     Constitutional:  Well developed, Well nourished  HENT:  Normocephalic,   Neck: Thyroid normal, No lymph nodes, Supple  Eyes:  PERRL, Conjunctiva pink  Respiratory:  Normal breath sounds, No  respiratory distress  Cardiovascular:  Normal heart rate, Normal rhythm, No murmurs  GI:  Bowel sounds normal, Soft, No tenderness  Musculoskeletal:  No gross deformity or lesions, normal dorsalis pedis pulses  Skin: No acanthosis nigricans, lipoatrophy or lipodystrophy  Neurologic:  Alert & oriented x 3, nonfocal  Psychiatric:  Affect, Mood, Insight appropriate      Assessment     1. Type 2 diabetes mellitus with other circulatory complications (H)    2. Morbid obesity (H)        Plan     Alvino's control remains good. No changes to his pump settings warranted today.     Follow-up in 3 months per Medicare Pumping Guidelines.         Emily Alfred NP  [facility]  9/5/2024  8:41 AM      Lab Results     Microalbumin Urine mg/dL   Date Value Ref Range Status   10/02/2020 17.87 (H) 0.00 - 1.99 mg/dL Final       Cholesterol   Date Value Ref Range Status   08/21/2023 118 <200 mg/dL Final     Direct Measure HDL   Date Value Ref Range Status   08/21/2023 38 (L) >=40 mg/dL Final     Triglycerides   Date Value Ref Range Status   08/21/2023 188 (H) <150 mg/dL Final       [unfilled]      Current Medications     Outpatient Medications Prior to Visit   Medication Sig Dispense Refill     ADVAIR DISKUS 250-50 mcg/dose DISKUS [ADVAIR DISKUS 250-50 MCG/DOSE DISKUS] Inhale 1 puff 2 (two) times a day.       albuterol (PROAIR HFA/PROVENTIL HFA/VENTOLIN HFA) 108 (90 Base) MCG/ACT inhaler INHALE 1 PUFF INTO THE LUNGS EVERY 4 HOURS AS NEEDED       amLODIPine (NORVASC) 10 MG tablet Take 10 mg by mouth daily       aspirin 81 MG EC tablet [ASPIRIN 81 MG EC TABLET] Take 81 mg by mouth daily.       atorvastatin (LIPITOR) 80 MG tablet Take 1 tablet (80 mg) by mouth daily 90 tablet 3     bumetanide (BUMEX) 2 MG tablet TAKE 1 TABLET BY MOUTH DAILY 100 tablet 0     Cholecalciferol (VITAMIN D-3) 25 MCG (1000 UT) CAPS 1 capsule       Continuous Blood Gluc Sensor (FREESTYLE ANA M 3 SENSOR) MISC 1 Units every 14 days 6 each 3     Digital Therapy  ([facility] HEART BLOOD PRESSURE) KIT        Insulin Infusion Pump Supplies (AUTOSOFT 90 INFUSION SET) MISC 1 each every 3 days 30 each 3     Insulin Infusion Pump Supplies (T:SLIM X2 3ML CARTRIDGE) MISC 1 each every 3 days 30 each 3     insulin lispro (HUMALOG) 100 UNIT/ML vial INJECT SUBCUTANEOUSLY 400 UNITS  DAILY VIA INSULIN PUMP 120 mL 0     lisinopril (ZESTRIL) 20 MG tablet Take 1 tablet (20 mg) by mouth 2 times daily 180 tablet 3     metoprolol tartrate (LOPRESSOR) 100 MG tablet Take 1 tablet (100 mg) by mouth 2 times daily 180 tablet 3     multivitamin (ONE A DAY) per tablet [MULTIVITAMIN (ONE A DAY) PER TABLET] Take 1 tablet by mouth daily.       omeprazole (PRILOSEC) 20 MG capsule [OMEPRAZOLE (PRILOSEC) 20 MG CAPSULE] Take 20 mg by mouth daily.       triamcinolone (KENALOG) 0.025 % cream [TRIAMCINOLONE (KENALOG) 0.025 % CREAM] Apply 1 application topically 3 (three) times a day as needed.        TRULICITY 3 MG/0.5ML SOPN INJECT THE CONTENTS OF ONE PEN  SUBCUTANEOUSLY WEEKLY AS  DIRECTED 6 mL 0     No facility-administered medications prior to visit.                     Again, thank you for allowing me to participate in the care of your patient.        Sincerely,        Emily Alfred NP

## 2024-09-06 DIAGNOSIS — E11.59 TYPE 2 DIABETES MELLITUS WITH OTHER CIRCULATORY COMPLICATION, WITH LONG-TERM CURRENT USE OF INSULIN (H): ICD-10-CM

## 2024-09-06 DIAGNOSIS — Z79.4 TYPE 2 DIABETES MELLITUS WITH OTHER CIRCULATORY COMPLICATION, WITH LONG-TERM CURRENT USE OF INSULIN (H): ICD-10-CM

## 2024-09-07 DIAGNOSIS — Z79.4 TYPE 2 DIABETES MELLITUS WITH OTHER CIRCULATORY COMPLICATION, WITH LONG-TERM CURRENT USE OF INSULIN (H): ICD-10-CM

## 2024-09-07 DIAGNOSIS — E11.59 TYPE 2 DIABETES MELLITUS WITH OTHER CIRCULATORY COMPLICATION, WITH LONG-TERM CURRENT USE OF INSULIN (H): ICD-10-CM

## 2024-09-09 RX ORDER — INSULIN LISPRO 100 [IU]/ML
INJECTION, SOLUTION INTRAVENOUS; SUBCUTANEOUS
Qty: 120 ML | Refills: 2 | Status: SHIPPED | OUTPATIENT
Start: 2024-09-09

## 2024-09-09 RX ORDER — BLOOD-GLUCOSE SENSOR
EACH MISCELLANEOUS
Refills: 2 | OUTPATIENT
Start: 2024-09-09

## 2024-09-09 NOTE — TELEPHONE ENCOUNTER
One year supply sent 11/30/23    Requested Prescriptions   Pending Prescriptions Disp Refills    Continuous Glucose Sensor (FREESTYLE ANA M 3 SENSOR) MISC [Pharmacy Med Name: FREESTYLE LIBRE_3 SENSOR]  2     Sig: USE 1 SENSOR EVERY 14 DAYS       There is no refill protocol information for this order

## 2024-09-09 NOTE — TELEPHONE ENCOUNTER
Requested Prescriptions   Pending Prescriptions Disp Refills    insulin lispro (HUMALOG) 100 UNIT/ML vial [Pharmacy Med Name: HumaLOG 100 UNIT/ML Subcutaneous Solution] 120 mL 11     Sig: INJECT SUBCUTANEOUSLY 400 UNITS  DAILY VIA INSULIN PUMP       Insulin Protocol Failed - 9/6/2024  9:34 PM        Failed - Chart Review Required     Review Chart.    Do not approve if insulin is used in a pump.  Instead, direct refill request to the patient's endocrinologist.  If the patient doesn't have an endocrinologist, then send the refill to the patient's PCP for review            Passed - Medication is active on med list        Passed - Has GFR on file in past 12 months and most recent value is normal        Passed - Recent (6 mo) or future (90 days) visit within the authorizing provider's specialty     The patient must have completed an in-person or virtual visit within the past 6 months or has a future visit scheduled within the next 90 days with the authorizing provider s specialty.  Urgent care and e-visits do not quality as an office visit for this protocol.          Passed - Medication indicated for associated diagnosis     Medication is associated with one or more of the following diagnoses:   - Type 1 diabetes mellitus  - Type 2 diabetes mellitus  - Diabetic nephropathy; Prophylaxis  - Neuropathy due to diabetes mellitus; Prophylaxis  - Retinopathy due to diabetes mellitus; Prophylaxis  - Diabetes mellitus associated with cystic fibrosis  - Disorder of cardiovascular system; Prophylaxis - Type 1 diabetes mellitus   - Disorder of cardiovascular system; Prophylaxis - Type 2 diabetes mellitus            Passed - Patient is 18 years of age or older

## 2024-09-16 ENCOUNTER — LAB REQUISITION (OUTPATIENT)
Dept: LAB | Facility: CLINIC | Age: 74
End: 2024-09-16
Payer: COMMERCIAL

## 2024-09-16 ENCOUNTER — TRANSFERRED RECORDS (OUTPATIENT)
Dept: HEALTH INFORMATION MANAGEMENT | Facility: CLINIC | Age: 74
End: 2024-09-16

## 2024-09-16 DIAGNOSIS — E78.1 PURE HYPERGLYCERIDEMIA: ICD-10-CM

## 2024-09-16 DIAGNOSIS — E11.59 TYPE 2 DIABETES MELLITUS WITH OTHER CIRCULATORY COMPLICATIONS (H): ICD-10-CM

## 2024-09-16 LAB
ERYTHROCYTE [DISTWIDTH] IN BLOOD BY AUTOMATED COUNT: 14.4 % (ref 10–15)
HCT VFR BLD AUTO: 46.1 % (ref 40–53)
HGB BLD-MCNC: 15 G/DL (ref 13.3–17.7)
MCH RBC QN AUTO: 29.5 PG (ref 26.5–33)
MCHC RBC AUTO-ENTMCNC: 32.5 G/DL (ref 31.5–36.5)
MCV RBC AUTO: 91 FL (ref 78–100)
PLATELET # BLD AUTO: 228 10E3/UL (ref 150–450)
RBC # BLD AUTO: 5.09 10E6/UL (ref 4.4–5.9)
WBC # BLD AUTO: 10.6 10E3/UL (ref 4–11)

## 2024-09-16 PROCEDURE — 85027 COMPLETE CBC AUTOMATED: CPT | Mod: ORL | Performed by: FAMILY MEDICINE

## 2024-09-16 PROCEDURE — 80053 COMPREHEN METABOLIC PANEL: CPT | Mod: ORL | Performed by: FAMILY MEDICINE

## 2024-09-16 PROCEDURE — 82043 UR ALBUMIN QUANTITATIVE: CPT | Mod: ORL | Performed by: FAMILY MEDICINE

## 2024-09-16 PROCEDURE — 80061 LIPID PANEL: CPT | Mod: ORL | Performed by: FAMILY MEDICINE

## 2024-09-17 ENCOUNTER — OFFICE VISIT (OUTPATIENT)
Dept: PULMONOLOGY | Facility: CLINIC | Age: 74
End: 2024-09-17
Attending: NURSE PRACTITIONER
Payer: COMMERCIAL

## 2024-09-17 VITALS
OXYGEN SATURATION: 96 % | WEIGHT: 315 LBS | SYSTOLIC BLOOD PRESSURE: 134 MMHG | DIASTOLIC BLOOD PRESSURE: 72 MMHG | HEART RATE: 73 BPM | BODY MASS INDEX: 51.68 KG/M2

## 2024-09-17 DIAGNOSIS — J45.30 MILD PERSISTENT ASTHMA WITHOUT COMPLICATION: Primary | ICD-10-CM

## 2024-09-17 LAB
ALBUMIN SERPL BCG-MCNC: 4.1 G/DL (ref 3.5–5.2)
ALP SERPL-CCNC: 106 U/L (ref 40–150)
ALT SERPL W P-5'-P-CCNC: 68 U/L (ref 0–70)
ANION GAP SERPL CALCULATED.3IONS-SCNC: 14 MMOL/L (ref 7–15)
AST SERPL W P-5'-P-CCNC: 49 U/L (ref 0–45)
BILIRUB SERPL-MCNC: 1.5 MG/DL
BUN SERPL-MCNC: 21 MG/DL (ref 8–23)
CALCIUM SERPL-MCNC: 8.9 MG/DL (ref 8.8–10.4)
CHLORIDE SERPL-SCNC: 103 MMOL/L (ref 98–107)
CHOLEST SERPL-MCNC: 100 MG/DL
CREAT SERPL-MCNC: 1.32 MG/DL (ref 0.67–1.17)
CREAT UR-MCNC: 169 MG/DL
EGFRCR SERPLBLD CKD-EPI 2021: 58 ML/MIN/1.73M2
FASTING STATUS PATIENT QL REPORTED: ABNORMAL
FASTING STATUS PATIENT QL REPORTED: ABNORMAL
GLUCOSE SERPL-MCNC: 82 MG/DL (ref 70–99)
HCO3 SERPL-SCNC: 26 MMOL/L (ref 22–29)
HDLC SERPL-MCNC: 35 MG/DL
LDLC SERPL CALC-MCNC: 34 MG/DL
MICROALBUMIN UR-MCNC: 116 MG/L
MICROALBUMIN/CREAT UR: 68.64 MG/G CR (ref 0–17)
NONHDLC SERPL-MCNC: 65 MG/DL
POTASSIUM SERPL-SCNC: 3.9 MMOL/L (ref 3.4–5.3)
PROT SERPL-MCNC: 6.9 G/DL (ref 6.4–8.3)
SODIUM SERPL-SCNC: 143 MMOL/L (ref 135–145)
TRIGL SERPL-MCNC: 154 MG/DL

## 2024-09-17 PROCEDURE — 99214 OFFICE O/P EST MOD 30 MIN: CPT | Performed by: NURSE PRACTITIONER

## 2024-09-17 RX ORDER — FLUTICASONE PROPIONATE AND SALMETEROL 250; 50 UG/1; UG/1
1 POWDER RESPIRATORY (INHALATION) 2 TIMES DAILY
Qty: 60 EACH | Refills: 11 | Status: SHIPPED | OUTPATIENT
Start: 2024-09-17

## 2024-09-17 ASSESSMENT — ASTHMA QUESTIONNAIRES
QUESTION_4 LAST FOUR WEEKS HOW OFTEN HAVE YOU USED YOUR RESCUE INHALER OR NEBULIZER MEDICATION (SUCH AS ALBUTEROL): ONE OR TWO TIMES PER DAY
QUESTION_3 LAST FOUR WEEKS HOW OFTEN DID YOUR ASTHMA SYMPTOMS (WHEEZING, COUGHING, SHORTNESS OF BREATH, CHEST TIGHTNESS OR PAIN) WAKE YOU UP AT NIGHT OR EARLIER THAN USUAL IN THE MORNING: NOT AT ALL
QUESTION_1 LAST FOUR WEEKS HOW MUCH OF THE TIME DID YOUR ASTHMA KEEP YOU FROM GETTING AS MUCH DONE AT WORK, SCHOOL OR AT HOME: NONE OF THE TIME
QUESTION_2 LAST FOUR WEEKS HOW OFTEN HAVE YOU HAD SHORTNESS OF BREATH: MORE THAN ONCE A DAY
ACT_TOTALSCORE: 17
QUESTION_5 LAST FOUR WEEKS HOW WOULD YOU RATE YOUR ASTHMA CONTROL: WELL CONTROLLED
ACT_TOTALSCORE: 17

## 2024-09-17 NOTE — PATIENT INSTRUCTIONS
It was a pleasure to see you in clinic today.   Here is what we discussed:    Continue Advair one puff twice daily, rinse/gargle after use.   Continue Albuterol every 4-6 hours as needed for shortness of breath or wheezing.  Consider the RSV vaccine this fall.   Call my nurse, David ([phone]) with any change or worsening of your breathing.  Follow-up in one year.     Rosanna Goodwin, CNP  Pulmonary Medicine  [facility]  [phone]

## 2024-09-17 NOTE — PROGRESS NOTES
Pulmonary Clinic Follow-Up            Assessment/Plan:     Walt Lambert is a 73 year old male with a past medical history significant for Afib, CAD, YANE, presumed asthma who presents to clinic today for annual follow up.      Dyspnea on exertion  Mild persistent asthma  Patient originally presented to our clinic in 8/2022 for evaluation of dyspnea on exertion.  Pulmonary function testing with mild obstruction (FEV1 69%) and air-trapping, with reversibility after bronchodilator.  6 minute walk test without desaturations.  Patient's BMI is more than 50.  Cause of dyspnea was thought to be most likely multifactorial secondary to morbid obesity, deconditioning, cardiac history, and asthma.  He reports stable breathing since last visit, he did try to wean off Advair but noticed difficulty taking deep breaths, so he started up again.  Over the past year his breathing has been stable.  No URIs or exacerbations.    Plan:  - continue Advair Diskus (fluticasone/salmeterol) 250/50, one inhalation BID, rinse/gargle after use.  - continue cpap at night and with naps, follow with sleep medicine as ordered  - continue to stay active and increase activity as able  - offered referral to pulmonary rehab, he previously declined  - he is UTD with COVID  booster, pneumococcal vaccine, and annual influenza vaccine.  Recommended RSV vaccine.  - Action plan: prednisone 40mg x5 days    Follow-up  - annually      Rosanna Goodwin CNP  Pulmonary Medicine  [facility]  [phone]         CC:     Dyspnea follow-up     HPI:     Walt Lambert is a 73 year old male with a past medical history significant for Afib, CAD, YANE, presumed asthma who presents to clinic today for annual follow up.      Continues on Advair, doesn't rinse mouth.  Albuterol use almost never.  No bad illnesses, did not require prednisone or antibiotics over past year.  Trying to lose weight, did lose 60 lbs but going back up  again.        9/17/2024     9:00 AM   ACT Total Scores   ACT TOTAL SCORE (Goal Greater than or Equal to 20) 17   In the past 12 months, how many times did you visit the emergency room for your asthma without being admitted to the hospital? 0   In the past 12 months, how many times were you hospitalized overnight because of your asthma? 0          ROS:     10-point ROS performed and is negative aside from those listed in HPI.     Medical history:         PMHx:  Past Medical History:   Diagnosis Date    Asthma     Chronic obstructive pulmonary disease (H) 9/29/2019    Diabetes mellitus (H)     insulin pump    Diabetic neuropathy (H)     GERD (gastroesophageal reflux disease)     Hyperlipidemia     Hypertension     Morbid obesity (H)     Sleep apnea     uses CPAP     Past Surgical History:   Procedure Laterality Date    COLONOSCOPY N/A 9/20/2017    Procedure: COLONOSCOPY;  Surgeon: Michael Fagan MD;  Location: [facility];  Service:     COLONOSCOPY N/A 11/16/2023    Procedure: COLONOSCOPY WITH POLYPECTOMY;  Surgeon: Lopez Huggins MD;  Location: [facility] OR    CV CORONARY ANGIOGRAM N/A 2/19/2020    Procedure: Coronary Angiogram;  Surgeon: Daniel Hayden MD;  Location: [facility] Cath Lab;  Service: Cardiology    CV LEFT HEART CATHETERIZATION WITH LEFT VENTRICULOGRAM N/A 2/19/2020    Procedure: Left Heart Catheterization with Left Ventriculogram;  Surgeon: Daniel Hayden MD;  Location: [facility] Cath Lab;  Service: Cardiology    GALLBLADDER SURGERY           Social history  Social History     Tobacco Use    Smoking status: Former     Passive exposure: Past    Smokeless tobacco: Never    Tobacco comments:     quit 40 years ago   Vaping Use    Vaping status: Never Used   Substance Use Topics    Alcohol use: Yes     Comment: Alcoholic Drinks/day: one beer every two weeks     Drug use: No   Ex smoker  Retired. Used to be  for 3 M.     Current Outpatient Medications   Medication Sig Dispense  Refill    ADVAIR DISKUS 250-50 mcg/dose DISKUS [ADVAIR DISKUS 250-50 MCG/DOSE DISKUS] Inhale 1 puff 2 (two) times a day.      albuterol (PROAIR HFA/PROVENTIL HFA/VENTOLIN HFA) 108 (90 Base) MCG/ACT inhaler INHALE 1 PUFF INTO THE LUNGS EVERY 4 HOURS AS NEEDED      aspirin 81 MG EC tablet [ASPIRIN 81 MG EC TABLET] Take 81 mg by mouth daily.      atorvastatin (LIPITOR) 80 MG tablet Take 1 tablet (80 mg) by mouth daily 90 tablet 3    bumetanide (BUMEX) 2 MG tablet TAKE 1 TABLET BY MOUTH DAILY 100 tablet 0    Cholecalciferol (VITAMIN D-3) 25 MCG (1000 UT) CAPS 1 capsule      Continuous Blood Gluc Sensor (FREESTYLE ANA M 3 SENSOR) MISC 1 Units every 14 days 6 each 3    Digital Therapy (HELLO HEART BLOOD PRESSURE) KIT       Insulin Infusion Pump Supplies (AUTOSOFT 90 INFUSION SET) MISC 1 each every 3 days 30 each 3    Insulin Infusion Pump Supplies (T:SLIM X2 3ML CARTRIDGE) MISC 1 each every 3 days 30 each 3    insulin lispro (HUMALOG) 100 UNIT/ML vial INJECT SUBCUTANEOUSLY 400 UNITS  DAILY VIA INSULIN PUMP 120 mL 2    lisinopril (ZESTRIL) 20 MG tablet Take 1 tablet (20 mg) by mouth 2 times daily 180 tablet 3    metoprolol tartrate (LOPRESSOR) 100 MG tablet Take 1 tablet (100 mg) by mouth 2 times daily 180 tablet 3    multivitamin (ONE A DAY) per tablet [MULTIVITAMIN (ONE A DAY) PER TABLET] Take 1 tablet by mouth daily.      omeprazole (PRILOSEC) 20 MG capsule [OMEPRAZOLE (PRILOSEC) 20 MG CAPSULE] Take 20 mg by mouth daily.      triamcinolone (KENALOG) 0.025 % cream [TRIAMCINOLONE (KENALOG) 0.025 % CREAM] Apply 1 application topically 3 (three) times a day as needed.       TRULICITY 3 MG/0.5ML SOPN INJECT THE CONTENTS OF ONE PEN  SUBCUTANEOUSLY WEEKLY AS  DIRECTED 6 mL 1    amLODIPine (NORVASC) 10 MG tablet Take 10 mg by mouth daily       No current facility-administered medications for this visit.          Data:       Labs/tests/Imaging results:    I have personally reviewed all pertinent imaging studies and PFT results  unless otherwise noted.    PFT's on 8/2022:  Although the FEV1/FVC ratio is > LLN, the appearance of the flow volume curve and the reduction in FEV1 with bronchodilator response indicates at least mild obstruction with reversibility.   Air trapping is present.   Diffusing capacity for CO is normal.     Echo 6/2022:  1. The LV is normal in cavity size and wall thickness. The global systolic function is normal. The LVEF is 71%. There are no regional wall motion abnormalities.  2. The RV is normal in cavity size. The global systolic function is normal. The RVEF is 71%.   3. Mild left atrial enlargement, right atrium is normal in size.  4. There is no significant valvular disease.   5. Late gadolinium enhancement imaging shows a focus of mesocardial LGE in the basal anterior segment which  is a nonischemic pattern. Otherwise, no MI, fibrosis or infiltrative disease.   6. There is no pericardial effusion or thickening. No evidence of ventricular interdependence. No  pericardial adhesion on grid tagging.  7. There is no intracardiac thrombus.  8. T2 weighted imaging does not show convincing evidence of edema or inflammation, note  T2wi are slightly compromised by aliasing artifact      CONCLUSIONS:  Normal biventricular function with LVEF and RVEF of 71%. Focal mesocardial LGE in basal  anterior wall in a nonischemic pattern. Otherwise, no MI, fibrosis or infiltrative disease. No evidence of  pericarditis. No definite evidence of edema or acute inflammation on T2 weighted images.     CXR on 3/2020:     IMPRESSION:  Large lung volumes; correlate for obstructive lung disease. Minimal basilar atelectasis / scarring. No focal consolidation. No pulmonary edema. No definitive pleural effusion. No pneumothorax. Stable mildly enlarged cardiac silhouette. Prior   sternotomy and CABG.      Current Meds:  Current Outpatient Medications   Medication Sig Dispense Refill    ADVAIR DISKUS 250-50 mcg/dose DISKUS [ADVAIR DISKUS 250-50  MCG/DOSE DISKUS] Inhale 1 puff 2 (two) times a day.      albuterol (PROAIR HFA/PROVENTIL HFA/VENTOLIN HFA) 108 (90 Base) MCG/ACT inhaler INHALE 1 PUFF INTO THE LUNGS EVERY 4 HOURS AS NEEDED      aspirin 81 MG EC tablet [ASPIRIN 81 MG EC TABLET] Take 81 mg by mouth daily.      atorvastatin (LIPITOR) 80 MG tablet Take 1 tablet (80 mg) by mouth daily 90 tablet 3    bumetanide (BUMEX) 2 MG tablet TAKE 1 TABLET BY MOUTH DAILY 100 tablet 0    Cholecalciferol (VITAMIN D-3) 25 MCG (1000 UT) CAPS 1 capsule      Continuous Blood Gluc Sensor (FREESTYLE ANA M 3 SENSOR) MISC 1 Units every 14 days 6 each 3    Digital Therapy (HELLO HEART BLOOD PRESSURE) KIT       Insulin Infusion Pump Supplies (AUTOSOFT 90 INFUSION SET) MISC 1 each every 3 days 30 each 3    Insulin Infusion Pump Supplies (T:SLIM X2 3ML CARTRIDGE) MISC 1 each every 3 days 30 each 3    insulin lispro (HUMALOG) 100 UNIT/ML vial INJECT SUBCUTANEOUSLY 400 UNITS  DAILY VIA INSULIN PUMP 120 mL 2    lisinopril (ZESTRIL) 20 MG tablet Take 1 tablet (20 mg) by mouth 2 times daily 180 tablet 3    metoprolol tartrate (LOPRESSOR) 100 MG tablet Take 1 tablet (100 mg) by mouth 2 times daily 180 tablet 3    multivitamin (ONE A DAY) per tablet [MULTIVITAMIN (ONE A DAY) PER TABLET] Take 1 tablet by mouth daily.      omeprazole (PRILOSEC) 20 MG capsule [OMEPRAZOLE (PRILOSEC) 20 MG CAPSULE] Take 20 mg by mouth daily.      triamcinolone (KENALOG) 0.025 % cream [TRIAMCINOLONE (KENALOG) 0.025 % CREAM] Apply 1 application topically 3 (three) times a day as needed.       TRULICITY 3 MG/0.5ML SOPN INJECT THE CONTENTS OF ONE PEN  SUBCUTANEOUSLY WEEKLY AS  DIRECTED 6 mL 1    amLODIPine (NORVASC) 10 MG tablet Take 10 mg by mouth daily         Labs:  @richie@  Lab Results   Component Value Date    WBC 10.6 09/16/2024    HGB 15.0 09/16/2024    HCT 46.1 09/16/2024     09/16/2024     09/16/2024    POTASSIUM 3.9 09/16/2024    CHLORIDE 103 09/16/2024    CO2 26 09/16/2024    GLC 82  09/16/2024    BUN 21.0 09/16/2024    CR 1.32 (H) 09/16/2024    MAG 2.0 03/02/2020    INR 1.42 (H) 02/27/2020    BILITOTAL 1.5 (H) 09/16/2024    AST 49 (H) 09/16/2024    ALT 68 09/16/2024    ALKPHOS 106 09/16/2024    PROTTOTAL 6.9 09/16/2024    ALBUMIN 4.1 09/16/2024        Physical Exam:       /72 (BP Location: Left arm, Patient Position: Sitting, Cuff Size: Adult Large)   Pulse 73   Wt (!) 163.4 kg (360 lb 3.2 oz)   SpO2 96%   BMI 51.68 kg/m      Gen: adult male, obese, appears in NAD  HEENT: clear conjunctivae, moist mucous membranes  CV: RRR, no M/G/R  Resp: CTAB, no focal crackles or wheezes.  Respirations even and unlabored.  On RA.  No cough.  Skin: no apparent rashes on visible skin  Ext: no cyanosis, clubbing or edema  Neuro: alert and answering questions appropriately

## 2024-09-28 ENCOUNTER — HEALTH MAINTENANCE LETTER (OUTPATIENT)
Age: 74
End: 2024-09-28

## 2024-10-18 DIAGNOSIS — R06.09 DOE (DYSPNEA ON EXERTION): ICD-10-CM

## 2024-10-22 RX ORDER — BUMETANIDE 2 MG/1
2 TABLET ORAL DAILY
Qty: 100 TABLET | Refills: 3 | Status: SHIPPED | OUTPATIENT
Start: 2024-10-22

## 2024-11-13 ENCOUNTER — OFFICE VISIT (OUTPATIENT)
Dept: CARDIOLOGY | Facility: CLINIC | Age: 74
End: 2024-11-13
Payer: COMMERCIAL

## 2024-11-13 VITALS
HEART RATE: 79 BPM | DIASTOLIC BLOOD PRESSURE: 80 MMHG | BODY MASS INDEX: 52.52 KG/M2 | RESPIRATION RATE: 24 BRPM | SYSTOLIC BLOOD PRESSURE: 138 MMHG | WEIGHT: 315 LBS

## 2024-11-13 DIAGNOSIS — E66.813 CLASS 3 SEVERE OBESITY DUE TO EXCESS CALORIES WITH SERIOUS COMORBIDITY AND BODY MASS INDEX (BMI) OF 50.0 TO 59.9 IN ADULT (H): ICD-10-CM

## 2024-11-13 DIAGNOSIS — E66.01 CLASS 3 SEVERE OBESITY DUE TO EXCESS CALORIES WITH SERIOUS COMORBIDITY AND BODY MASS INDEX (BMI) OF 50.0 TO 59.9 IN ADULT (H): ICD-10-CM

## 2024-11-13 DIAGNOSIS — I25.10 CORONARY ARTERY DISEASE INVOLVING NATIVE CORONARY ARTERY OF NATIVE HEART WITHOUT ANGINA PECTORIS: Primary | ICD-10-CM

## 2024-11-13 DIAGNOSIS — Z79.4 TYPE 2 DIABETES MELLITUS WITH DIABETIC POLYNEUROPATHY, WITH LONG-TERM CURRENT USE OF INSULIN (H): ICD-10-CM

## 2024-11-13 DIAGNOSIS — Z95.1 S/P CABG (CORONARY ARTERY BYPASS GRAFT): ICD-10-CM

## 2024-11-13 DIAGNOSIS — E11.42 TYPE 2 DIABETES MELLITUS WITH DIABETIC POLYNEUROPATHY, WITH LONG-TERM CURRENT USE OF INSULIN (H): ICD-10-CM

## 2024-11-13 PROCEDURE — 99214 OFFICE O/P EST MOD 30 MIN: CPT | Performed by: INTERNAL MEDICINE

## 2024-11-13 PROCEDURE — G2211 COMPLEX E/M VISIT ADD ON: HCPCS | Performed by: INTERNAL MEDICINE

## 2024-11-13 NOTE — PROGRESS NOTES
[facility] HEART C[facility]   [address]  [place] [address]   [email or web address]   OFFICE: [phone]     CARDIOLOGY CLINIC NOTE     Thank you, Piyush Reeves, for asking the [facility] Heart Care team to see Mr. Walt Lambert to evaluate Follow Up        Assessment/Recommendations   Assessment:    Coronary artery disease s/p CABG (LIMA to LAD, L radial artery to PDA, SVG to OM and SVG to Diagonal branch) by Dr. Russell on 2/26/2020 - stable without angina.  Hypertension - controlled.  Hyperlipidemia - on appropriate statin  Morbid obesity due to excess calories with comorbid conditions and BMI 52 - uncontrolled, but his weight has decreased some over the past year.  Insulin dependent DMII - much better controlled with A1C of 6.4  Chronic dyspnea on exertion - multifactorial due to deconditioning weight, HFpEF, and asthma. Also follows in pulmonary clinic.    Plan:    Continue medications without changes  Advised/reinforced lifestyle changes through diet/exercise to lose weight (he is currently following a ketogenic diet)  Follow up in a year or sooner if needed    The longitudinal plan of care for the diagnosis(es)/condition(s) as documented were addressed during this visit. Due to the added complexity in care, I will continue to support Alvino in the subsequent management and with ongoing continuity of care.         History of Present Illness   Mr. Walt Lambert is a 73 year old male who presents for follow-up of his coronary artery disease.     Mr. Lambert had a coronary artery bypass graft surgery in early February, 2020. This was performed after a stress test ordered for dyspnea on exertion was suggestive of ischemia vs artifact and a CTA coronary angiogram revealed severely elevated CAC and multivessel CAD. He has chronic dyspnea on exertion that is multifactorial and due to morbid obesity, deconditioning, HFpEF, and asthma.     Currently, Alvino is feeling  generally well. He started (mostly) following a ketogenic diet and has noted an improvement in diabetes control as well as some weight loss. He continues to have chronic MAHMOOD but is overall more active now than a year ago.      Other than noted above, Mr. Lambert denies any chest pain/pressure/tightness, shortness of breath at rest or with exertion, light headedness/dizziness, pre-syncope, syncope, lower extremity swelling, palpitations, paroxysmal nocturnal dyspnea (PND), or orthopnea.     Cardiac Problems and Cardiac Diagnostics     Most Recent Cardiac testing:    Cardiac MRI 6/27/22  CONCLUSIONS:  Normal biventricular function with LVEF and RVEF of 71%. Focal mesocardial LGE in basal  anterior wall in a nonischemic pattern. Otherwise, no MI, fibrosis or infiltrative disease. No evidence of  pericarditis. No definite evidence of edema or acute inflammation on T2 weighted images.     TTE 2/6/2020  1. Normal left ventricular size and systolic performance with a visually estimated ejection fraction of 65%.   2. There is mild to moderate concentric increase in left ventricular wall thickness.   3. No significant valvular heart disease is identified on this study.   4. Normal right ventricular size and systolic performance.      Stress test: dated 12/24/2019 revealed    The nuclear stress test is abnormal.    There is a small area of mild ischemia in the inferior segment(s) of the left ventricle. Cannot exclude artifact.    The left ventricular ejection fraction at stress is 61%.    A prior study was conducted on 3/10/2015.  This study has changes noted when compared with the prior study:  mild inferior wall ischemia is now present     CT coronary angiogram with CAC 2/6/2020  The patient's image quality is poor due to morbid obesity.  The contrast did not fill coronary artery well.     1.  The total Agatston calcium score is 3784. A calcium score in this range places the individual in the 100th percentile when compared  to an age and gender matched control group and implies a very high risk of cardiac events in the next ten years.     2.  Mild disease in left main.     3.  Diffuse calcified three coronary arteries.  Due to poor image quality, cannot exclude severe stenosis in mid LAD, proximal LCX and mid RCA.     Recommend invasive coronary angiogram for further cardiac evaluation if clinically indicated.     Cardiac cath: from 2/19/2020 demonstrated   The left ventricular size is normal. The left ventricular systolic function is normal. LV systolic pressure is normal. LV end diastolic pressure is normal. There are no wall motion abnormalities in the left ventricle.  2nd Mrg lesion is 70% stenosed.  Ost LM lesion is 30% stenosed.  Ost LAD to Mid LAD lesion is 70% stenosed.  Mid LAD lesion is 50% stenosed.  Mid RCA lesion is 75% stenosed.  Prox RCA lesion is 90% stenosed.  Mild aortic valve gradient, possible aortic stenosis       Medications  Allergies   Current Outpatient Medications   Medication Sig Dispense Refill    albuterol (PROAIR HFA/PROVENTIL HFA/VENTOLIN HFA) 108 (90 Base) MCG/ACT inhaler INHALE 1 PUFF INTO THE LUNGS EVERY 4 HOURS AS NEEDED      aspirin 81 MG EC tablet [ASPIRIN 81 MG EC TABLET] Take 81 mg by mouth daily.      atorvastatin (LIPITOR) 80 MG tablet Take 1 tablet (80 mg) by mouth daily 90 tablet 3    bumetanide (BUMEX) 2 MG tablet TAKE 1 TABLET BY MOUTH DAILY 100 tablet 3    Cholecalciferol (VITAMIN D-3) 25 MCG (1000 UT) CAPS 1 capsule      Continuous Blood Gluc Sensor (FREESTYLE ANA M 3 SENSOR) MISC 1 Units every 14 days 6 each 3    Digital Therapy (HELLO HEART BLOOD PRESSURE) KIT       fluticasone-salmeterol (ADVAIR DISKUS) 250-50 MCG/ACT inhaler Inhale 1 puff into the lungs 2 times daily. 60 each 11    Insulin Infusion Pump Supplies (AUTOSOFT 90 INFUSION SET) MISC 1 each every 3 days 30 each 3    Insulin Infusion Pump Supplies (T:SLIM X2 3ML CARTRIDGE) MISC 1 each every 3 days 30 each 3    insulin lispro  (HUMALOG) 100 UNIT/ML vial INJECT SUBCUTANEOUSLY 400 UNITS  DAILY VIA INSULIN PUMP 120 mL 2    lisinopril (ZESTRIL) 20 MG tablet Take 1 tablet (20 mg) by mouth 2 times daily 180 tablet 3    metoprolol tartrate (LOPRESSOR) 100 MG tablet Take 1 tablet (100 mg) by mouth 2 times daily 180 tablet 3    multivitamin (ONE A DAY) per tablet [MULTIVITAMIN (ONE A DAY) PER TABLET] Take 1 tablet by mouth daily.      omeprazole (PRILOSEC) 20 MG capsule [OMEPRAZOLE (PRILOSEC) 20 MG CAPSULE] Take 20 mg by mouth daily.      triamcinolone (KENALOG) 0.025 % cream [TRIAMCINOLONE (KENALOG) 0.025 % CREAM] Apply 1 application topically 3 (three) times a day as needed.       TRULICITY 3 MG/0.5ML SOPN INJECT THE CONTENTS OF ONE PEN  SUBCUTANEOUSLY WEEKLY AS  DIRECTED 6 mL 1    amLODIPine (NORVASC) 10 MG tablet Take 10 mg by mouth daily        No Known Allergies     Physical Examination Review of Systems   Vitals: /80 (BP Location: Right arm, Patient Position: Sitting, Cuff Size: Adult Large)   Pulse 79   Resp 24   Wt (!) 166 kg (366 lb)   BMI 52.52 kg/m    BMI= Body mass index is 52.52 kg/m .  Wt Readings from Last 3 Encounters:   11/13/24 (!) 166 kg (366 lb)   09/17/24 (!) 163.4 kg (360 lb 3.2 oz)   09/05/24 (!) 164.8 kg (363 lb 4.8 oz)       General: pleasant male. Morbidly obese No acute distress.  Neck: JVD difficult to visualize.  Lungs: clear to auscultation  COR: regular rate and rhythm, No murmurs, rubs, or gallops  Abd: nondistended, BS present          Please refer above for cardiac ROS details.       Past History   Past Medical History:   Past Medical History:   Diagnosis Date    Asthma     Chronic obstructive pulmonary disease (H) 9/29/2019    Diabetes mellitus (H)     insulin pump    Diabetic neuropathy (H)     GERD (gastroesophageal reflux disease)     Hyperlipidemia     Hypertension     Morbid obesity (H)     Sleep apnea     uses CPAP        Past Surgical History:   Past Surgical History:   Procedure Laterality Date     COLONOSCOPY N/A 9/20/2017    Procedure: COLONOSCOPY;  Surgeon: Michael Fagan MD;  Location: [facility];  Service:     COLONOSCOPY N/A 11/16/2023    Procedure: COLONOSCOPY WITH POLYPECTOMY;  Surgeon: Lopez Huggins MD;  Location: [facility] Main OR    CV CORONARY ANGIOGRAM N/A 2/19/2020    Procedure: Coronary Angiogram;  Surgeon: Daniel Hayden MD;  Location: [facility] Cath Lab;  Service: Cardiology    CV LEFT HEART CATHETERIZATION WITH LEFT VENTRICULOGRAM N/A 2/19/2020    Procedure: Left Heart Catheterization with Left Ventriculogram;  Surgeon: Daniel Hayden MD;  Location: [facility] Cath Lab;  Service: Cardiology    GALLBLADDER SURGERY          Family History:   Family History   Problem Relation Age of Onset    Leukemia Mother     Heart Failure Mother     Snoring Mother     Leukemia Father     Snoring Father     Colon Cancer Sister         Social History:   Social History     Socioeconomic History    Marital status:      Spouse name: Not on file    Number of children: Not on file    Years of education: Not on file    Highest education level: Not on file   Occupational History    Not on file   Tobacco Use    Smoking status: Former     Passive exposure: Past    Smokeless tobacco: Never    Tobacco comments:     quit 40 years ago   Vaping Use    Vaping status: Never Used   Substance and Sexual Activity    Alcohol use: Yes     Comment: Alcoholic Drinks/day: one beer every two weeks     Drug use: No    Sexual activity: Not on file   Other Topics Concern    Not on file   Social History Narrative    Not on file     Social Drivers of Health     Financial Resource Strain: Not on file   Food Insecurity: Not on file   Transportation Needs: Not on file   Physical Activity: Not on file   Stress: Not on file   Social Connections: Not on file   Interpersonal Safety: Not on file   Housing Stability: Not on file            Lab Results    Chemistry/lipid CBC Cardiac Enzymes/BNP/TSH/INR   Lab Results    Component Value Date    CHOL 100 09/16/2024    HDL 35 (L) 09/16/2024    TRIG 154 (H) 09/16/2024    BUN 21.0 09/16/2024     09/16/2024    CO2 26 09/16/2024    Lab Results   Component Value Date    WBC 10.6 09/16/2024    HGB 15.0 09/16/2024    HCT 46.1 09/16/2024    MCV 91 09/16/2024     09/16/2024    Lab Results   Component Value Date    BNP 37 05/31/2022    INR 1.42 (H) 02/27/2020

## 2024-11-13 NOTE — PATIENT INSTRUCTIONS
It was a pleasure to meet with you today.      Below is a summary of your visit.   Continue your ketogenic diet, but also try to decrease overall calories and fat content to help with your weight loss.  Continue your medications without changes.  I encourage increasing physical activity in an effort to lose weight.   Follow up with me in a year or sooner if needed.    Please do not hesitate to call the [facility] with any questions or concerns at [phone].     Sincerely,

## 2024-11-13 NOTE — LETTER
11/13/2024    Piyush Chung MD  [address]  [place] [address]    RE: Walt Lambert       Dear Colleague,     I had the pleasure of seeing Walt Lambert in the [facility].    [facility] HEART CARE   [address]  [place] [address]   [email or web address]   OFFICE: [phone]     CARDIOLOGY CLINIC NOTE     Thank you, Piyush Reeves, for asking the [facility] Heart Care team to see Mr. Walt Lambert to evaluate Follow Up        Assessment/Recommendations   Assessment:    Coronary artery disease s/p CABG (LIMA to LAD, L radial artery to PDA, SVG to OM and SVG to Diagonal branch) by Dr. Russell on 2/26/2020 - stable without angina.  Hypertension - controlled.  Hyperlipidemia - on appropriate statin  Morbid obesity due to excess calories with comorbid conditions and BMI 52 - uncontrolled, but his weight has decreased some over the past year.  Insulin dependent DMII - much better controlled with A1C of 6.4  Chronic dyspnea on exertion - multifactorial due to deconditioning weight, HFpEF, and asthma. Also follows in pulmonary clinic.    Plan:    Continue medications without changes  Advised/reinforced lifestyle changes through diet/exercise to lose weight (he is currently following a ketogenic diet)  Follow up in a year or sooner if needed    The longitudinal plan of care for the diagnosis(es)/condition(s) as documented were addressed during this visit. Due to the added complexity in care, I will continue to support Alvino in the subsequent management and with ongoing continuity of care.         History of Present Illness   Mr. Walt Lambert is a 73 year old male who presents for follow-up of his coronary artery disease.     Mr. Lambert had a coronary artery bypass graft surgery in early February, 2020. This was performed after a stress test ordered for dyspnea on exertion was suggestive of ischemia vs artifact and a CTA  coronary angiogram revealed severely elevated CAC and multivessel CAD. He has chronic dyspnea on exertion that is multifactorial and due to morbid obesity, deconditioning, HFpEF, and asthma.     Currently, Alvino is feeling generally well. He started (mostly) following a ketogenic diet and has noted an improvement in diabetes control as well as some weight loss. He continues to have chronic MAHMOOD but is overall more active now than a year ago.      Other than noted above, Mr. Lambert denies any chest pain/pressure/tightness, shortness of breath at rest or with exertion, light headedness/dizziness, pre-syncope, syncope, lower extremity swelling, palpitations, paroxysmal nocturnal dyspnea (PND), or orthopnea.     Cardiac Problems and Cardiac Diagnostics     Most Recent Cardiac testing:    Cardiac MRI 6/27/22  CONCLUSIONS:  Normal biventricular function with LVEF and RVEF of 71%. Focal mesocardial LGE in basal  anterior wall in a nonischemic pattern. Otherwise, no MI, fibrosis or infiltrative disease. No evidence of  pericarditis. No definite evidence of edema or acute inflammation on T2 weighted images.     TTE 2/6/2020  1. Normal left ventricular size and systolic performance with a visually estimated ejection fraction of 65%.   2. There is mild to moderate concentric increase in left ventricular wall thickness.   3. No significant valvular heart disease is identified on this study.   4. Normal right ventricular size and systolic performance.      Stress test: dated 12/24/2019 revealed    The nuclear stress test is abnormal.     There is a small area of mild ischemia in the inferior segment(s) of the left ventricle. Cannot exclude artifact.     The left ventricular ejection fraction at stress is 61%.     A prior study was conducted on 3/10/2015.  This study has changes noted when compared with the prior study:  mild inferior wall ischemia is now present     CT coronary angiogram with CAC 2/6/2020  The patient's image  quality is poor due to morbid obesity.  The contrast did not fill coronary artery well.     1.  The total Agatston calcium score is 3784. A calcium score in this range places the individual in the 100th percentile when compared to an age and gender matched control group and implies a very high risk of cardiac events in the next ten years.     2.  Mild disease in left main.     3.  Diffuse calcified three coronary arteries.  Due to poor image quality, cannot exclude severe stenosis in mid LAD, proximal LCX and mid RCA.     Recommend invasive coronary angiogram for further cardiac evaluation if clinically indicated.     Cardiac cath: from 2/19/2020 demonstrated   The left ventricular size is normal. The left ventricular systolic function is normal. LV systolic pressure is normal. LV end diastolic pressure is normal. There are no wall motion abnormalities in the left ventricle.  2nd Mrg lesion is 70% stenosed.  Ost LM lesion is 30% stenosed.  Ost LAD to Mid LAD lesion is 70% stenosed.  Mid LAD lesion is 50% stenosed.  Mid RCA lesion is 75% stenosed.  Prox RCA lesion is 90% stenosed.  Mild aortic valve gradient, possible aortic stenosis       Medications  Allergies   Current Outpatient Medications   Medication Sig Dispense Refill     albuterol (PROAIR HFA/PROVENTIL HFA/VENTOLIN HFA) 108 (90 Base) MCG/ACT inhaler INHALE 1 PUFF INTO THE LUNGS EVERY 4 HOURS AS NEEDED       aspirin 81 MG EC tablet [ASPIRIN 81 MG EC TABLET] Take 81 mg by mouth daily.       atorvastatin (LIPITOR) 80 MG tablet Take 1 tablet (80 mg) by mouth daily 90 tablet 3     bumetanide (BUMEX) 2 MG tablet TAKE 1 TABLET BY MOUTH DAILY 100 tablet 3     Cholecalciferol (VITAMIN D-3) 25 MCG (1000 UT) CAPS 1 capsule       Continuous Blood Gluc Sensor (FREESTYLE ANA M 3 SENSOR) MISC 1 Units every 14 days 6 each 3     Digital Therapy (HELLO HEART BLOOD PRESSURE) KIT        fluticasone-salmeterol (ADVAIR DISKUS) 250-50 MCG/ACT inhaler Inhale 1 puff into the lungs  2 times daily. 60 each 11     Insulin Infusion Pump Supplies (AUTOSOFT 90 INFUSION SET) MISC 1 each every 3 days 30 each 3     Insulin Infusion Pump Supplies (T:SLIM X2 3ML CARTRIDGE) MISC 1 each every 3 days 30 each 3     insulin lispro (HUMALOG) 100 UNIT/ML vial INJECT SUBCUTANEOUSLY 400 UNITS  DAILY VIA INSULIN PUMP 120 mL 2     lisinopril (ZESTRIL) 20 MG tablet Take 1 tablet (20 mg) by mouth 2 times daily 180 tablet 3     metoprolol tartrate (LOPRESSOR) 100 MG tablet Take 1 tablet (100 mg) by mouth 2 times daily 180 tablet 3     multivitamin (ONE A DAY) per tablet [MULTIVITAMIN (ONE A DAY) PER TABLET] Take 1 tablet by mouth daily.       omeprazole (PRILOSEC) 20 MG capsule [OMEPRAZOLE (PRILOSEC) 20 MG CAPSULE] Take 20 mg by mouth daily.       triamcinolone (KENALOG) 0.025 % cream [TRIAMCINOLONE (KENALOG) 0.025 % CREAM] Apply 1 application topically 3 (three) times a day as needed.        TRULICITY 3 MG/0.5ML SOPN INJECT THE CONTENTS OF ONE PEN  SUBCUTANEOUSLY WEEKLY AS  DIRECTED 6 mL 1     amLODIPine (NORVASC) 10 MG tablet Take 10 mg by mouth daily        No Known Allergies     Physical Examination Review of Systems   Vitals: /80 (BP Location: Right arm, Patient Position: Sitting, Cuff Size: Adult Large)   Pulse 79   Resp 24   Wt (!) 166 kg (366 lb)   BMI 52.52 kg/m    BMI= Body mass index is 52.52 kg/m .  Wt Readings from Last 3 Encounters:   11/13/24 (!) 166 kg (366 lb)   09/17/24 (!) 163.4 kg (360 lb 3.2 oz)   09/05/24 (!) 164.8 kg (363 lb 4.8 oz)       General: pleasant male. Morbidly obese No acute distress.  Neck: JVD difficult to visualize.  Lungs: clear to auscultation  COR: regular rate and rhythm, No murmurs, rubs, or gallops  Abd: nondistended, BS present          Please refer above for cardiac ROS details.       Past History   Past Medical History:   Past Medical History:   Diagnosis Date     Asthma      Chronic obstructive pulmonary disease (H) 9/29/2019     Diabetes mellitus (H)      insulin pump     Diabetic neuropathy (H)      GERD (gastroesophageal reflux disease)      Hyperlipidemia      Hypertension      Morbid obesity (H)      Sleep apnea     uses CPAP        Past Surgical History:   Past Surgical History:   Procedure Laterality Date     COLONOSCOPY N/A 9/20/2017    Procedure: COLONOSCOPY;  Surgeon: Michael Fagan MD;  Location: [facility];  Service:      COLONOSCOPY N/A 11/16/2023    Procedure: COLONOSCOPY WITH POLYPECTOMY;  Surgeon: Lopez Huggins MD;  Location: [facility] Main OR     CV CORONARY ANGIOGRAM N/A 2/19/2020    Procedure: Coronary Angiogram;  Surgeon: Daniel Hayden MD;  Location: [facility] Cath Lab;  Service: Cardiology     CV LEFT HEART CATHETERIZATION WITH LEFT VENTRICULOGRAM N/A 2/19/2020    Procedure: Left Heart Catheterization with Left Ventriculogram;  Surgeon: Daniel Hayden MD;  Location: [facility] Cath Lab;  Service: Cardiology     GALLBLADDER SURGERY          Family History:   Family History   Problem Relation Age of Onset     Leukemia Mother      Heart Failure Mother      Snoring Mother      Leukemia Father      Snoring Father      Colon Cancer Sister         Social History:   Social History     Socioeconomic History     Marital status:      Spouse name: Not on file     Number of children: Not on file     Years of education: Not on file     Highest education level: Not on file   Occupational History     Not on file   Tobacco Use     Smoking status: Former     Passive exposure: Past     Smokeless tobacco: Never     Tobacco comments:     quit 40 years ago   Vaping Use     Vaping status: Never Used   Substance and Sexual Activity     Alcohol use: Yes     Comment: Alcoholic Drinks/day: one beer every two weeks      Drug use: No     Sexual activity: Not on file   Other Topics Concern     Not on file   Social History Narrative     Not on file     Social Drivers of Health     Financial Resource Strain: Not on file   Food Insecurity: Not on file    Transportation Needs: Not on file   Physical Activity: Not on file   Stress: Not on file   Social Connections: Not on file   Interpersonal Safety: Not on file   Housing Stability: Not on file            Lab Results    Chemistry/lipid CBC Cardiac Enzymes/BNP/TSH/INR   Lab Results   Component Value Date    CHOL 100 09/16/2024    HDL 35 (L) 09/16/2024    TRIG 154 (H) 09/16/2024    BUN 21.0 09/16/2024     09/16/2024    CO2 26 09/16/2024    Lab Results   Component Value Date    WBC 10.6 09/16/2024    HGB 15.0 09/16/2024    HCT 46.1 09/16/2024    MCV 91 09/16/2024     09/16/2024    Lab Results   Component Value Date    BNP 37 05/31/2022    INR 1.42 (H) 02/27/2020                Thank you for allowing me to participate in the care of your patient.      Sincerely,     Mitch Duncan MD     [facility]  cc:   Mitch Duncan MD  [address]  [place] [address]

## 2024-11-22 ENCOUNTER — LAB REQUISITION (OUTPATIENT)
Dept: LAB | Facility: CLINIC | Age: 74
End: 2024-11-22
Payer: COMMERCIAL

## 2024-11-22 DIAGNOSIS — I10 ESSENTIAL (PRIMARY) HYPERTENSION: ICD-10-CM

## 2024-11-22 LAB
ALBUMIN SERPL BCG-MCNC: 4.1 G/DL (ref 3.5–5.2)
ALP SERPL-CCNC: 104 U/L (ref 40–150)
ALT SERPL W P-5'-P-CCNC: 59 U/L (ref 0–70)
ANION GAP SERPL CALCULATED.3IONS-SCNC: 13 MMOL/L (ref 7–15)
AST SERPL W P-5'-P-CCNC: 44 U/L (ref 0–45)
BILIRUB SERPL-MCNC: 1.4 MG/DL
BUN SERPL-MCNC: 27.9 MG/DL (ref 8–23)
CALCIUM SERPL-MCNC: 9.2 MG/DL (ref 8.8–10.4)
CHLORIDE SERPL-SCNC: 103 MMOL/L (ref 98–107)
CREAT SERPL-MCNC: 1.3 MG/DL (ref 0.67–1.17)
EGFRCR SERPLBLD CKD-EPI 2021: 59 ML/MIN/1.73M2
GLUCOSE SERPL-MCNC: 113 MG/DL (ref 70–99)
HCO3 SERPL-SCNC: 26 MMOL/L (ref 22–29)
POTASSIUM SERPL-SCNC: 4.2 MMOL/L (ref 3.4–5.3)
PROT SERPL-MCNC: 6.8 G/DL (ref 6.4–8.3)
SODIUM SERPL-SCNC: 142 MMOL/L (ref 135–145)

## 2024-11-22 PROCEDURE — 80053 COMPREHEN METABOLIC PANEL: CPT | Mod: ORL | Performed by: STUDENT IN AN ORGANIZED HEALTH CARE EDUCATION/TRAINING PROGRAM

## 2024-11-25 DIAGNOSIS — Z79.4 TYPE 2 DIABETES MELLITUS WITH OTHER CIRCULATORY COMPLICATION, WITH LONG-TERM CURRENT USE OF INSULIN (H): ICD-10-CM

## 2024-11-25 DIAGNOSIS — E11.59 TYPE 2 DIABETES MELLITUS WITH OTHER CIRCULATORY COMPLICATION, WITH LONG-TERM CURRENT USE OF INSULIN (H): ICD-10-CM

## 2024-11-25 RX ORDER — ACYCLOVIR 800 MG/1
TABLET ORAL
Qty: 6 EACH | Refills: 0 | Status: SHIPPED | OUTPATIENT
Start: 2024-11-25

## 2024-11-25 NOTE — TELEPHONE ENCOUNTER
Requested Prescriptions   Pending Prescriptions Disp Refills    Continuous Glucose Sensor (FREESTYLE ANA M 3 SENSOR) MISC [Pharmacy Med Name: FREESTYLE LIBRE_3 SENSOR]  2     Sig: USE 1 SENSOR EVERY 14 DAYS       There is no refill protocol information for this order

## 2024-12-02 ENCOUNTER — ANESTHESIA EVENT (OUTPATIENT)
Dept: SURGERY | Facility: CLINIC | Age: 74
End: 2024-12-02
Payer: COMMERCIAL

## 2024-12-02 ENCOUNTER — ANESTHESIA (OUTPATIENT)
Dept: SURGERY | Facility: CLINIC | Age: 74
End: 2024-12-02
Payer: COMMERCIAL

## 2024-12-02 ENCOUNTER — HOSPITAL ENCOUNTER (OUTPATIENT)
Facility: CLINIC | Age: 74
Discharge: HOME | End: 2024-12-02
Attending: INTERNAL MEDICINE | Admitting: INTERNAL MEDICINE
Payer: COMMERCIAL

## 2024-12-02 VITALS
TEMPERATURE: 97 F | DIASTOLIC BLOOD PRESSURE: 55 MMHG | OXYGEN SATURATION: 93 % | WEIGHT: 315 LBS | HEART RATE: 65 BPM | BODY MASS INDEX: 45.1 KG/M2 | SYSTOLIC BLOOD PRESSURE: 101 MMHG | RESPIRATION RATE: 22 BRPM | HEIGHT: 70 IN

## 2024-12-02 LAB
COLONOSCOPY: NORMAL
GLUCOSE BLDC GLUCOMTR-MCNC: 147 MG/DL (ref 70–99)

## 2024-12-02 PROCEDURE — 258N000003 HC RX IP 258 OP 636: Performed by: STUDENT IN AN ORGANIZED HEALTH CARE EDUCATION/TRAINING PROGRAM

## 2024-12-02 PROCEDURE — 82962 GLUCOSE BLOOD TEST: CPT

## 2024-12-02 PROCEDURE — 250N000011 HC RX IP 250 OP 636: Performed by: STUDENT IN AN ORGANIZED HEALTH CARE EDUCATION/TRAINING PROGRAM

## 2024-12-02 PROCEDURE — 272N000001 HC OR GENERAL SUPPLY STERILE: Performed by: INTERNAL MEDICINE

## 2024-12-02 PROCEDURE — 360N000075 HC SURGERY LEVEL 2, PER MIN: Performed by: INTERNAL MEDICINE

## 2024-12-02 PROCEDURE — 710N000012 HC RECOVERY PHASE 2, PER MINUTE: Performed by: INTERNAL MEDICINE

## 2024-12-02 PROCEDURE — 999N000141 HC STATISTIC PRE-PROCEDURE NURSING ASSESSMENT: Performed by: INTERNAL MEDICINE

## 2024-12-02 PROCEDURE — 370N000017 HC ANESTHESIA TECHNICAL FEE, PER MIN: Performed by: INTERNAL MEDICINE

## 2024-12-02 PROCEDURE — 88305 TISSUE EXAM BY PATHOLOGIST: CPT | Mod: TC | Performed by: INTERNAL MEDICINE

## 2024-12-02 RX ORDER — LIDOCAINE 40 MG/G
CREAM TOPICAL
Status: DISCONTINUED | OUTPATIENT
Start: 2024-12-02 | End: 2024-12-02 | Stop reason: HOSPADM

## 2024-12-02 RX ORDER — DEXAMETHASONE SODIUM PHOSPHATE 10 MG/ML
4 INJECTION, SOLUTION INTRAMUSCULAR; INTRAVENOUS
Status: DISCONTINUED | OUTPATIENT
Start: 2024-12-02 | End: 2024-12-02 | Stop reason: HOSPADM

## 2024-12-02 RX ORDER — SODIUM CHLORIDE, SODIUM LACTATE, POTASSIUM CHLORIDE, CALCIUM CHLORIDE 600; 310; 30; 20 MG/100ML; MG/100ML; MG/100ML; MG/100ML
INJECTION, SOLUTION INTRAVENOUS CONTINUOUS
Status: DISCONTINUED | OUTPATIENT
Start: 2024-12-02 | End: 2024-12-02 | Stop reason: HOSPADM

## 2024-12-02 RX ORDER — NALOXONE HYDROCHLORIDE 0.4 MG/ML
0.1 INJECTION, SOLUTION INTRAMUSCULAR; INTRAVENOUS; SUBCUTANEOUS
Status: DISCONTINUED | OUTPATIENT
Start: 2024-12-02 | End: 2024-12-02 | Stop reason: HOSPADM

## 2024-12-02 RX ORDER — ONDANSETRON 4 MG/1
4 TABLET, ORALLY DISINTEGRATING ORAL EVERY 30 MIN PRN
Status: DISCONTINUED | OUTPATIENT
Start: 2024-12-02 | End: 2024-12-02 | Stop reason: HOSPADM

## 2024-12-02 RX ORDER — PROPOFOL 10 MG/ML
INJECTION, EMULSION INTRAVENOUS PRN
Status: DISCONTINUED | OUTPATIENT
Start: 2024-12-02 | End: 2024-12-02

## 2024-12-02 RX ORDER — ONDANSETRON 4 MG/1
4 TABLET, ORALLY DISINTEGRATING ORAL EVERY 6 HOURS PRN
Status: DISCONTINUED | OUTPATIENT
Start: 2024-12-02 | End: 2024-12-02 | Stop reason: HOSPADM

## 2024-12-02 RX ORDER — ONDANSETRON 2 MG/ML
4 INJECTION INTRAMUSCULAR; INTRAVENOUS EVERY 6 HOURS PRN
Status: DISCONTINUED | OUTPATIENT
Start: 2024-12-02 | End: 2024-12-02 | Stop reason: HOSPADM

## 2024-12-02 RX ORDER — PROPOFOL 10 MG/ML
INJECTION, EMULSION INTRAVENOUS CONTINUOUS PRN
Status: DISCONTINUED | OUTPATIENT
Start: 2024-12-02 | End: 2024-12-02

## 2024-12-02 RX ORDER — OXYCODONE HYDROCHLORIDE 5 MG/1
5 TABLET ORAL
Status: DISCONTINUED | OUTPATIENT
Start: 2024-12-02 | End: 2024-12-02 | Stop reason: HOSPADM

## 2024-12-02 RX ORDER — ONDANSETRON 2 MG/ML
4 INJECTION INTRAMUSCULAR; INTRAVENOUS EVERY 30 MIN PRN
Status: DISCONTINUED | OUTPATIENT
Start: 2024-12-02 | End: 2024-12-02 | Stop reason: HOSPADM

## 2024-12-02 RX ORDER — FLUMAZENIL 0.1 MG/ML
0.2 INJECTION, SOLUTION INTRAVENOUS
Status: DISCONTINUED | OUTPATIENT
Start: 2024-12-02 | End: 2024-12-02 | Stop reason: HOSPADM

## 2024-12-02 RX ORDER — PROCHLORPERAZINE MALEATE 5 MG/1
5 TABLET ORAL EVERY 6 HOURS PRN
Status: DISCONTINUED | OUTPATIENT
Start: 2024-12-02 | End: 2024-12-02 | Stop reason: HOSPADM

## 2024-12-02 RX ORDER — OXYCODONE HYDROCHLORIDE 5 MG/1
10 TABLET ORAL
Status: DISCONTINUED | OUTPATIENT
Start: 2024-12-02 | End: 2024-12-02 | Stop reason: HOSPADM

## 2024-12-02 RX ADMIN — PROPOFOL 150 MCG/KG/MIN: 10 INJECTION, EMULSION INTRAVENOUS at 08:11

## 2024-12-02 RX ADMIN — PHENYLEPHRINE HYDROCHLORIDE 100 MCG: 10 INJECTION INTRAVENOUS at 08:16

## 2024-12-02 RX ADMIN — PROPOFOL 50 MG: 10 INJECTION, EMULSION INTRAVENOUS at 08:11

## 2024-12-02 RX ADMIN — PHENYLEPHRINE HYDROCHLORIDE 150 MCG: 10 INJECTION INTRAVENOUS at 08:31

## 2024-12-02 ASSESSMENT — ACTIVITIES OF DAILY LIVING (ADL)
ADLS_ACUITY_SCORE: 41
ADLS_ACUITY_SCORE: 41

## 2024-12-02 ASSESSMENT — COPD QUESTIONNAIRES: COPD: 1

## 2024-12-02 NOTE — H&P
Pre-procedure Note    Reason for procedure: History of polyps    History and Physical Reviewed: Reviewed, no changes.    Pre-sedation assessment:    General: alert, appears stated age, and cooperative  Airway: normal  Heart: regular rate and rhythm  Lungs: clear to auscultation bilaterally    Sedation Plan based on assessment: MAC    Mallampati score: Class IV (visualization of the hard palate only, soft palate not visable)          ASA Classification: ASA 4 - Patient with severe systemic disease that is a constant threat to life    Impression: Patient deemed adequate candidate for MAC sedation    Risks, benefits and alternatives were discussed with the patient and informed consent was obtained.    Plan: colonoscopy    Thank you for the opportunity to participate in the care of this patient. Please feel free to call with any questions or concerns.     Karlos Mike MD   Cell [phone]  After 5 PM, please call [phone]

## 2024-12-02 NOTE — ANESTHESIA POSTPROCEDURE EVALUATION
Patient: Walt Lambert    Procedure: Procedure(s):  COLONOSCOPY WITH POLYPECTOMIES       Anesthesia Type:  MAC    Note:  Disposition: Outpatient   Postop Pain Control: Uneventful            Sign Out: Well controlled pain   PONV: No   Neuro/Psych: Uneventful            Sign Out: Acceptable/Baseline neuro status   Airway/Respiratory: Uneventful            Sign Out: Acceptable/Baseline resp. status   CV/Hemodynamics: Uneventful            Sign Out: Acceptable CV status; No obvious hypovolemia; No obvious fluid overload   Other NRE: NONE   DID A NON-ROUTINE EVENT OCCUR? No       Last vitals:  Vitals Value Taken Time   /55 12/02/24 0857   Temp 36.1  C (97  F) 12/02/24 0850   Pulse 63 12/02/24 0900   Resp 22 12/02/24 0857   SpO2 92 % 12/02/24 0900   Vitals shown include unfiled device data.    Electronically Signed By: Leslie Goldberg, MD  December 2, 2024  10:42 AM

## 2024-12-02 NOTE — ANESTHESIA CARE TRANSFER NOTE
Patient: Walt Lambert    Procedure: Procedure(s):  COLONOSCOPY WITH POLYPECTOMIES       Diagnosis: Screening for colon cancer [Z12.11]  Diagnosis Additional Information: No value filed.    Anesthesia Type:   MAC     Note:    Oropharynx: oropharynx clear of all foreign objects  Level of Consciousness: drowsy  Oxygen Supplementation: face mask  Level of Supplemental Oxygen (L/min / FiO2): 6  Independent Airway: airway patency satisfactory and stable    Vital Signs Stable: post-procedure vital signs reviewed and stable  Report to RN Given: handoff report given  Patient transferred to: Phase II    Handoff Report: Identifed the Patient, Identified the Reponsible Provider, Reviewed the pertinent medical history, Discussed the surgical course, Reviewed Intra-OP anesthesia mangement and issues during anesthesia, Set expectations for post-procedure period and Allowed opportunity for questions and acknowledgement of understanding      Vitals:  Vitals Value Taken Time   BP 98/53 12/02/24 0840   Temp 36.2  C (97.1  F) 12/02/24 0839   Pulse 71 12/02/24 0839   Resp 22 12/02/24 0839   SpO2 97 % 12/02/24 0839       Electronically Signed By: JORGE Herrera CRNA  December 2, 2024  8:42 AM

## 2024-12-02 NOTE — ANESTHESIA PREPROCEDURE EVALUATION
Anesthesia Pre-Procedure Evaluation    Patient: Walt Lambert   MRN: [id number] : 1950        Procedure : Procedure(s):  COLONOSCOPY          Past Medical History:   Diagnosis Date    Asthma     Chronic obstructive pulmonary disease (H) 2019    Diabetes mellitus (H)     insulin pump    Diabetic neuropathy (H)     GERD (gastroesophageal reflux disease)     Heart disease     Hyperlipidemia     Hypertension     Morbid obesity (H)     Sleep apnea     uses CPAP      Past Surgical History:   Procedure Laterality Date    COLONOSCOPY N/A 2017    Procedure: COLONOSCOPY;  Surgeon: Michael Fagan MD;  Location: [facility] GI;  Service:     COLONOSCOPY N/A 2023    Procedure: COLONOSCOPY WITH POLYPECTOMY;  Surgeon: Lopez Huggins MD;  Location: [facility] OR    CV CORONARY ANGIOGRAM N/A 2020    Procedure: Coronary Angiogram;  Surgeon: Daniel Hayden MD;  Location: [facility] Cath Lab;  Service: Cardiology    CV LEFT HEART CATHETERIZATION WITH LEFT VENTRICULOGRAM N/A 2020    Procedure: Left Heart Catheterization with Left Ventriculogram;  Surgeon: Daniel Hayden MD;  Location: [facility] Cath Lab;  Service: Cardiology    GALLBLADDER SURGERY        No Known Allergies   Social History     Tobacco Use    Smoking status: Former     Passive exposure: Past    Smokeless tobacco: Never    Tobacco comments:     quit 40 years ago   Substance Use Topics    Alcohol use: Yes     Comment: Alcoholic Drinks/day: one beer every two weeks       Wt Readings from Last 1 Encounters:   24 (!) 166 kg (366 lb)        Anesthesia Evaluation   Pt has had prior anesthetic. Type: General.    No history of anesthetic complications       ROS/MED HX  ENT/Pulmonary:     (+) sleep apnea, uses CPAP,                        COPD,              Neurologic:  - neg neurologic ROS     Cardiovascular:  - neg cardiovascular ROS   (+)  hypertension- -  CAD -  CABG- -                                     "  METS/Exercise Tolerance: >4 METS    Hematologic:  - neg hematologic  ROS     Musculoskeletal:  - neg musculoskeletal ROS     GI/Hepatic:     (+) GERD,       bowel prep,            Renal/Genitourinary:  - neg Renal ROS   (+) renal disease, type: CRI,            Endo:     (+)  type II DM, Last HgA1c: 7.3,   - using insulin pump.   Diabetic complications: nephropathy cardiac problems.      Obesity,       Psychiatric/Substance Use:  - neg psychiatric ROS     Infectious Disease:  - neg infectious disease ROS     Malignancy:  - neg malignancy ROS     Other:  - neg other ROS          Physical Exam    Airway        Mallampati: IV   TM distance: > 3 FB   Neck ROM: full   Mouth opening: > 3 cm    Respiratory Devices and Support         Dental       (+) Modest Abnormalities - crowns, retainers, 1 or 2 missing teeth      Cardiovascular   cardiovascular exam normal       Rhythm and rate: regular and normal     Pulmonary   pulmonary exam normal        breath sounds clear to auscultation           OUTSIDE LABS:  CBC:   Lab Results   Component Value Date    WBC 10.6 09/16/2024    WBC 9.0 10/10/2023    HGB 15.0 09/16/2024    HGB 15.1 10/10/2023    HCT 46.1 09/16/2024    HCT 45.0 10/10/2023     09/16/2024     10/10/2023     BMP:   Lab Results   Component Value Date     11/22/2024     09/16/2024    POTASSIUM 4.2 11/22/2024    POTASSIUM 3.9 09/16/2024    CHLORIDE 103 11/22/2024    CHLORIDE 103 09/16/2024    CO2 26 11/22/2024    CO2 26 09/16/2024    BUN 27.9 (H) 11/22/2024    BUN 21.0 09/16/2024    CR 1.30 (H) 11/22/2024    CR 1.32 (H) 09/16/2024     (H) 11/22/2024    GLC 82 09/16/2024     COAGS:   Lab Results   Component Value Date    PTT 33 02/26/2020    INR 1.42 (H) 02/27/2020    FIBR 271 02/26/2020     POC: No results found for: \"BGM\", \"HCG\", \"HCGS\"  HEPATIC:   Lab Results   Component Value Date    ALBUMIN 4.1 11/22/2024    PROTTOTAL 6.8 11/22/2024    ALT 59 11/22/2024    AST 44 11/22/2024    " ALKPHOS 104 11/22/2024    BILITOTAL 1.4 (H) 11/22/2024     OTHER:   Lab Results   Component Value Date    PH 7.45 02/29/2020    A1C 6.4 (H) 08/26/2024    VALERIA 9.2 11/22/2024    MAG 2.0 03/02/2020       Anesthesia Plan    ASA Status:  4    NPO Status:  NPO Appropriate    Anesthesia Type: MAC.     - Reason for MAC: immobility needed              Consents    Anesthesia Plan(s) and associated risks, benefits, and realistic alternatives discussed. Questions answered and patient/representative(s) expressed understanding.     - Discussed:     - Discussed with:  Patient      - Extended Intubation/Ventilatory Support Discussed: No.      - Patient is DNR/DNI Status: No     Use of blood products discussed: No .     Postoperative Care       PONV prophylaxis: Ondansetron (or other 5HT-3), Dexamethasone or Solumedrol, Background Propofol Infusion     Comments:                Antonio Mckenzie MD

## 2024-12-03 ENCOUNTER — LAB (OUTPATIENT)
Dept: LAB | Facility: CLINIC | Age: 74
End: 2024-12-03
Payer: COMMERCIAL

## 2024-12-03 DIAGNOSIS — E11.59 TYPE 2 DIABETES MELLITUS WITH OTHER CIRCULATORY COMPLICATIONS (H): ICD-10-CM

## 2024-12-03 LAB
ANION GAP SERPL CALCULATED.3IONS-SCNC: 12 MMOL/L (ref 7–15)
BUN SERPL-MCNC: 16.8 MG/DL (ref 8–23)
CALCIUM SERPL-MCNC: 9 MG/DL (ref 8.8–10.4)
CHLORIDE SERPL-SCNC: 103 MMOL/L (ref 98–107)
CREAT SERPL-MCNC: 1.17 MG/DL (ref 0.67–1.17)
CREAT UR-MCNC: 52.9 MG/DL
EGFRCR SERPLBLD CKD-EPI 2021: 67 ML/MIN/1.73M2
EST. AVERAGE GLUCOSE BLD GHB EST-MCNC: 143 MG/DL
GLUCOSE SERPL-MCNC: 119 MG/DL (ref 70–99)
HBA1C MFR BLD: 6.6 % (ref 0–5.6)
HCO3 SERPL-SCNC: 28 MMOL/L (ref 22–29)
LDLC SERPL DIRECT ASSAY-MCNC: 44 MG/DL
MICROALBUMIN UR-MCNC: 69.5 MG/L
MICROALBUMIN/CREAT UR: 131.38 MG/G CR (ref 0–17)
PATH REPORT.COMMENTS IMP SPEC: NORMAL
PATH REPORT.COMMENTS IMP SPEC: NORMAL
PATH REPORT.FINAL DX SPEC: NORMAL
PATH REPORT.GROSS SPEC: NORMAL
PATH REPORT.MICROSCOPIC SPEC OTHER STN: NORMAL
PATH REPORT.RELEVANT HX SPEC: NORMAL
PHOTO IMAGE: NORMAL
POTASSIUM SERPL-SCNC: 4.3 MMOL/L (ref 3.4–5.3)
SODIUM SERPL-SCNC: 143 MMOL/L (ref 135–145)

## 2024-12-03 PROCEDURE — 36415 COLL VENOUS BLD VENIPUNCTURE: CPT

## 2024-12-03 PROCEDURE — 82570 ASSAY OF URINE CREATININE: CPT

## 2024-12-03 PROCEDURE — 82043 UR ALBUMIN QUANTITATIVE: CPT

## 2024-12-03 PROCEDURE — 88305 TISSUE EXAM BY PATHOLOGIST: CPT | Mod: 26 | Performed by: PATHOLOGY

## 2024-12-03 PROCEDURE — 83721 ASSAY OF BLOOD LIPOPROTEIN: CPT

## 2024-12-03 PROCEDURE — 83036 HEMOGLOBIN GLYCOSYLATED A1C: CPT

## 2024-12-03 PROCEDURE — 80048 BASIC METABOLIC PNL TOTAL CA: CPT

## 2024-12-19 ENCOUNTER — OFFICE VISIT (OUTPATIENT)
Dept: ENDOCRINOLOGY | Facility: CLINIC | Age: 74
End: 2024-12-19
Payer: COMMERCIAL

## 2024-12-19 VITALS
WEIGHT: 315 LBS | SYSTOLIC BLOOD PRESSURE: 133 MMHG | BODY MASS INDEX: 53.52 KG/M2 | OXYGEN SATURATION: 92 % | HEART RATE: 83 BPM | DIASTOLIC BLOOD PRESSURE: 83 MMHG

## 2024-12-19 DIAGNOSIS — R06.09 DOE (DYSPNEA ON EXERTION): ICD-10-CM

## 2024-12-19 DIAGNOSIS — E11.59 TYPE 2 DIABETES MELLITUS WITH OTHER CIRCULATORY COMPLICATIONS (H): Primary | ICD-10-CM

## 2024-12-19 NOTE — PROGRESS NOTES
[facility] ENDOCRINOLOGY    Diabetes Note 12/19/2024    Walt Lambert, 1950, [id number]          Reason for visit      1. Type 2 diabetes mellitus with other circulatory complications (H)    2. MAHMOOD (dyspnea on exertion)        HPI     Walt Lambert is a very pleasant 74 year old old male who presents for follow up.  SUMMARY:    Alvino is seen today in follow-up for DM 2. This is in compliance for Medicare coverage for insulin pumps. He is using the Tandem insulin pump system with Dexcom CGM, which was downloaded and data was reviewed.     TIR was 68%, above, 32%. He had no hypoglycemia recorded. He is not using Control IQ. He is appropriately inputting his data and choosing the insulin dosing on his own. He is getting about 265 units of insulin a day. This means that he is putting insulin into his pump on a daily basis.     In addition to the insulin , he is taking Trulicity, 3 mg, weekly.     Alvino was very dyspneic this morning after walking into the building. He had auditory wheezes as well. As he rested, this resolved.     Renal function remains within normal range. Microalbuminuria has worsened somewhat in the last 3 months. LDL is below 70.     He reports that he now has Psoriasis and is being treated by Derm    Blood glucose data:    Insulin Pump Settings     Basal Rate  TIME Units/HR   0000 - 0000 5.0                                 Bolus: Insulin : CHO ratio  Time Units Grams CHO   0000 - 0000 1 8                                      Correction  #Units Blood glucose >Target BG   1 2 110             Past Medical History     Patient Active Problem List   Diagnosis    Diabetes mellitus (H)    Morbid obesity (H)    Cardiomyopathy (H)    Chronic obstructive pulmonary disease (H)    Hyperlipidemia    Hypertension    Sleep apnea    Insulin pump status    MAHMOOD (dyspnea on exertion)    Abnormal cardiac CT angiography    CAD (coronary artery disease)    S/P CABG (coronary artery bypass graft)    ARF  (acute respiratory failure) (H)    Anemia due to blood loss, acute    Arteriosclerosis of coronary artery bypass graft    Body mass index (BMI) 50.0-59.9, adult    Pure hypertriglyceridemia    Peripheral venous insufficiency    Lymphedema    Chronic kidney disease, stage 1    Hammertoe, bilateral    Onychomycosis    Anhidrosis    Type 2 diabetes mellitus with other circulatory complications (H)    History of colon polyps        Family History       family history includes Colon Cancer in his sister; Heart Failure in his mother; Leukemia in his father and mother; Snoring in his father and mother.    Social History      reports that he has quit smoking. He has been exposed to tobacco smoke. He has never used smokeless tobacco. He reports current alcohol use. He reports that he does not use drugs.      Review of Systems     Patient has no polyuria or polydipsia, no chest pain, dyspnea or TIA's, no numbness, tingling or pain in extremities  Remainder negative except as noted in HPI.    Vital Signs     /83 (BP Location: Left arm, Patient Position: Sitting, Cuff Size: Adult Large)   Pulse 83   Wt (!) 169.2 kg (373 lb)   SpO2 92%   BMI 53.52 kg/m    Wt Readings from Last 3 Encounters:   12/19/24 (!) 169.2 kg (373 lb)   12/02/24 (!) 163.3 kg (360 lb)   11/13/24 (!) 166 kg (366 lb)       Physical Exam     Constitutional:  Well developed, Well nourished, obese  HENT:  Normocephalic,   Neck: Thyroid normal, No lymph nodes, Supple  Eyes:  PERRL, Conjunctiva pink  Respiratory:  mild respiratory distress, auditory wheezing while ambulating  Cardiovascular:  Normal heart rate, Normal rhythm, No murmurs  Musculoskeletal:  No gross deformity or lesions,   Skin: No acanthosis nigricans, lipoatrophy or lipodystrophy  Neurologic:  Alert & oriented x 3, nonfocal  Psychiatric:  Affect, Mood, Insight appropriate  Diabetic foot exam: no ulcers, charcot's or high risk calluses,    Assessment     1. Type 2 diabetes mellitus with  other circulatory complications (H)    2. MAHMOOD (dyspnea on exertion)        Plan     Discussed changing from manual to using control IQ. He is going to think about it. Follow-up in 3 months.         Emily Alfred NP  [facility]  12/19/2024  8:25 AM      Lab Results     Microalbumin Urine mg/dL   Date Value Ref Range Status   10/02/2020 17.87 (H) 0.00 - 1.99 mg/dL Final       Cholesterol   Date Value Ref Range Status   09/16/2024 100 <200 mg/dL Final     Direct Measure HDL   Date Value Ref Range Status   09/16/2024 35 (L) >=40 mg/dL Final     Triglycerides   Date Value Ref Range Status   09/16/2024 154 (H) <150 mg/dL Final       [unfilled]      Current Medications     Outpatient Medications Prior to Visit   Medication Sig Dispense Refill    albuterol (PROAIR HFA/PROVENTIL HFA/VENTOLIN HFA) 108 (90 Base) MCG/ACT inhaler INHALE 1 PUFF INTO THE LUNGS EVERY 4 HOURS AS NEEDED      aspirin 81 MG EC tablet [ASPIRIN 81 MG EC TABLET] Take 81 mg by mouth daily.      atorvastatin (LIPITOR) 80 MG tablet Take 1 tablet (80 mg) by mouth daily 90 tablet 3    bumetanide (BUMEX) 2 MG tablet TAKE 1 TABLET BY MOUTH DAILY 100 tablet 3    Cholecalciferol (VITAMIN D-3) 25 MCG (1000 UT) CAPS 1 capsule      Continuous Glucose Sensor (FREESTYLE ANA M 3 SENSOR) MISC USE 1 SENSOR EVERY 14 DAYS 6 each 0    Digital Therapy (HELLO HEART BLOOD PRESSURE) KIT       fluticasone-salmeterol (ADVAIR DISKUS) 250-50 MCG/ACT inhaler Inhale 1 puff into the lungs 2 times daily. 60 each 11    Insulin Infusion Pump Supplies (AUTOSOFT 90 INFUSION SET) MISC 1 each every 3 days 30 each 3    Insulin Infusion Pump Supplies (T:SLIM X2 3ML CARTRIDGE) MISC 1 each every 3 days 30 each 3    insulin lispro (HUMALOG) 100 UNIT/ML vial INJECT SUBCUTANEOUSLY 400 UNITS  DAILY VIA INSULIN PUMP 120 mL 2    lisinopril (ZESTRIL) 20 MG tablet Take 1 tablet (20 mg) by mouth 2 times daily 180 tablet 3    metoprolol tartrate (LOPRESSOR) 100 MG tablet Take 1 tablet (100 mg) by  mouth 2 times daily 180 tablet 3    multivitamin (ONE A DAY) per tablet [MULTIVITAMIN (ONE A DAY) PER TABLET] Take 1 tablet by mouth daily.      omeprazole (PRILOSEC) 20 MG capsule [OMEPRAZOLE (PRILOSEC) 20 MG CAPSULE] Take 20 mg by mouth daily.      TRULICITY 3 MG/0.5ML SOPN INJECT THE CONTENTS OF ONE PEN  SUBCUTANEOUSLY WEEKLY AS  DIRECTED 6 mL 1    triamcinolone (KENALOG) 0.025 % cream [TRIAMCINOLONE (KENALOG) 0.025 % CREAM] Apply 1 application topically 3 (three) times a day as needed.        No facility-administered medications prior to visit.

## 2024-12-19 NOTE — LETTER
12/19/2024      Walt Lambert  [address] [place] [id number]      Dear Colleague,    Thank you for referring your patient, Walt Lambert, to the [facility] [place]. Please see a copy of my visit note below.    [facility] ENDOCRINOLOGY    Diabetes Note 12/19/2024    Walt Lambert, 1950, [id number]          Reason for visit      1. Type 2 diabetes mellitus with other circulatory complications (H)    2. MAHMOOD (dyspnea on exertion)        HPI     Walt Lambert is a very pleasant 74 year old old male who presents for follow up.  SUMMARY:    Alvino is seen today in follow-up for DM 2. This is in compliance for Medicare coverage for insulin pumps. He is using the Tandem insulin pump system with Dexcom CGM, which was downloaded and data was reviewed.     TIR was 68%, above, 32%. He had no hypoglycemia recorded. He is not using Control IQ. He is appropriately inputting his data and choosing the insulin dosing on his own. He is getting about 265 units of insulin a day. This means that he is putting insulin into his pump on a daily basis.     In addition to the insulin , he is taking Trulicity, 3 mg, weekly.     Alvino was very dyspneic this morning after walking into the building. He had auditory wheezes as well. As he rested, this resolved.     Renal function remains within normal range. Microalbuminuria has worsened somewhat in the last 3 months. LDL is below 70.     He reports that he now has Psoriasis and is being treated by Derm    Blood glucose data:    Insulin Pump Settings     Basal Rate  TIME Units/HR   0000 - 0000 5.0                                 Bolus: Insulin : CHO ratio  Time Units Grams CHO   0000 - 0000 1 8                                      Correction  #Units Blood glucose >Target BG   1 2 110             Past Medical History     Patient Active Problem List   Diagnosis     Diabetes mellitus (H)     Morbid obesity (H)     Cardiomyopathy (H)      Chronic obstructive pulmonary disease (H)     Hyperlipidemia     Hypertension     Sleep apnea     Insulin pump status     MAHMOOD (dyspnea on exertion)     Abnormal cardiac CT angiography     CAD (coronary artery disease)     S/P CABG (coronary artery bypass graft)     ARF (acute respiratory failure) (H)     Anemia due to blood loss, acute     Arteriosclerosis of coronary artery bypass graft     Body mass index (BMI) 50.0-59.9, adult     Pure hypertriglyceridemia     Peripheral venous insufficiency     Lymphedema     Chronic kidney disease, stage 1     Hammertoe, bilateral     Onychomycosis     Anhidrosis     Type 2 diabetes mellitus with other circulatory complications (H)     History of colon polyps        Family History       family history includes Colon Cancer in his sister; Heart Failure in his mother; Leukemia in his father and mother; Snoring in his father and mother.    Social History      reports that he has quit smoking. He has been exposed to tobacco smoke. He has never used smokeless tobacco. He reports current alcohol use. He reports that he does not use drugs.      Review of Systems     Patient has no polyuria or polydipsia, no chest pain, dyspnea or TIA's, no numbness, tingling or pain in extremities  Remainder negative except as noted in HPI.    Vital Signs     /83 (BP Location: Left arm, Patient Position: Sitting, Cuff Size: Adult Large)   Pulse 83   Wt (!) 169.2 kg (373 lb)   SpO2 92%   BMI 53.52 kg/m    Wt Readings from Last 3 Encounters:   12/19/24 (!) 169.2 kg (373 lb)   12/02/24 (!) 163.3 kg (360 lb)   11/13/24 (!) 166 kg (366 lb)       Physical Exam     Constitutional:  Well developed, Well nourished, obese  HENT:  Normocephalic,   Neck: Thyroid normal, No lymph nodes, Supple  Eyes:  PERRL, Conjunctiva pink  Respiratory:  mild respiratory distress, auditory wheezing while ambulating  Cardiovascular:  Normal heart rate, Normal rhythm, No murmurs  Musculoskeletal:  No gross deformity or  lesions,   Skin: No acanthosis nigricans, lipoatrophy or lipodystrophy  Neurologic:  Alert & oriented x 3, nonfocal  Psychiatric:  Affect, Mood, Insight appropriate  Diabetic foot exam: no ulcers, charcot's or high risk calluses,    Assessment     1. Type 2 diabetes mellitus with other circulatory complications (H)    2. MAHMOOD (dyspnea on exertion)        Plan     Discussed changing from manual to using control IQ. He is going to think about it. Follow-up in 3 months.         Emily Alfred NP  [facility]  12/19/2024  8:25 AM      Lab Results     Microalbumin Urine mg/dL   Date Value Ref Range Status   10/02/2020 17.87 (H) 0.00 - 1.99 mg/dL Final       Cholesterol   Date Value Ref Range Status   09/16/2024 100 <200 mg/dL Final     Direct Measure HDL   Date Value Ref Range Status   09/16/2024 35 (L) >=40 mg/dL Final     Triglycerides   Date Value Ref Range Status   09/16/2024 154 (H) <150 mg/dL Final       [unfilled]      Current Medications     Outpatient Medications Prior to Visit   Medication Sig Dispense Refill     albuterol (PROAIR HFA/PROVENTIL HFA/VENTOLIN HFA) 108 (90 Base) MCG/ACT inhaler INHALE 1 PUFF INTO THE LUNGS EVERY 4 HOURS AS NEEDED       aspirin 81 MG EC tablet [ASPIRIN 81 MG EC TABLET] Take 81 mg by mouth daily.       atorvastatin (LIPITOR) 80 MG tablet Take 1 tablet (80 mg) by mouth daily 90 tablet 3     bumetanide (BUMEX) 2 MG tablet TAKE 1 TABLET BY MOUTH DAILY 100 tablet 3     Cholecalciferol (VITAMIN D-3) 25 MCG (1000 UT) CAPS 1 capsule       Continuous Glucose Sensor (FREESTYLE ANA M 3 SENSOR) MISC USE 1 SENSOR EVERY 14 DAYS 6 each 0     Digital Therapy (HELLO HEART BLOOD PRESSURE) KIT        fluticasone-salmeterol (ADVAIR DISKUS) 250-50 MCG/ACT inhaler Inhale 1 puff into the lungs 2 times daily. 60 each 11     Insulin Infusion Pump Supplies (AUTOSOFT 90 INFUSION SET) MISC 1 each every 3 days 30 each 3     Insulin Infusion Pump Supplies (T:SLIM X2 3ML CARTRIDGE) MISC 1 each every 3 days 30  each 3     insulin lispro (HUMALOG) 100 UNIT/ML vial INJECT SUBCUTANEOUSLY 400 UNITS  DAILY VIA INSULIN PUMP 120 mL 2     lisinopril (ZESTRIL) 20 MG tablet Take 1 tablet (20 mg) by mouth 2 times daily 180 tablet 3     metoprolol tartrate (LOPRESSOR) 100 MG tablet Take 1 tablet (100 mg) by mouth 2 times daily 180 tablet 3     multivitamin (ONE A DAY) per tablet [MULTIVITAMIN (ONE A DAY) PER TABLET] Take 1 tablet by mouth daily.       omeprazole (PRILOSEC) 20 MG capsule [OMEPRAZOLE (PRILOSEC) 20 MG CAPSULE] Take 20 mg by mouth daily.       TRULICITY 3 MG/0.5ML SOPN INJECT THE CONTENTS OF ONE PEN  SUBCUTANEOUSLY WEEKLY AS  DIRECTED 6 mL 1     triamcinolone (KENALOG) 0.025 % cream [TRIAMCINOLONE (KENALOG) 0.025 % CREAM] Apply 1 application topically 3 (three) times a day as needed.        No facility-administered medications prior to visit.                                                                       Again, thank you for allowing me to participate in the care of your patient.        Sincerely,        Emily Alfred NP

## 2025-01-07 DIAGNOSIS — E11.59 TYPE 2 DIABETES MELLITUS WITH OTHER CIRCULATORY COMPLICATION, WITH LONG-TERM CURRENT USE OF INSULIN (H): ICD-10-CM

## 2025-01-07 DIAGNOSIS — Z79.4 TYPE 2 DIABETES MELLITUS WITH OTHER CIRCULATORY COMPLICATION, WITH LONG-TERM CURRENT USE OF INSULIN (H): ICD-10-CM

## 2025-01-08 RX ORDER — DULAGLUTIDE 3 MG/.5ML
INJECTION, SOLUTION SUBCUTANEOUS
Qty: 6 ML | Refills: 3 | OUTPATIENT
Start: 2025-01-08

## 2025-01-08 NOTE — TELEPHONE ENCOUNTER
Requested Prescriptions   Pending Prescriptions Disp Refills    TRULICITY 3 MG/0.5ML SOAJ [Pharmacy Med Name: Trulicity 3 MG/0.5ML Subcutaneous Solution Pen-injector] 6 mL 3     Sig: INJECT THE CONTENTS OF ONE PEN  SUBCUTANEOUSLY WEEKLY AS  DIRECTED       GLP-1 Agonists Protocol Passed - 1/8/2025 12:22 PM        Passed - HgbA1C in past 3 or 6 months     If HgbA1C is 8 or greater, it needs to be on file within the past 3 months.  If less than 8, must be on file within the past 6 months.     Recent Labs   Lab Test 12/03/24  0804   A1C 6.6*             Passed - Medication is active on med list        Passed - Has GFR on file in past 12 months and most recent value is normal        Passed - Recent (6 mo) or future (90 days) visit within the authorizing provider's specialty     The patient must have completed an in-person or virtual visit within the past 6 months or has a future visit scheduled within the next 90 days with the authorizing provider s specialty.  Urgent care and e-visits do not quality as an office visit for this protocol.          Passed - Medication indicated for associated diagnosis     Medication is associated with one or more of the following diagnoses:     Type 2 diabetes mellitus           Passed - Patient is age 18 or older

## 2025-01-13 DIAGNOSIS — E11.59 TYPE 2 DIABETES MELLITUS WITH OTHER CIRCULATORY COMPLICATION, WITH LONG-TERM CURRENT USE OF INSULIN (H): ICD-10-CM

## 2025-01-13 DIAGNOSIS — Z79.4 TYPE 2 DIABETES MELLITUS WITH OTHER CIRCULATORY COMPLICATION, WITH LONG-TERM CURRENT USE OF INSULIN (H): ICD-10-CM

## 2025-01-14 RX ORDER — ACYCLOVIR 800 MG/1
TABLET ORAL
Refills: 3 | OUTPATIENT
Start: 2025-01-14

## 2025-01-14 NOTE — TELEPHONE ENCOUNTER
Requested Prescriptions   Pending Prescriptions Disp Refills    Continuous Glucose Sensor (FREESTYLE ANA M 3 SENSOR) MISC [Pharmacy Med Name: FREESTYLE LIBRE_3 SENSOR]  3     Sig: USE 1 SENSOR EVERY 14 DAYS       There is no refill protocol information for this order

## 2025-02-10 DIAGNOSIS — E11.59 TYPE 2 DIABETES MELLITUS WITH OTHER CIRCULATORY COMPLICATION, WITH LONG-TERM CURRENT USE OF INSULIN (H): ICD-10-CM

## 2025-02-10 DIAGNOSIS — Z79.4 TYPE 2 DIABETES MELLITUS WITH OTHER CIRCULATORY COMPLICATION, WITH LONG-TERM CURRENT USE OF INSULIN (H): ICD-10-CM

## 2025-02-11 RX ORDER — DULAGLUTIDE 3 MG/.5ML
INJECTION, SOLUTION SUBCUTANEOUS
Qty: 6 ML | Refills: 1 | Status: SHIPPED | OUTPATIENT
Start: 2025-02-11

## 2025-02-11 RX ORDER — ACYCLOVIR 800 MG/1
TABLET ORAL
Qty: 6 EACH | Refills: 1 | Status: SHIPPED | OUTPATIENT
Start: 2025-02-11

## 2025-02-11 NOTE — TELEPHONE ENCOUNTER
Requested Prescriptions   Pending Prescriptions Disp Refills    Continuous Glucose Sensor (FREESTYLE ANA M 3 SENSOR) MISC [Pharmacy Med Name: FREESTYLE LIBRE_3 SENSOR]  3     Sig: USE 1 SENSOR EVERY 14 DAYS       There is no refill protocol information for this order       TRULICITY 3 MG/0.5ML SOAJ [Pharmacy Med Name: Trulicity 3 MG/0.5ML Subcutaneous Solution Pen-injector] 6 mL 3     Sig: INJECT THE CONTENTS OF ONE PEN  SUBCUTANEOUSLY WEEKLY AS  DIRECTED       GLP-1 Agonists Protocol Passed - 2/11/2025  9:04 AM        Passed - HgbA1C in past 3 or 6 months     If HgbA1C is 8 or greater, it needs to be on file within the past 3 months.  If less than 8, must be on file within the past 6 months.     Recent Labs   Lab Test 12/03/24  0804   A1C 6.6*             Passed - Medication is active on med list        Passed - Has GFR on file in past 12 months and most recent value is normal        Passed - Recent (6 mo) or future (90 days) visit within the authorizing provider's specialty     The patient must have completed an in-person or virtual visit within the past 6 months or has a future visit scheduled within the next 90 days with the authorizing provider s specialty.  Urgent care and e-visits do not quality as an office visit for this protocol.          Passed - Medication indicated for associated diagnosis     Medication is associated with one or more of the following diagnoses:     Type 2 diabetes mellitus           Passed - Patient is age 18 or older

## 2025-03-10 ENCOUNTER — LAB (OUTPATIENT)
Dept: LAB | Facility: CLINIC | Age: 75
End: 2025-03-10
Payer: COMMERCIAL

## 2025-03-10 DIAGNOSIS — E11.59 TYPE 2 DIABETES MELLITUS WITH OTHER CIRCULATORY COMPLICATIONS (H): ICD-10-CM

## 2025-03-10 LAB
ANION GAP SERPL CALCULATED.3IONS-SCNC: 12 MMOL/L (ref 7–15)
BUN SERPL-MCNC: 20 MG/DL (ref 8–23)
CALCIUM SERPL-MCNC: 8.7 MG/DL (ref 8.8–10.4)
CHLORIDE SERPL-SCNC: 104 MMOL/L (ref 98–107)
CREAT SERPL-MCNC: 1.06 MG/DL (ref 0.67–1.17)
EGFRCR SERPLBLD CKD-EPI 2021: 75 ML/MIN/1.73M2
EST. AVERAGE GLUCOSE BLD GHB EST-MCNC: 146 MG/DL
GLUCOSE SERPL-MCNC: 116 MG/DL (ref 70–99)
HBA1C MFR BLD: 6.7 % (ref 0–5.6)
HCO3 SERPL-SCNC: 24 MMOL/L (ref 22–29)
POTASSIUM SERPL-SCNC: 3.7 MMOL/L (ref 3.4–5.3)
SODIUM SERPL-SCNC: 140 MMOL/L (ref 135–145)

## 2025-03-10 PROCEDURE — 83036 HEMOGLOBIN GLYCOSYLATED A1C: CPT

## 2025-03-10 PROCEDURE — 36415 COLL VENOUS BLD VENIPUNCTURE: CPT

## 2025-03-10 PROCEDURE — 80048 BASIC METABOLIC PNL TOTAL CA: CPT

## 2025-03-15 ENCOUNTER — HEALTH MAINTENANCE LETTER (OUTPATIENT)
Age: 75
End: 2025-03-15

## 2025-03-21 ENCOUNTER — OFFICE VISIT (OUTPATIENT)
Dept: ENDOCRINOLOGY | Facility: CLINIC | Age: 75
End: 2025-03-21
Payer: COMMERCIAL

## 2025-03-21 VITALS
OXYGEN SATURATION: 94 % | WEIGHT: 315 LBS | HEART RATE: 72 BPM | BODY MASS INDEX: 53.69 KG/M2 | DIASTOLIC BLOOD PRESSURE: 69 MMHG | SYSTOLIC BLOOD PRESSURE: 151 MMHG

## 2025-03-21 DIAGNOSIS — E11.59 TYPE 2 DIABETES MELLITUS WITH OTHER CIRCULATORY COMPLICATIONS (H): Primary | ICD-10-CM

## 2025-03-21 PROCEDURE — 99214 OFFICE O/P EST MOD 30 MIN: CPT | Performed by: NURSE PRACTITIONER

## 2025-03-21 PROCEDURE — G2211 COMPLEX E/M VISIT ADD ON: HCPCS | Performed by: NURSE PRACTITIONER

## 2025-03-21 PROCEDURE — 3078F DIAST BP <80 MM HG: CPT | Performed by: NURSE PRACTITIONER

## 2025-03-21 PROCEDURE — 3077F SYST BP >= 140 MM HG: CPT | Performed by: NURSE PRACTITIONER

## 2025-03-21 PROCEDURE — 95251 CONT GLUC MNTR ANALYSIS I&R: CPT | Performed by: NURSE PRACTITIONER

## 2025-03-21 NOTE — PROGRESS NOTES
[facility] ENDOCRINOLOGY    Diabetes Note 3/21/2025    Walt Lambert, 1950, [id number]          Reason for visit      1. Type 2 diabetes mellitus with other circulatory complications (H)        HPI     Walt Lambert is a very pleasant 74 year old old male who presents for follow up.  SUMMARY:    Alvino is seen today in follow-up for DM 2. His current A1c is 6.7 and was last 6.6. He continues to use the Tandem Insulin pump and the Ivelisse CGM, which was downloaded and data was reviewed.     TIR was 80% and above, 20%, He had no hypoglycemia recorded. GMI of 6.9 for the last two weeks.     As he has not connected his Pump and his CGM, partly because he doesn't want to do so. He manually inputs information. He is using almost 300 units daily, and will refill the reservoir.    He has had a recent Dx of Plaque Psoriasis, and it is evident on his hands today. He reports that he is doing Red Light Therapy at home.     Insulin Pump Settings     Basal Rate  TIME Units/HR   0000 - 0000 6.0                         Bolus: Insulin : CHO ratio  Time Units Grams CHO   0000 - 0000 1 8                          Correction  #Units Blood glucose >Target BG   1 ? ?             He does have mild swelling in his ankles and DM Dermopathy in bilateral shins.     Renal function is within normal range.       Past Medical History     Patient Active Problem List   Diagnosis    Diabetes mellitus (H)    Morbid obesity (H)    Cardiomyopathy (H)    Chronic obstructive pulmonary disease (H)    Hyperlipidemia    Hypertension    Sleep apnea    Insulin pump status    MAHMOOD (dyspnea on exertion)    Abnormal cardiac CT angiography    CAD (coronary artery disease)    S/P CABG (coronary artery bypass graft)    ARF (acute respiratory failure) (H)    Anemia due to blood loss, acute    Arteriosclerosis of coronary artery bypass graft    Body mass index (BMI) 50.0-59.9, adult    Pure hypertriglyceridemia    Peripheral venous insufficiency     Lymphedema    Chronic kidney disease, stage 1    Hammertoe, bilateral    Onychomycosis    Anhidrosis    Type 2 diabetes mellitus with other circulatory complications (H)    History of colon polyps    Plaque psoriasis        Family History       family history includes Colon Cancer in his sister; Heart Failure in his mother; Leukemia in his father and mother; Snoring in his father and mother.    Social History      reports that he has quit smoking. He has been exposed to tobacco smoke. He has never used smokeless tobacco. He reports current alcohol use. He reports that he does not use drugs.      Review of Systems     Patient has no polyuria or polydipsia, no chest pain, dyspnea or TIA's, no numbness, tingling or pain in extremities  Remainder negative except as noted in HPI.      Vital Signs     BP (!) 151/69 (BP Location: Left arm, Patient Position: Sitting, Cuff Size: Adult Large)   Pulse 72   Wt (!) 169.7 kg (374 lb 3.2 oz)   SpO2 94%   BMI 53.69 kg/m    Wt Readings from Last 3 Encounters:   03/21/25 (!) 169.7 kg (374 lb 3.2 oz)   12/19/24 (!) 169.2 kg (373 lb)   12/02/24 (!) 163.3 kg (360 lb)       Physical Exam     Constitutional:  Well developed, Well nourished, obese  HENT:  Normocephalic,   Eyes:  PERRL, Conjunctiva pink  Respiratory:  Normal breath sounds, No respiratory distress  Cardiovascular:  Normal heart rate, Normal rhythm, No murmurs  GI:  Bowel sounds normal, Soft, No tenderness  Musculoskeletal:  No gross deformity or lesions, normal dorsalis pedis pulses  Skin: No acanthosis nigricans, lipoatrophy or lipodystrophy  Neurologic:  Alert & oriented x 3, nonfocal  Psychiatric:  Affect, Mood, Insight appropriate        Assessment     1. Type 2 diabetes mellitus with other circulatory complications (H)        Plan     No changes warranted today. Follow-up with me in 3 months per Medicare guidelines.           Emily Alfred NP  [facility]  3/21/2025  8:27 AM      Lab Results     Microalbumin  Urine mg/dL   Date Value Ref Range Status   10/02/2020 17.87 (H) 0.00 - 1.99 mg/dL Final       Cholesterol   Date Value Ref Range Status   09/16/2024 100 <200 mg/dL Final     Direct Measure HDL   Date Value Ref Range Status   09/16/2024 35 (L) >=40 mg/dL Final     Triglycerides   Date Value Ref Range Status   09/16/2024 154 (H) <150 mg/dL Final       [unfilled]      Current Medications     Outpatient Medications Prior to Visit   Medication Sig Dispense Refill    albuterol (PROAIR HFA/PROVENTIL HFA/VENTOLIN HFA) 108 (90 Base) MCG/ACT inhaler INHALE 1 PUFF INTO THE LUNGS EVERY 4 HOURS AS NEEDED      aspirin 81 MG EC tablet [ASPIRIN 81 MG EC TABLET] Take 81 mg by mouth daily.      atorvastatin (LIPITOR) 80 MG tablet Take 1 tablet (80 mg) by mouth daily 90 tablet 3    bumetanide (BUMEX) 2 MG tablet TAKE 1 TABLET BY MOUTH DAILY 100 tablet 3    Cholecalciferol (VITAMIN D-3) 25 MCG (1000 UT) CAPS 1 capsule      Continuous Glucose Sensor (FREESTYLE ANA M 3 SENSOR) MISC USE 1 SENSOR EVERY 14 DAYS 6 each 1    Digital Therapy (HELLO HEART BLOOD PRESSURE) KIT       fluticasone-salmeterol (ADVAIR DISKUS) 250-50 MCG/ACT inhaler Inhale 1 puff into the lungs 2 times daily. 60 each 11    Insulin Infusion Pump Supplies (AUTOSOFT 90 INFUSION SET) MISC 1 each every 3 days 30 each 3    Insulin Infusion Pump Supplies (T:SLIM X2 3ML CARTRIDGE) MISC 1 each every 3 days 30 each 3    insulin lispro (HUMALOG) 100 UNIT/ML vial INJECT SUBCUTANEOUSLY 400 UNITS  DAILY VIA INSULIN PUMP 120 mL 2    lisinopril (ZESTRIL) 20 MG tablet Take 1 tablet (20 mg) by mouth 2 times daily 180 tablet 3    metoprolol tartrate (LOPRESSOR) 100 MG tablet Take 1 tablet (100 mg) by mouth 2 times daily 180 tablet 3    multivitamin (ONE A DAY) per tablet [MULTIVITAMIN (ONE A DAY) PER TABLET] Take 1 tablet by mouth daily.      omeprazole (PRILOSEC) 20 MG capsule [OMEPRAZOLE (PRILOSEC) 20 MG CAPSULE] Take 20 mg by mouth daily.      TRULICITY 3 MG/0.5ML SOAJ INJECT THE  CONTENTS OF ONE PEN  SUBCUTANEOUSLY WEEKLY AS  DIRECTED 6 mL 1     No facility-administered medications prior to visit.

## 2025-03-21 NOTE — LETTER
3/21/2025      Walt Lambert  [address] [place] [id number]      Dear Colleague,    Thank you for referring your patient, Walt Lambert, to the [facility] [place]. Please see a copy of my visit note below.    [facility] ENDOCRINOLOGY    Diabetes Note 3/21/2025    Walt Lambert, 1950, [id number]          Reason for visit      1. Type 2 diabetes mellitus with other circulatory complications (H)        HPI     Walt Lambert is a very pleasant 74 year old old male who presents for follow up.  SUMMARY:    Alvino is seen today in follow-up for DM 2. His current A1c is 6.7 and was last 6.6. He continues to use the Tandem Insulin pump and the Ivelisse CGM, which was downloaded and data was reviewed.     TIR was 80% and above, 20%, He had no hypoglycemia recorded. GMI of 6.9 for the last two weeks.     As he has not connected his Pump and his CGM, partly because he doesn't want to do so. He manually inputs information. He is using almost 300 units daily, and will refill the reservoir.    He has had a recent Dx of Plaque Psoriasis, and it is evident on his hands today. He reports that he is doing Red Light Therapy at home.     Insulin Pump Settings     Basal Rate  TIME Units/HR   0000 - 0000 6.0                         Bolus: Insulin : CHO ratio  Time Units Grams CHO   0000 - 0000 1 8                          Correction  #Units Blood glucose >Target BG   1 ? ?             He does have mild swelling in his ankles and DM Dermopathy in bilateral shins.     Renal function is within normal range.       Past Medical History     Patient Active Problem List   Diagnosis     Diabetes mellitus (H)     Morbid obesity (H)     Cardiomyopathy (H)     Chronic obstructive pulmonary disease (H)     Hyperlipidemia     Hypertension     Sleep apnea     Insulin pump status     MAHMOOD (dyspnea on exertion)     Abnormal cardiac CT angiography     CAD (coronary artery disease)     S/P CABG  (coronary artery bypass graft)     ARF (acute respiratory failure) (H)     Anemia due to blood loss, acute     Arteriosclerosis of coronary artery bypass graft     Body mass index (BMI) 50.0-59.9, adult     Pure hypertriglyceridemia     Peripheral venous insufficiency     Lymphedema     Chronic kidney disease, stage 1     Hammertoe, bilateral     Onychomycosis     Anhidrosis     Type 2 diabetes mellitus with other circulatory complications (H)     History of colon polyps     Plaque psoriasis        Family History       family history includes Colon Cancer in his sister; Heart Failure in his mother; Leukemia in his father and mother; Snoring in his father and mother.    Social History      reports that he has quit smoking. He has been exposed to tobacco smoke. He has never used smokeless tobacco. He reports current alcohol use. He reports that he does not use drugs.      Review of Systems     Patient has no polyuria or polydipsia, no chest pain, dyspnea or TIA's, no numbness, tingling or pain in extremities  Remainder negative except as noted in HPI.      Vital Signs     BP (!) 151/69 (BP Location: Left arm, Patient Position: Sitting, Cuff Size: Adult Large)   Pulse 72   Wt (!) 169.7 kg (374 lb 3.2 oz)   SpO2 94%   BMI 53.69 kg/m    Wt Readings from Last 3 Encounters:   03/21/25 (!) 169.7 kg (374 lb 3.2 oz)   12/19/24 (!) 169.2 kg (373 lb)   12/02/24 (!) 163.3 kg (360 lb)       Physical Exam     Constitutional:  Well developed, Well nourished, obese  HENT:  Normocephalic,   Eyes:  PERRL, Conjunctiva pink  Respiratory:  Normal breath sounds, No respiratory distress  Cardiovascular:  Normal heart rate, Normal rhythm, No murmurs  GI:  Bowel sounds normal, Soft, No tenderness  Musculoskeletal:  No gross deformity or lesions, normal dorsalis pedis pulses  Skin: No acanthosis nigricans, lipoatrophy or lipodystrophy  Neurologic:  Alert & oriented x 3, nonfocal  Psychiatric:  Affect, Mood, Insight  appropriate        Assessment     1. Type 2 diabetes mellitus with other circulatory complications (H)        Plan     No changes warranted today. Follow-up with me in 3 months per Medicare guidelines.           Emily Alfred NP  [facility]  3/21/2025  8:27 AM      Lab Results     Microalbumin Urine mg/dL   Date Value Ref Range Status   10/02/2020 17.87 (H) 0.00 - 1.99 mg/dL Final       Cholesterol   Date Value Ref Range Status   09/16/2024 100 <200 mg/dL Final     Direct Measure HDL   Date Value Ref Range Status   09/16/2024 35 (L) >=40 mg/dL Final     Triglycerides   Date Value Ref Range Status   09/16/2024 154 (H) <150 mg/dL Final       [unfilled]      Current Medications     Outpatient Medications Prior to Visit   Medication Sig Dispense Refill     albuterol (PROAIR HFA/PROVENTIL HFA/VENTOLIN HFA) 108 (90 Base) MCG/ACT inhaler INHALE 1 PUFF INTO THE LUNGS EVERY 4 HOURS AS NEEDED       aspirin 81 MG EC tablet [ASPIRIN 81 MG EC TABLET] Take 81 mg by mouth daily.       atorvastatin (LIPITOR) 80 MG tablet Take 1 tablet (80 mg) by mouth daily 90 tablet 3     bumetanide (BUMEX) 2 MG tablet TAKE 1 TABLET BY MOUTH DAILY 100 tablet 3     Cholecalciferol (VITAMIN D-3) 25 MCG (1000 UT) CAPS 1 capsule       Continuous Glucose Sensor (FREESTYLE ANA M 3 SENSOR) MISC USE 1 SENSOR EVERY 14 DAYS 6 each 1     Digital Therapy (HELLO HEART BLOOD PRESSURE) KIT        fluticasone-salmeterol (ADVAIR DISKUS) 250-50 MCG/ACT inhaler Inhale 1 puff into the lungs 2 times daily. 60 each 11     Insulin Infusion Pump Supplies (AUTOSOFT 90 INFUSION SET) MISC 1 each every 3 days 30 each 3     Insulin Infusion Pump Supplies (T:SLIM X2 3ML CARTRIDGE) MISC 1 each every 3 days 30 each 3     insulin lispro (HUMALOG) 100 UNIT/ML vial INJECT SUBCUTANEOUSLY 400 UNITS  DAILY VIA INSULIN PUMP 120 mL 2     lisinopril (ZESTRIL) 20 MG tablet Take 1 tablet (20 mg) by mouth 2 times daily 180 tablet 3     metoprolol tartrate (LOPRESSOR) 100 MG tablet  Take 1 tablet (100 mg) by mouth 2 times daily 180 tablet 3     multivitamin (ONE A DAY) per tablet [MULTIVITAMIN (ONE A DAY) PER TABLET] Take 1 tablet by mouth daily.       omeprazole (PRILOSEC) 20 MG capsule [OMEPRAZOLE (PRILOSEC) 20 MG CAPSULE] Take 20 mg by mouth daily.       TRULICITY 3 MG/0.5ML SOAJ INJECT THE CONTENTS OF ONE PEN  SUBCUTANEOUSLY WEEKLY AS  DIRECTED 6 mL 1     No facility-administered medications prior to visit.                       Again, thank you for allowing me to participate in the care of your patient.        Sincerely,        Emliy Alfred NP    Electronically signed

## 2025-03-23 PROBLEM — L40.0 PLAQUE PSORIASIS: Status: ACTIVE | Noted: 2025-03-18

## 2025-04-11 ENCOUNTER — MYC MEDICAL ADVICE (OUTPATIENT)
Dept: CARDIOLOGY | Facility: CLINIC | Age: 75
End: 2025-04-11
Payer: COMMERCIAL

## 2025-04-18 ENCOUNTER — MYC REFILL (OUTPATIENT)
Dept: ENDOCRINOLOGY | Facility: CLINIC | Age: 75
End: 2025-04-18
Payer: COMMERCIAL

## 2025-04-18 DIAGNOSIS — Z79.4 TYPE 2 DIABETES MELLITUS WITH OTHER CIRCULATORY COMPLICATION, WITH LONG-TERM CURRENT USE OF INSULIN (H): ICD-10-CM

## 2025-04-18 DIAGNOSIS — E11.59 TYPE 2 DIABETES MELLITUS WITH OTHER CIRCULATORY COMPLICATION, WITH LONG-TERM CURRENT USE OF INSULIN (H): ICD-10-CM

## 2025-04-21 RX ORDER — DULAGLUTIDE 3 MG/.5ML
INJECTION, SOLUTION SUBCUTANEOUS
Qty: 6 ML | Refills: 1 | OUTPATIENT
Start: 2025-04-21

## 2025-04-21 RX ORDER — ACYCLOVIR 800 MG/1
TABLET ORAL
Qty: 6 EACH | Refills: 1 | OUTPATIENT
Start: 2025-04-21

## 2025-04-24 NOTE — TELEPHONE ENCOUNTER
Called patient to review recent elevated blood pressure reading.  Per [place] Community [place] guidelines, patients blood pressure is out of parameters and recheck blood pressure is recommended.    Last Blood Pressure: 151/69  Last Heart Rate: 72  Date: 3/21/25  Location: Other Specialty    Today's Blood Pressure: 139/75  Today's Heart Rate: 72  Location: Home BP    Patient reported blood pressure updated in Epic. Blood pressure falls within [place] Community [place] guidelines.  Patient will follow up as previously advised.

## 2025-06-09 DIAGNOSIS — Z79.4 TYPE 2 DIABETES MELLITUS WITH OTHER CIRCULATORY COMPLICATION, WITH LONG-TERM CURRENT USE OF INSULIN (H): ICD-10-CM

## 2025-06-09 DIAGNOSIS — E11.59 TYPE 2 DIABETES MELLITUS WITH OTHER CIRCULATORY COMPLICATION, WITH LONG-TERM CURRENT USE OF INSULIN (H): ICD-10-CM

## 2025-06-10 NOTE — TELEPHONE ENCOUNTER
Pt has follow up 6/27- 2 month supply filled 5/17.    Requested Prescriptions   Pending Prescriptions Disp Refills    Continuous Glucose Sensor (FREESTYLE ANA M 3 SENSOR) MISC [Pharmacy Med Name: FREESTYLE LIBRE_3 SENSOR]  5     Sig: USE 1 SENSOR EVERY 14 DAYS       There is no refill protocol information for this order

## 2025-06-20 ENCOUNTER — RESULTS FOLLOW-UP (OUTPATIENT)
Dept: ENDOCRINOLOGY | Facility: CLINIC | Age: 75
End: 2025-06-20

## 2025-06-20 DIAGNOSIS — Z79.4 TYPE 2 DIABETES MELLITUS WITH OTHER CIRCULATORY COMPLICATION, WITH LONG-TERM CURRENT USE OF INSULIN (H): ICD-10-CM

## 2025-06-20 DIAGNOSIS — E11.59 TYPE 2 DIABETES MELLITUS WITH OTHER CIRCULATORY COMPLICATION, WITH LONG-TERM CURRENT USE OF INSULIN (H): ICD-10-CM

## 2025-06-23 RX ORDER — DULAGLUTIDE 3 MG/.5ML
INJECTION, SOLUTION SUBCUTANEOUS
Qty: 6 ML | Refills: 3 | OUTPATIENT
Start: 2025-06-23

## 2025-06-23 NOTE — TELEPHONE ENCOUNTER
Requested Prescriptions   Pending Prescriptions Disp Refills    TRULICITY 3 MG/0.5ML SOAJ [Pharmacy Med Name: Trulicity 3 MG/0.5ML Subcutaneous Solution Pen-injector] 6 mL 3     Sig: INJECT THE CONTENTS OF ONE PEN  SUBCUTANEOUSLY WEEKLY AS  DIRECTED       GLP-1 Agonists Protocol Failed - 6/23/2025  9:49 AM        Failed - Has GFR on file in past 12 months and most recent value is normal        Passed - HgbA1C in past 3 or 6 months     If HgbA1C is 8 or greater, it needs to be on file within the past 3 months.  If less than 8, must be on file within the past 6 months.     Recent Labs   Lab Test 06/20/25  0755   A1C 6.6*             Passed - Medication is active on med list and the sig matches. RN to manually verify dose and sig if red X/fail.     If the protocol passes (green check), you do not need to verify med dose and sig.    A prescription matches if they are the same clinical intention.    For Example: once daily and every morning are the same.    The protocol can not identify upper and lower case letters as matching and will fail.     For Example: Take 1 tablet (50 mg) by mouth daily     TAKE 1 TABLET (50 MG) BY MOUTH DAILY    For all fails (red x), verify dose and sig.    If the refill does match what is on file, the RN can still proceed to approve the refill request.       If they do not match, route to the appropriate provider.             Passed - Recent (6 month) or future (90 days) visit with the authorizing provider's specialty (provided they have been seen in the past 9 months)     The patient must have completed an in-person or virtual visit within the past 6 months or has a future visit scheduled within the next 90 days with the authorizing provider s specialty.  Urgent care and e-visits do not quality as an office visit for this protocol.          Passed - Medication indicated for associated diagnosis     Medication is associated with one or more of the following diagnoses:     Type 2 diabetes  mellitus           Passed - Patient is age 18 or older

## 2025-06-27 PROBLEM — I25.5 ISCHEMIC CARDIOMYOPATHY: Status: ACTIVE | Noted: 2025-06-27

## 2025-06-27 PROBLEM — E11.42 POLYNEUROPATHY DUE TO TYPE 2 DIABETES MELLITUS (H): Status: ACTIVE | Noted: 2025-06-27

## 2025-06-27 PROBLEM — E11.21 DIABETIC NEPHROPATHY (H): Status: ACTIVE | Noted: 2025-06-27

## 2025-06-27 PROBLEM — F17.211 NICOTINE DEPENDENCE, CIGARETTES, IN REMISSION: Status: ACTIVE | Noted: 2025-06-27

## 2025-06-30 RX ORDER — ACYCLOVIR 800 MG/1
TABLET ORAL
Refills: 5 | OUTPATIENT
Start: 2025-06-30

## 2025-07-06 ENCOUNTER — MYC REFILL (OUTPATIENT)
Dept: ENDOCRINOLOGY | Facility: CLINIC | Age: 75
End: 2025-07-06
Payer: COMMERCIAL

## 2025-07-06 DIAGNOSIS — Z79.4 TYPE 2 DIABETES MELLITUS WITH OTHER CIRCULATORY COMPLICATION, WITH LONG-TERM CURRENT USE OF INSULIN (H): ICD-10-CM

## 2025-07-06 DIAGNOSIS — E11.59 TYPE 2 DIABETES MELLITUS WITH OTHER CIRCULATORY COMPLICATION, WITH LONG-TERM CURRENT USE OF INSULIN (H): ICD-10-CM

## 2025-07-07 RX ORDER — INSULIN LISPRO 100 [IU]/ML
INJECTION, SOLUTION INTRAVENOUS; SUBCUTANEOUS
Qty: 120 ML | Refills: 1 | Status: SHIPPED | OUTPATIENT
Start: 2025-07-07

## 2025-07-07 NOTE — TELEPHONE ENCOUNTER
Requested Prescriptions   Pending Prescriptions Disp Refills    insulin lispro (HUMALOG) 100 UNIT/ML vial 120 mL 2     Sig: INJECT SUBCUTANEOUSLY 400 UNITS  DAILY VIA INSULIN PUMP       Insulin Protocol Failed - 7/7/2025  1:51 PM        Failed - Chart review required     Review Chart.    Do not approve if insulin is used in a pump.  Instead, direct refill request to the patient's endocrinologist.  If the patient doesn't have an endocrinologist, then send the refill to the patient's PCP for review            Passed - HgbA1C in past 3 or 6 months     If HgbA1C is 8 or greater, it needs to be on file within the past 3 months.  If less than 8, must be on file within the past 6 months.     Recent Labs   Lab Test 06/20/25  0755   A1C 6.6*             Passed - Medication is active on med list and the sig matches. RN to manually verify dose and sig if red X/fail.     If the protocol passes (green check), you do not need to verify med dose and sig.    A prescription matches if they are the same clinical intention.    For Example: once daily and every morning are the same.    The protocol can not identify upper and lower case letters as matching and will fail.     For Example: Take 1 tablet (50 mg) by mouth daily     TAKE 1 TABLET (50 MG) BY MOUTH DAILY    For all fails (red x), verify dose and sig.    If the refill does match what is on file, the RN can still proceed to approve the refill request.       If they do not match, route to the appropriate provider.             Passed - Recent (6 month) or future (90 days) visit with the authorizing provider's specialty (provided they have been seen in the past 9 months)     The patient must have completed an in-person or virtual visit within the past 6 months or has a future visit scheduled within the next 90 days with the authorizing provider s specialty.  Urgent care and e-visits do not quality as an office visit for this protocol.          Passed - Medication indicated for  associated diagnosis     Medication is associated with one or more of the following diagnoses:   - Type 1 diabetes mellitus  - Type 2 diabetes mellitus  - Diabetic nephropathy; Prophylaxis  - Neuropathy due to diabetes mellitus; Prophylaxis  - Retinopathy due to diabetes mellitus; Prophylaxis  - Diabetes mellitus associated with cystic fibrosis  - Disorder of cardiovascular system; Prophylaxis - Type 1 diabetes mellitus   - Disorder of cardiovascular system; Prophylaxis - Type 2 diabetes mellitus            Passed - Patient is 18 years of age or older

## 2025-08-04 DIAGNOSIS — Z79.4 TYPE 2 DIABETES MELLITUS WITH OTHER CIRCULATORY COMPLICATION, WITH LONG-TERM CURRENT USE OF INSULIN (H): ICD-10-CM

## 2025-08-04 DIAGNOSIS — E11.59 TYPE 2 DIABETES MELLITUS WITH OTHER CIRCULATORY COMPLICATION, WITH LONG-TERM CURRENT USE OF INSULIN (H): ICD-10-CM

## 2025-08-05 RX ORDER — INSULIN LISPRO 100 [IU]/ML
INJECTION, SOLUTION INTRAVENOUS; SUBCUTANEOUS
Qty: 240 ML | Refills: 5 | OUTPATIENT
Start: 2025-08-05

## 2025-08-31 DIAGNOSIS — E11.59 TYPE 2 DIABETES MELLITUS WITH OTHER CIRCULATORY COMPLICATIONS (H): ICD-10-CM

## 2025-09-03 RX ORDER — BLOOD-GLUCOSE SENSOR
EACH MISCELLANEOUS
Refills: 2 | OUTPATIENT
Start: 2025-09-03

## (undated) DEVICE — ENDO SNARE EXACTO COLD 9MM LOOP 2.4MMX230CM 00711115

## (undated) DEVICE — SUCTION MANIFOLD NEPTUNE 2 SYS 1 PORT 702-025-000

## (undated) DEVICE — SOL WATER IRRIG 1000ML BOTTLE 2F7114

## (undated) DEVICE — SNARE OVAL SMALL DISP 100600

## (undated) DEVICE — TUBING SUCTION MEDI-VAC 1/4"X20' N620A

## (undated) DEVICE — FORCEP BIOPSY 2.3MM DISP COATED 000388

## (undated) DEVICE — CLIP HEMOSTASIS ASSURANCE W16 MM BX00711884